# Patient Record
Sex: FEMALE | Race: WHITE | NOT HISPANIC OR LATINO | URBAN - METROPOLITAN AREA
[De-identification: names, ages, dates, MRNs, and addresses within clinical notes are randomized per-mention and may not be internally consistent; named-entity substitution may affect disease eponyms.]

---

## 2017-05-08 ENCOUNTER — EMERGENCY (EMERGENCY)
Facility: HOSPITAL | Age: 57
LOS: 1 days | End: 2017-05-08
Payer: MEDICAID

## 2017-05-08 PROCEDURE — 99284 EMERGENCY DEPT VISIT MOD MDM: CPT

## 2017-05-08 PROCEDURE — 71010: CPT | Mod: 26

## 2017-05-10 ENCOUNTER — OUTPATIENT (OUTPATIENT)
Dept: OUTPATIENT SERVICES | Facility: HOSPITAL | Age: 57
LOS: 1 days | End: 2017-05-10

## 2017-05-10 ENCOUNTER — INPATIENT (INPATIENT)
Facility: HOSPITAL | Age: 57
LOS: 1 days | Discharge: HOME | End: 2017-05-12
Payer: MEDICAID

## 2017-05-10 PROCEDURE — 99285 EMERGENCY DEPT VISIT HI MDM: CPT

## 2017-05-10 PROCEDURE — 71275 CT ANGIOGRAPHY CHEST: CPT | Mod: 26

## 2017-05-10 PROCEDURE — 71010: CPT | Mod: 26

## 2017-05-11 ENCOUNTER — OUTPATIENT (OUTPATIENT)
Dept: OUTPATIENT SERVICES | Facility: HOSPITAL | Age: 57
LOS: 1 days | End: 2017-05-11

## 2017-05-12 ENCOUNTER — OUTPATIENT (OUTPATIENT)
Dept: OUTPATIENT SERVICES | Facility: HOSPITAL | Age: 57
LOS: 1 days | End: 2017-05-12

## 2017-05-12 PROCEDURE — 93306 TTE W/DOPPLER COMPLETE: CPT | Mod: 26

## 2017-06-15 ENCOUNTER — INPATIENT (INPATIENT)
Facility: HOSPITAL | Age: 57
LOS: 3 days | Discharge: HOME | End: 2017-06-19
Attending: INTERNAL MEDICINE | Admitting: INTERNAL MEDICINE
Payer: MEDICAID

## 2017-06-15 VITALS
TEMPERATURE: 99 F | HEIGHT: 64 IN | OXYGEN SATURATION: 97 % | WEIGHT: 199.08 LBS | RESPIRATION RATE: 16 BRPM | SYSTOLIC BLOOD PRESSURE: 116 MMHG | DIASTOLIC BLOOD PRESSURE: 65 MMHG | HEART RATE: 94 BPM

## 2017-06-15 LAB
ADD ON TEST-SPECIMEN IN LAB: SIGNIFICANT CHANGE UP
ALBUMIN SERPL ELPH-MCNC: 2.8 G/DL — LOW (ref 3.3–5)
ALP SERPL-CCNC: 125 U/L — HIGH (ref 40–120)
ALT FLD-CCNC: 14 U/L — SIGNIFICANT CHANGE UP (ref 12–78)
ANION GAP SERPL CALC-SCNC: 8 MMOL/L — SIGNIFICANT CHANGE UP (ref 5–17)
APPEARANCE UR: CLEAR — SIGNIFICANT CHANGE UP
APTT BLD: 31.3 SEC — SIGNIFICANT CHANGE UP (ref 27.5–37.4)
AST SERPL-CCNC: 16 U/L — SIGNIFICANT CHANGE UP (ref 15–37)
BACTERIA # UR AUTO: (no result)
BASOPHILS # BLD AUTO: 0.1 K/UL — SIGNIFICANT CHANGE UP (ref 0–0.2)
BASOPHILS NFR BLD AUTO: 0.8 % — SIGNIFICANT CHANGE UP (ref 0–2)
BILIRUB SERPL-MCNC: 0.5 MG/DL — SIGNIFICANT CHANGE UP (ref 0.2–1.2)
BILIRUB UR-MCNC: (no result)
BUN SERPL-MCNC: 22 MG/DL — SIGNIFICANT CHANGE UP (ref 7–23)
CALCIUM SERPL-MCNC: 8.5 MG/DL — SIGNIFICANT CHANGE UP (ref 8.5–10.1)
CHLORIDE SERPL-SCNC: 106 MMOL/L — SIGNIFICANT CHANGE UP (ref 96–108)
CO2 SERPL-SCNC: 26 MMOL/L — SIGNIFICANT CHANGE UP (ref 22–31)
COLOR SPEC: YELLOW — SIGNIFICANT CHANGE UP
CREAT SERPL-MCNC: 1.41 MG/DL — HIGH (ref 0.5–1.3)
DIFF PNL FLD: (no result)
EOSINOPHIL # BLD AUTO: 0 K/UL — SIGNIFICANT CHANGE UP (ref 0–0.5)
EOSINOPHIL NFR BLD AUTO: 0.4 % — SIGNIFICANT CHANGE UP (ref 0–6)
EPI CELLS # UR: SIGNIFICANT CHANGE UP
GLUCOSE SERPL-MCNC: 136 MG/DL — HIGH (ref 70–99)
GLUCOSE UR QL: NEGATIVE MG/DL — SIGNIFICANT CHANGE UP
HCT VFR BLD CALC: 34.5 % — SIGNIFICANT CHANGE UP (ref 34.5–45)
HGB BLD-MCNC: 11.4 G/DL — LOW (ref 11.5–15.5)
INR BLD: 1.15 RATIO — SIGNIFICANT CHANGE UP (ref 0.88–1.16)
KETONES UR-MCNC: (no result)
LEUKOCYTE ESTERASE UR-ACNC: (no result)
LYMPHOCYTES # BLD AUTO: 1.4 K/UL — SIGNIFICANT CHANGE UP (ref 1–3.3)
LYMPHOCYTES # BLD AUTO: 11 % — LOW (ref 13–44)
MCHC RBC-ENTMCNC: 29 PG — SIGNIFICANT CHANGE UP (ref 27–34)
MCHC RBC-ENTMCNC: 33.1 GM/DL — SIGNIFICANT CHANGE UP (ref 32–36)
MCV RBC AUTO: 87.6 FL — SIGNIFICANT CHANGE UP (ref 80–100)
MONOCYTES # BLD AUTO: 0.8 K/UL — SIGNIFICANT CHANGE UP (ref 0–0.9)
MONOCYTES NFR BLD AUTO: 6.1 % — SIGNIFICANT CHANGE UP (ref 2–14)
NEUTROPHILS # BLD AUTO: 10.1 K/UL — HIGH (ref 1.8–7.4)
NEUTROPHILS NFR BLD AUTO: 81.7 % — HIGH (ref 43–77)
NITRITE UR-MCNC: NEGATIVE — SIGNIFICANT CHANGE UP
NT-PROBNP SERPL-SCNC: 945 PG/ML — HIGH (ref 0–125)
PH UR: 6 — SIGNIFICANT CHANGE UP (ref 5–8)
PLATELET # BLD AUTO: 233 K/UL — SIGNIFICANT CHANGE UP (ref 150–400)
POTASSIUM SERPL-MCNC: 4 MMOL/L — SIGNIFICANT CHANGE UP (ref 3.5–5.3)
POTASSIUM SERPL-SCNC: 4 MMOL/L — SIGNIFICANT CHANGE UP (ref 3.5–5.3)
PROT SERPL-MCNC: 6.6 GM/DL — SIGNIFICANT CHANGE UP (ref 6–8.3)
PROT UR-MCNC: 30 MG/DL
PROTHROM AB SERPL-ACNC: 12.5 SEC — SIGNIFICANT CHANGE UP (ref 9.8–12.7)
RBC # BLD: 3.94 M/UL — SIGNIFICANT CHANGE UP (ref 3.8–5.2)
RBC # FLD: 13.4 % — SIGNIFICANT CHANGE UP (ref 10.3–14.5)
RBC CASTS # UR COMP ASSIST: SIGNIFICANT CHANGE UP /HPF (ref 0–4)
SODIUM SERPL-SCNC: 140 MMOL/L — SIGNIFICANT CHANGE UP (ref 135–145)
SP GR SPEC: 1.02 — SIGNIFICANT CHANGE UP (ref 1.01–1.02)
TROPONIN I SERPL-MCNC: <0.015 NG/ML — SIGNIFICANT CHANGE UP (ref 0.01–0.04)
UROBILINOGEN FLD QL: 8 MG/DL
WBC # BLD: 12.4 K/UL — HIGH (ref 3.8–10.5)
WBC # FLD AUTO: 12.4 K/UL — HIGH (ref 3.8–10.5)
WBC UR QL: SIGNIFICANT CHANGE UP

## 2017-06-15 PROCEDURE — 93010 ELECTROCARDIOGRAM REPORT: CPT

## 2017-06-15 RX ORDER — INSULIN LISPRO 100/ML
5 VIAL (ML) SUBCUTANEOUS
Qty: 0 | Refills: 0 | Status: DISCONTINUED | OUTPATIENT
Start: 2017-06-15 | End: 2017-06-16

## 2017-06-15 RX ORDER — GABAPENTIN 400 MG/1
600 CAPSULE ORAL
Qty: 0 | Refills: 0 | Status: DISCONTINUED | OUTPATIENT
Start: 2017-06-15 | End: 2017-06-19

## 2017-06-15 RX ORDER — INSULIN LISPRO 100/ML
6 VIAL (ML) SUBCUTANEOUS ONCE
Qty: 0 | Refills: 0 | Status: COMPLETED | OUTPATIENT
Start: 2017-06-15 | End: 2017-06-15

## 2017-06-15 RX ORDER — ATORVASTATIN CALCIUM 80 MG/1
40 TABLET, FILM COATED ORAL AT BEDTIME
Qty: 0 | Refills: 0 | Status: DISCONTINUED | OUTPATIENT
Start: 2017-06-15 | End: 2017-06-19

## 2017-06-15 RX ORDER — IPRATROPIUM/ALBUTEROL SULFATE 18-103MCG
3 AEROSOL WITH ADAPTER (GRAM) INHALATION EVERY 6 HOURS
Qty: 0 | Refills: 0 | Status: DISCONTINUED | OUTPATIENT
Start: 2017-06-15 | End: 2017-06-19

## 2017-06-15 RX ORDER — FAMOTIDINE 10 MG/ML
20 INJECTION INTRAVENOUS DAILY
Qty: 0 | Refills: 0 | Status: DISCONTINUED | OUTPATIENT
Start: 2017-06-15 | End: 2017-06-19

## 2017-06-15 RX ORDER — HEPARIN SODIUM 5000 [USP'U]/ML
5000 INJECTION INTRAVENOUS; SUBCUTANEOUS EVERY 8 HOURS
Qty: 0 | Refills: 0 | Status: DISCONTINUED | OUTPATIENT
Start: 2017-06-15 | End: 2017-06-19

## 2017-06-15 RX ORDER — LISINOPRIL 2.5 MG/1
2.5 TABLET ORAL DAILY
Qty: 0 | Refills: 0 | Status: DISCONTINUED | OUTPATIENT
Start: 2017-06-15 | End: 2017-06-19

## 2017-06-15 RX ORDER — ONDANSETRON 8 MG/1
4 TABLET, FILM COATED ORAL EVERY 6 HOURS
Qty: 0 | Refills: 0 | Status: DISCONTINUED | OUTPATIENT
Start: 2017-06-15 | End: 2017-06-19

## 2017-06-15 RX ORDER — INSULIN GLARGINE 100 [IU]/ML
20 INJECTION, SOLUTION SUBCUTANEOUS AT BEDTIME
Qty: 0 | Refills: 0 | Status: DISCONTINUED | OUTPATIENT
Start: 2017-06-15 | End: 2017-06-16

## 2017-06-15 RX ORDER — GLUCAGON INJECTION, SOLUTION 0.5 MG/.1ML
1 INJECTION, SOLUTION SUBCUTANEOUS ONCE
Qty: 0 | Refills: 0 | Status: DISCONTINUED | OUTPATIENT
Start: 2017-06-15 | End: 2017-06-16

## 2017-06-15 RX ORDER — SODIUM CHLORIDE 9 MG/ML
1000 INJECTION, SOLUTION INTRAVENOUS
Qty: 0 | Refills: 0 | Status: DISCONTINUED | OUTPATIENT
Start: 2017-06-15 | End: 2017-06-16

## 2017-06-15 RX ORDER — DEXTROSE 50 % IN WATER 50 %
12.5 SYRINGE (ML) INTRAVENOUS ONCE
Qty: 0 | Refills: 0 | Status: DISCONTINUED | OUTPATIENT
Start: 2017-06-15 | End: 2017-06-16

## 2017-06-15 RX ORDER — DOCUSATE SODIUM 100 MG
100 CAPSULE ORAL THREE TIMES A DAY
Qty: 0 | Refills: 0 | Status: DISCONTINUED | OUTPATIENT
Start: 2017-06-15 | End: 2017-06-19

## 2017-06-15 RX ORDER — QUETIAPINE FUMARATE 200 MG/1
25 TABLET, FILM COATED ORAL
Qty: 0 | Refills: 0 | Status: DISCONTINUED | OUTPATIENT
Start: 2017-06-15 | End: 2017-06-16

## 2017-06-15 RX ORDER — PANTOPRAZOLE SODIUM 20 MG/1
40 TABLET, DELAYED RELEASE ORAL
Qty: 0 | Refills: 0 | Status: DISCONTINUED | OUTPATIENT
Start: 2017-06-15 | End: 2017-06-19

## 2017-06-15 RX ORDER — METOCLOPRAMIDE HCL 10 MG
10 TABLET ORAL EVERY 6 HOURS
Qty: 0 | Refills: 0 | Status: DISCONTINUED | OUTPATIENT
Start: 2017-06-15 | End: 2017-06-15

## 2017-06-15 RX ORDER — INSULIN LISPRO 100/ML
VIAL (ML) SUBCUTANEOUS
Qty: 0 | Refills: 0 | Status: DISCONTINUED | OUTPATIENT
Start: 2017-06-15 | End: 2017-06-16

## 2017-06-15 RX ORDER — DEXTROSE 50 % IN WATER 50 %
1 SYRINGE (ML) INTRAVENOUS ONCE
Qty: 0 | Refills: 0 | Status: DISCONTINUED | OUTPATIENT
Start: 2017-06-15 | End: 2017-06-16

## 2017-06-15 RX ADMIN — Medication 3 MILLILITER(S): at 17:33

## 2017-06-15 RX ADMIN — INSULIN GLARGINE 20 UNIT(S): 100 INJECTION, SOLUTION SUBCUTANEOUS at 22:18

## 2017-06-15 RX ADMIN — Medication 3 MILLILITER(S): at 11:37

## 2017-06-15 RX ADMIN — FAMOTIDINE 20 MILLIGRAM(S): 10 INJECTION INTRAVENOUS at 16:31

## 2017-06-15 RX ADMIN — QUETIAPINE FUMARATE 25 MILLIGRAM(S): 200 TABLET, FILM COATED ORAL at 16:30

## 2017-06-15 RX ADMIN — HEPARIN SODIUM 5000 UNIT(S): 5000 INJECTION INTRAVENOUS; SUBCUTANEOUS at 22:17

## 2017-06-15 RX ADMIN — Medication 40 MILLIGRAM(S): at 22:18

## 2017-06-15 RX ADMIN — LISINOPRIL 2.5 MILLIGRAM(S): 2.5 TABLET ORAL at 16:30

## 2017-06-15 RX ADMIN — Medication 125 MILLIGRAM(S): at 11:37

## 2017-06-15 RX ADMIN — GABAPENTIN 600 MILLIGRAM(S): 400 CAPSULE ORAL at 16:31

## 2017-06-15 RX ADMIN — Medication 5 UNIT(S): at 16:30

## 2017-06-15 RX ADMIN — Medication 6 UNIT(S): at 22:38

## 2017-06-15 RX ADMIN — Medication 3 MILLILITER(S): at 21:35

## 2017-06-15 RX ADMIN — Medication 100 MILLIGRAM(S): at 22:17

## 2017-06-15 RX ADMIN — ATORVASTATIN CALCIUM 40 MILLIGRAM(S): 80 TABLET, FILM COATED ORAL at 22:16

## 2017-06-15 RX ADMIN — Medication: at 16:21

## 2017-06-15 RX ADMIN — Medication 3 MILLILITER(S): at 13:42

## 2017-06-15 NOTE — H&P ADULT - HISTORY OF PRESENT ILLNESS
Patient is 56yo female with PMHx of DM, CAD/CABG, HTN, Peripheral neuropathy, COPD/emphysema, Lung Ca diagnosed 2 months ago, not on active treatment, active smoker 1/2ppd, presents with SOB. Pt notes SOB started 2 days ago, associated with fever, chills, cough productive of brown sputum, and mild intermittent chest pain associated with coughing. Pt reports she heard herself wheezing, with improvement in SOB after duonebs. No other constitutional symptoms. Patient is an active smoker, cutting down from 3 packs to 1/2pk per day. Denies LE edema, recent travel.

## 2017-06-15 NOTE — PATIENT PROFILE ADULT. - NS TRANSFER PATIENT BELONGINGS
Wrist Watch/Tablet, $63 (patient will keep), big bag of belongings/Jewelry/Money (specify)/Clothing/Cell Phone/PDA (specify)/Other belongings

## 2017-06-15 NOTE — CHART NOTE - NSCHARTNOTEFT_GEN_A_CORE
Called by RN- patient experiencing shortness of breath    Patient seen and examined at bedside. Sitting up and appears dyspneic but no accessory muscle use or intercostal retractions noted.    vitals: temp: 97.2  HR: 87/mi   Sat : 95% on 3l   Temp: 97.2    CVS: S1 S2 normal and regular  CHEST: extensive wheezing in all lung segments  ABDOMEN: soft nontender normal bowel sounds      A&P  56yo female with PMHx of DM, CAD/CABG, HTN, Peripheral neuropathy, COPD/emphysema, Lung Ca diagnosed 2 months ago,now with acute SOB    - switch nasal cannula to venturi mask  - stat chest x ray  - stat Duoneb treatment  - pt on solumedrol 125 mg q 8h  - if patient still SOB - may need to monitor on continuous pulse oximeter

## 2017-06-15 NOTE — ED PROVIDER NOTE - OBJECTIVE STATEMENT
58 y/o F with hx of COPD and lung CA presents to the ED c/o worsening SOB x 2 days. Pt states SOB started yesterday and is worse today. +Cough with occasional sputum. No recent travel. No known sick contacts. Pt is a current every day smoker, no drinking. Currently pt has no other complaints and denies n/v/d, cp, ha, and fever. 58 y/o F with hx of COPD and lung CA presents to the ED c/o worsening SOB x 2 days. Pt states SOB started yesterday and is worse today. +Cough with occasional sputum. No recent travel. No known sick contacts. Pt is a current every day smoker, no drinking. Currently pt has no other complaints and denies n/v/d, cp, ha, and fever. PMD= Ottoniel.

## 2017-06-15 NOTE — H&P ADULT - NSHPLABSRESULTS_GEN_ALL_CORE
CARDIAC MARKERS ( 15 Darian 2017 12:20 )  <0.015 ng/mL / x     / x     / x     / x                                11.4   12.4  )-----------( 233      ( 15 Darian 2017 12:20 )             34.5     15 Darian 2017 12:20    140    |  106    |  22     ----------------------------<  136    4.0     |  26     |  1.41     Ca    8.5        15 Darian 2017 12:20    TPro  6.6    /  Alb  2.8    /  TBili  0.5    /  DBili  x      /  AST  16     /  ALT  14     /  AlkPhos  125    15 Darian 2017 12:20    PT/INR - ( 15 Darian 2017 12:20 )   PT: 12.5 sec;   INR: 1.15 ratio         PTT - ( 15 Darian 2017 12:20 )  PTT:31.3 sec  CAPILLARY BLOOD GLUCOSE  241 (15 Darian 2017 16:06)    LIVER FUNCTIONS - ( 15 Darian 2017 12:20 )  Alb: 2.8 g/dL / Pro: 6.6 gm/dL / ALK PHOS: 125 U/L / ALT: 14 U/L / AST: 16 U/L / GGT: x           Urinalysis Basic - ( 15 Darian 2017 15:11 )    Color: Yellow / Appearance: Clear / S.020 / pH: x  Gluc: x / Ketone: Trace  / Bili: Small / Urobili: 8 mg/dL   Blood: x / Protein: 30 mg/dL / Nitrite: Negative   Leuk Esterase: Moderate / RBC: x / WBC x   Sq Epi: x / Non Sq Epi: x / Bacteria: x

## 2017-06-15 NOTE — H&P ADULT - ASSESSMENT
58yo female a/w SOB    # SOB/COPD exacerbation  - afebrile HD stable, comfortable on 3LNC, sat 94%, on exam has end exp wheezing  - reports duonebs alleviate SOB  - Solumderol IV  - Levaquin  - Duonebs  - CXR without focal consolidation  - IS    # DM  - Lispro sliding scale  - Lantus 20u qhs  - Premeal Lispro    # Diabetic Neuropathy  - Gabapentin    # HTN  - Lisinopril    # CAD/CABG  - Lisinopril  - Pt not on ASA/Plavix?  - cont with lipitor    # DVT ppx, HSQ    # Admit, stable

## 2017-06-15 NOTE — H&P ADULT - NSHPREVIEWOFSYSTEMS_GEN_ALL_CORE
(-) headache, dizziness, palpitations, abd pain, n/v/d, leg swelling  (+)Fever, chills, cough, chest pain, sob,

## 2017-06-16 LAB
ANION GAP SERPL CALC-SCNC: 10 MMOL/L — SIGNIFICANT CHANGE UP (ref 5–17)
BUN SERPL-MCNC: 38 MG/DL — HIGH (ref 7–23)
CALCIUM SERPL-MCNC: 8.8 MG/DL — SIGNIFICANT CHANGE UP (ref 8.5–10.1)
CHLORIDE SERPL-SCNC: 102 MMOL/L — SIGNIFICANT CHANGE UP (ref 96–108)
CO2 SERPL-SCNC: 26 MMOL/L — SIGNIFICANT CHANGE UP (ref 22–31)
CREAT SERPL-MCNC: 1.42 MG/DL — HIGH (ref 0.5–1.3)
GLUCOSE SERPL-MCNC: 345 MG/DL — HIGH (ref 70–99)
HBA1C BLD-MCNC: 9.6 % — HIGH (ref 4–5.6)
HCT VFR BLD CALC: 31.6 % — LOW (ref 34.5–45)
HGB BLD-MCNC: 10.5 G/DL — LOW (ref 11.5–15.5)
MCHC RBC-ENTMCNC: 29.4 PG — SIGNIFICANT CHANGE UP (ref 27–34)
MCHC RBC-ENTMCNC: 33.4 GM/DL — SIGNIFICANT CHANGE UP (ref 32–36)
MCV RBC AUTO: 88 FL — SIGNIFICANT CHANGE UP (ref 80–100)
PLATELET # BLD AUTO: 256 K/UL — SIGNIFICANT CHANGE UP (ref 150–400)
POTASSIUM SERPL-MCNC: 4.9 MMOL/L — SIGNIFICANT CHANGE UP (ref 3.5–5.3)
POTASSIUM SERPL-SCNC: 4.9 MMOL/L — SIGNIFICANT CHANGE UP (ref 3.5–5.3)
RBC # BLD: 3.59 M/UL — LOW (ref 3.8–5.2)
RBC # FLD: 13.3 % — SIGNIFICANT CHANGE UP (ref 10.3–14.5)
SODIUM SERPL-SCNC: 138 MMOL/L — SIGNIFICANT CHANGE UP (ref 135–145)
WBC # BLD: 12.8 K/UL — HIGH (ref 3.8–10.5)
WBC # FLD AUTO: 12.8 K/UL — HIGH (ref 3.8–10.5)

## 2017-06-16 RX ORDER — INSULIN LISPRO 100/ML
5 VIAL (ML) SUBCUTANEOUS
Qty: 0 | Refills: 0 | Status: DISCONTINUED | OUTPATIENT
Start: 2017-06-16 | End: 2017-06-16

## 2017-06-16 RX ORDER — SODIUM CHLORIDE 9 MG/ML
1000 INJECTION, SOLUTION INTRAVENOUS
Qty: 0 | Refills: 0 | Status: DISCONTINUED | OUTPATIENT
Start: 2017-06-16 | End: 2017-06-16

## 2017-06-16 RX ORDER — INSULIN GLARGINE 100 [IU]/ML
20 INJECTION, SOLUTION SUBCUTANEOUS EVERY MORNING
Qty: 0 | Refills: 0 | Status: DISCONTINUED | OUTPATIENT
Start: 2017-06-16 | End: 2017-06-16

## 2017-06-16 RX ORDER — OXYBUTYNIN CHLORIDE 5 MG
10 TABLET ORAL DAILY
Qty: 0 | Refills: 0 | Status: DISCONTINUED | OUTPATIENT
Start: 2017-06-16 | End: 2017-06-16

## 2017-06-16 RX ORDER — DEXTROSE 50 % IN WATER 50 %
12.5 SYRINGE (ML) INTRAVENOUS ONCE
Qty: 0 | Refills: 0 | Status: DISCONTINUED | OUTPATIENT
Start: 2017-06-16 | End: 2017-06-16

## 2017-06-16 RX ORDER — GLUCAGON INJECTION, SOLUTION 0.5 MG/.1ML
1 INJECTION, SOLUTION SUBCUTANEOUS ONCE
Qty: 0 | Refills: 0 | Status: DISCONTINUED | OUTPATIENT
Start: 2017-06-16 | End: 2017-06-16

## 2017-06-16 RX ORDER — INSULIN LISPRO 100/ML
10 VIAL (ML) SUBCUTANEOUS ONCE
Qty: 0 | Refills: 0 | Status: COMPLETED | OUTPATIENT
Start: 2017-06-16 | End: 2017-06-16

## 2017-06-16 RX ORDER — OXYBUTYNIN CHLORIDE 5 MG
5 TABLET ORAL
Qty: 0 | Refills: 0 | Status: DISCONTINUED | OUTPATIENT
Start: 2017-06-16 | End: 2017-06-19

## 2017-06-16 RX ORDER — DEXTROSE 50 % IN WATER 50 %
1 SYRINGE (ML) INTRAVENOUS ONCE
Qty: 0 | Refills: 0 | Status: DISCONTINUED | OUTPATIENT
Start: 2017-06-16 | End: 2017-06-16

## 2017-06-16 RX ORDER — INSULIN GLARGINE 100 [IU]/ML
10 INJECTION, SOLUTION SUBCUTANEOUS AT BEDTIME
Qty: 0 | Refills: 0 | Status: DISCONTINUED | OUTPATIENT
Start: 2017-06-16 | End: 2017-06-16

## 2017-06-16 RX ORDER — DEXTROSE 50 % IN WATER 50 %
12.5 SYRINGE (ML) INTRAVENOUS ONCE
Qty: 0 | Refills: 0 | Status: DISCONTINUED | OUTPATIENT
Start: 2017-06-16 | End: 2017-06-19

## 2017-06-16 RX ORDER — INSULIN LISPRO 100/ML
6 VIAL (ML) SUBCUTANEOUS ONCE
Qty: 0 | Refills: 0 | Status: COMPLETED | OUTPATIENT
Start: 2017-06-16 | End: 2017-06-16

## 2017-06-16 RX ORDER — METOCLOPRAMIDE HCL 10 MG
10 TABLET ORAL EVERY 6 HOURS
Qty: 0 | Refills: 0 | Status: DISCONTINUED | OUTPATIENT
Start: 2017-06-16 | End: 2017-06-19

## 2017-06-16 RX ORDER — DEXTROSE 50 % IN WATER 50 %
25 SYRINGE (ML) INTRAVENOUS ONCE
Qty: 0 | Refills: 0 | Status: DISCONTINUED | OUTPATIENT
Start: 2017-06-16 | End: 2017-06-19

## 2017-06-16 RX ORDER — INSULIN GLARGINE 100 [IU]/ML
10 INJECTION, SOLUTION SUBCUTANEOUS ONCE
Qty: 0 | Refills: 0 | Status: COMPLETED | OUTPATIENT
Start: 2017-06-16 | End: 2017-06-16

## 2017-06-16 RX ORDER — QUETIAPINE FUMARATE 200 MG/1
50 TABLET, FILM COATED ORAL AT BEDTIME
Qty: 0 | Refills: 0 | Status: DISCONTINUED | OUTPATIENT
Start: 2017-06-16 | End: 2017-06-19

## 2017-06-16 RX ORDER — INSULIN GLARGINE 100 [IU]/ML
80 INJECTION, SOLUTION SUBCUTANEOUS EVERY MORNING
Qty: 0 | Refills: 0 | Status: DISCONTINUED | OUTPATIENT
Start: 2017-06-16 | End: 2017-06-19

## 2017-06-16 RX ORDER — DEXTROSE 50 % IN WATER 50 %
25 SYRINGE (ML) INTRAVENOUS ONCE
Qty: 0 | Refills: 0 | Status: DISCONTINUED | OUTPATIENT
Start: 2017-06-16 | End: 2017-06-16

## 2017-06-16 RX ORDER — INSULIN GLARGINE 100 [IU]/ML
80 INJECTION, SOLUTION SUBCUTANEOUS AT BEDTIME
Qty: 0 | Refills: 0 | Status: DISCONTINUED | OUTPATIENT
Start: 2017-06-16 | End: 2017-06-19

## 2017-06-16 RX ORDER — SODIUM CHLORIDE 9 MG/ML
1000 INJECTION, SOLUTION INTRAVENOUS
Qty: 0 | Refills: 0 | Status: DISCONTINUED | OUTPATIENT
Start: 2017-06-16 | End: 2017-06-19

## 2017-06-16 RX ORDER — INSULIN LISPRO 100/ML
VIAL (ML) SUBCUTANEOUS
Qty: 0 | Refills: 0 | Status: DISCONTINUED | OUTPATIENT
Start: 2017-06-16 | End: 2017-06-19

## 2017-06-16 RX ORDER — INSULIN GLARGINE 100 [IU]/ML
50 INJECTION, SOLUTION SUBCUTANEOUS ONCE
Qty: 0 | Refills: 0 | Status: COMPLETED | OUTPATIENT
Start: 2017-06-16 | End: 2017-06-16

## 2017-06-16 RX ORDER — DEXTROSE 50 % IN WATER 50 %
1 SYRINGE (ML) INTRAVENOUS ONCE
Qty: 0 | Refills: 0 | Status: DISCONTINUED | OUTPATIENT
Start: 2017-06-16 | End: 2017-06-19

## 2017-06-16 RX ORDER — GLUCAGON INJECTION, SOLUTION 0.5 MG/.1ML
1 INJECTION, SOLUTION SUBCUTANEOUS ONCE
Qty: 0 | Refills: 0 | Status: DISCONTINUED | OUTPATIENT
Start: 2017-06-16 | End: 2017-06-19

## 2017-06-16 RX ADMIN — QUETIAPINE FUMARATE 25 MILLIGRAM(S): 200 TABLET, FILM COATED ORAL at 06:02

## 2017-06-16 RX ADMIN — PANTOPRAZOLE SODIUM 40 MILLIGRAM(S): 20 TABLET, DELAYED RELEASE ORAL at 10:18

## 2017-06-16 RX ADMIN — Medication 10 UNIT(S): at 08:50

## 2017-06-16 RX ADMIN — GABAPENTIN 600 MILLIGRAM(S): 400 CAPSULE ORAL at 11:33

## 2017-06-16 RX ADMIN — Medication 100 MILLIGRAM(S): at 05:55

## 2017-06-16 RX ADMIN — Medication 3 MILLILITER(S): at 19:28

## 2017-06-16 RX ADMIN — GABAPENTIN 600 MILLIGRAM(S): 400 CAPSULE ORAL at 17:03

## 2017-06-16 RX ADMIN — ATORVASTATIN CALCIUM 40 MILLIGRAM(S): 80 TABLET, FILM COATED ORAL at 21:37

## 2017-06-16 RX ADMIN — Medication 10 UNIT(S): at 10:18

## 2017-06-16 RX ADMIN — QUETIAPINE FUMARATE 50 MILLIGRAM(S): 200 TABLET, FILM COATED ORAL at 21:38

## 2017-06-16 RX ADMIN — INSULIN GLARGINE 50 UNIT(S): 100 INJECTION, SOLUTION SUBCUTANEOUS at 12:38

## 2017-06-16 RX ADMIN — Medication 40 MILLIGRAM(S): at 05:55

## 2017-06-16 RX ADMIN — Medication 6 UNIT(S): at 00:13

## 2017-06-16 RX ADMIN — Medication 10 MILLIGRAM(S): at 17:03

## 2017-06-16 RX ADMIN — Medication 10 MILLIGRAM(S): at 11:34

## 2017-06-16 RX ADMIN — Medication 3 MILLILITER(S): at 11:28

## 2017-06-16 RX ADMIN — Medication 12: at 12:18

## 2017-06-16 RX ADMIN — FAMOTIDINE 20 MILLIGRAM(S): 10 INJECTION INTRAVENOUS at 11:33

## 2017-06-16 RX ADMIN — Medication 3 MILLILITER(S): at 08:24

## 2017-06-16 RX ADMIN — Medication 3 MILLILITER(S): at 15:34

## 2017-06-16 RX ADMIN — Medication 40 MILLIGRAM(S): at 15:30

## 2017-06-16 RX ADMIN — Medication 10: at 17:02

## 2017-06-16 RX ADMIN — INSULIN GLARGINE 20 UNIT(S): 100 INJECTION, SOLUTION SUBCUTANEOUS at 08:50

## 2017-06-16 RX ADMIN — GABAPENTIN 600 MILLIGRAM(S): 400 CAPSULE ORAL at 00:13

## 2017-06-16 RX ADMIN — INSULIN GLARGINE 80 UNIT(S): 100 INJECTION, SOLUTION SUBCUTANEOUS at 21:38

## 2017-06-16 RX ADMIN — HEPARIN SODIUM 5000 UNIT(S): 5000 INJECTION INTRAVENOUS; SUBCUTANEOUS at 05:55

## 2017-06-16 RX ADMIN — Medication 5 MILLIGRAM(S): at 11:34

## 2017-06-16 RX ADMIN — HEPARIN SODIUM 5000 UNIT(S): 5000 INJECTION INTRAVENOUS; SUBCUTANEOUS at 13:22

## 2017-06-16 RX ADMIN — HEPARIN SODIUM 5000 UNIT(S): 5000 INJECTION INTRAVENOUS; SUBCUTANEOUS at 21:37

## 2017-06-16 RX ADMIN — INSULIN GLARGINE 10 UNIT(S): 100 INJECTION, SOLUTION SUBCUTANEOUS at 03:12

## 2017-06-16 RX ADMIN — Medication 40 MILLIGRAM(S): at 21:38

## 2017-06-16 RX ADMIN — Medication 5 MILLIGRAM(S): at 17:03

## 2017-06-16 RX ADMIN — LISINOPRIL 2.5 MILLIGRAM(S): 2.5 TABLET ORAL at 06:02

## 2017-06-16 RX ADMIN — GABAPENTIN 600 MILLIGRAM(S): 400 CAPSULE ORAL at 05:55

## 2017-06-16 NOTE — PROVIDER CONTACT NOTE (OTHER) - BACKGROUND
admitted for SOB and COPD exacerbation  Hx DM-- lantus 80units Subq admitted for SOB and COPD exacerbation  Hx DM-- lantus 80units Subq    as per patient she takes 16units Humalog QID plus sliding scale  and 80units of lantus BID

## 2017-06-16 NOTE — CHART NOTE - NSCHARTNOTEFT_GEN_A_CORE
Suggest change diet to   Consistent Carbohydrate w/Snack, DASH    Suggest check HgbA1c, BG elevated this AM > 400    Pt reports choking when swallowing, says she is waiting to have "airway stretched".  Suggest SLP consult    thank you,  SS

## 2017-06-16 NOTE — DIETITIAN INITIAL EVALUATION ADULT. - OTHER INFO
Consult for chew/swallow.  Pt reports she needs esophagus stretched, though her PO intake is reported as  very good, she has trouble swallowing at times, she reports that food gets stuck.

## 2017-06-16 NOTE — DIETITIAN INITIAL EVALUATION ADULT. - ENERGY NEEDS
Ht.   64   "  Wt.      60  kg        199 lbs         BMI 34.2              IBW 60      kg            Pt is at 151   %  IBW      ABW 67 kg

## 2017-06-16 NOTE — PROVIDER CONTACT NOTE (OTHER) - ACTION/TREATMENT ORDERED:
Give 80 units as ordered recheck BGM in 2 hours Give 80 units as ordered recheck BGM in 2 hours  Endocrinology consult

## 2017-06-16 NOTE — DIETITIAN INITIAL EVALUATION ADULT. - NS AS NUTRI INTERV MEALS SNACK
Fat - modified diet/Texture-modified diet/DASH, CC,    texture as per rd/Mineral - modified diet/Carbohydrate - modified diet

## 2017-06-17 LAB
ANION GAP SERPL CALC-SCNC: 8 MMOL/L — SIGNIFICANT CHANGE UP (ref 5–17)
BUN SERPL-MCNC: 51 MG/DL — HIGH (ref 7–23)
CALCIUM SERPL-MCNC: 8.9 MG/DL — SIGNIFICANT CHANGE UP (ref 8.5–10.1)
CHLORIDE SERPL-SCNC: 100 MMOL/L — SIGNIFICANT CHANGE UP (ref 96–108)
CO2 SERPL-SCNC: 27 MMOL/L — SIGNIFICANT CHANGE UP (ref 22–31)
CREAT SERPL-MCNC: 1.36 MG/DL — HIGH (ref 0.5–1.3)
GLUCOSE SERPL-MCNC: 406 MG/DL — HIGH (ref 70–99)
GLUCOSE SERPL-MCNC: 527 MG/DL — CRITICAL HIGH (ref 70–99)
HCT VFR BLD CALC: 32 % — LOW (ref 34.5–45)
HGB BLD-MCNC: 10.6 G/DL — LOW (ref 11.5–15.5)
MCHC RBC-ENTMCNC: 29.6 PG — SIGNIFICANT CHANGE UP (ref 27–34)
MCHC RBC-ENTMCNC: 33 GM/DL — SIGNIFICANT CHANGE UP (ref 32–36)
MCV RBC AUTO: 89.6 FL — SIGNIFICANT CHANGE UP (ref 80–100)
PLATELET # BLD AUTO: 289 K/UL — SIGNIFICANT CHANGE UP (ref 150–400)
POTASSIUM SERPL-MCNC: 5.4 MMOL/L — HIGH (ref 3.5–5.3)
POTASSIUM SERPL-SCNC: 5.4 MMOL/L — HIGH (ref 3.5–5.3)
RBC # BLD: 3.57 M/UL — LOW (ref 3.8–5.2)
RBC # FLD: 13.6 % — SIGNIFICANT CHANGE UP (ref 10.3–14.5)
SODIUM SERPL-SCNC: 135 MMOL/L — SIGNIFICANT CHANGE UP (ref 135–145)
WBC # BLD: 19.4 K/UL — HIGH (ref 3.8–10.5)
WBC # FLD AUTO: 19.4 K/UL — HIGH (ref 3.8–10.5)

## 2017-06-17 RX ORDER — SODIUM CHLORIDE 9 MG/ML
500 INJECTION INTRAMUSCULAR; INTRAVENOUS; SUBCUTANEOUS
Qty: 0 | Refills: 0 | Status: DISCONTINUED | OUTPATIENT
Start: 2017-06-17 | End: 2017-06-17

## 2017-06-17 RX ORDER — SODIUM CHLORIDE 9 MG/ML
500 INJECTION INTRAMUSCULAR; INTRAVENOUS; SUBCUTANEOUS
Qty: 0 | Refills: 0 | Status: COMPLETED | OUTPATIENT
Start: 2017-06-17 | End: 2017-06-17

## 2017-06-17 RX ORDER — INSULIN LISPRO 100/ML
12 VIAL (ML) SUBCUTANEOUS ONCE
Qty: 0 | Refills: 0 | Status: COMPLETED | OUTPATIENT
Start: 2017-06-17 | End: 2017-06-17

## 2017-06-17 RX ORDER — LANOLIN ALCOHOL/MO/W.PET/CERES
3 CREAM (GRAM) TOPICAL AT BEDTIME
Qty: 0 | Refills: 0 | Status: DISCONTINUED | OUTPATIENT
Start: 2017-06-17 | End: 2017-06-19

## 2017-06-17 RX ORDER — INSULIN LISPRO 100/ML
10 VIAL (ML) SUBCUTANEOUS ONCE
Qty: 0 | Refills: 0 | Status: COMPLETED | OUTPATIENT
Start: 2017-06-17 | End: 2017-06-17

## 2017-06-17 RX ORDER — SODIUM CHLORIDE 9 MG/ML
1000 INJECTION INTRAMUSCULAR; INTRAVENOUS; SUBCUTANEOUS
Qty: 0 | Refills: 0 | Status: DISCONTINUED | OUTPATIENT
Start: 2017-06-17 | End: 2017-06-19

## 2017-06-17 RX ADMIN — SODIUM CHLORIDE 75 MILLILITER(S): 9 INJECTION INTRAMUSCULAR; INTRAVENOUS; SUBCUTANEOUS at 04:23

## 2017-06-17 RX ADMIN — Medication 10 MILLIGRAM(S): at 00:00

## 2017-06-17 RX ADMIN — Medication 5 MILLIGRAM(S): at 00:00

## 2017-06-17 RX ADMIN — Medication 5 MILLIGRAM(S): at 17:02

## 2017-06-17 RX ADMIN — Medication 3 MILLILITER(S): at 02:24

## 2017-06-17 RX ADMIN — Medication 200 MILLIGRAM(S): at 13:24

## 2017-06-17 RX ADMIN — Medication 10 UNIT(S): at 10:30

## 2017-06-17 RX ADMIN — Medication: at 11:30

## 2017-06-17 RX ADMIN — Medication 12 UNIT(S): at 01:29

## 2017-06-17 RX ADMIN — Medication 3 MILLILITER(S): at 06:09

## 2017-06-17 RX ADMIN — Medication 100 MILLIGRAM(S): at 21:50

## 2017-06-17 RX ADMIN — HEPARIN SODIUM 5000 UNIT(S): 5000 INJECTION INTRAVENOUS; SUBCUTANEOUS at 13:22

## 2017-06-17 RX ADMIN — QUETIAPINE FUMARATE 50 MILLIGRAM(S): 200 TABLET, FILM COATED ORAL at 21:50

## 2017-06-17 RX ADMIN — Medication 10 MILLIGRAM(S): at 21:51

## 2017-06-17 RX ADMIN — Medication 10 MILLIGRAM(S): at 12:12

## 2017-06-17 RX ADMIN — Medication 40 MILLIGRAM(S): at 05:45

## 2017-06-17 RX ADMIN — Medication 5 MILLIGRAM(S): at 05:46

## 2017-06-17 RX ADMIN — Medication 200 MILLIGRAM(S): at 03:39

## 2017-06-17 RX ADMIN — Medication 6: at 17:02

## 2017-06-17 RX ADMIN — Medication 3 MILLILITER(S): at 00:14

## 2017-06-17 RX ADMIN — Medication 12: at 08:31

## 2017-06-17 RX ADMIN — GABAPENTIN 600 MILLIGRAM(S): 400 CAPSULE ORAL at 00:00

## 2017-06-17 RX ADMIN — Medication 5 MILLIGRAM(S): at 21:51

## 2017-06-17 RX ADMIN — Medication 3 MILLILITER(S): at 12:02

## 2017-06-17 RX ADMIN — HEPARIN SODIUM 5000 UNIT(S): 5000 INJECTION INTRAVENOUS; SUBCUTANEOUS at 21:49

## 2017-06-17 RX ADMIN — INSULIN GLARGINE 80 UNIT(S): 100 INJECTION, SOLUTION SUBCUTANEOUS at 21:49

## 2017-06-17 RX ADMIN — ATORVASTATIN CALCIUM 40 MILLIGRAM(S): 80 TABLET, FILM COATED ORAL at 21:49

## 2017-06-17 RX ADMIN — INSULIN GLARGINE 80 UNIT(S): 100 INJECTION, SOLUTION SUBCUTANEOUS at 08:32

## 2017-06-17 RX ADMIN — Medication 200 MILLIGRAM(S): at 22:00

## 2017-06-17 RX ADMIN — GABAPENTIN 600 MILLIGRAM(S): 400 CAPSULE ORAL at 17:03

## 2017-06-17 RX ADMIN — Medication 5 MILLIGRAM(S): at 12:12

## 2017-06-17 RX ADMIN — Medication 200 MILLIGRAM(S): at 18:42

## 2017-06-17 RX ADMIN — LISINOPRIL 2.5 MILLIGRAM(S): 2.5 TABLET ORAL at 05:46

## 2017-06-17 RX ADMIN — Medication 10 MILLIGRAM(S): at 17:02

## 2017-06-17 RX ADMIN — Medication 3 MILLILITER(S): at 20:22

## 2017-06-17 RX ADMIN — GABAPENTIN 600 MILLIGRAM(S): 400 CAPSULE ORAL at 21:50

## 2017-06-17 RX ADMIN — HEPARIN SODIUM 5000 UNIT(S): 5000 INJECTION INTRAVENOUS; SUBCUTANEOUS at 05:45

## 2017-06-17 RX ADMIN — PANTOPRAZOLE SODIUM 40 MILLIGRAM(S): 20 TABLET, DELAYED RELEASE ORAL at 05:46

## 2017-06-17 RX ADMIN — Medication 100 MILLIGRAM(S): at 13:22

## 2017-06-17 RX ADMIN — Medication 20 MILLIGRAM(S): at 17:02

## 2017-06-17 RX ADMIN — GABAPENTIN 600 MILLIGRAM(S): 400 CAPSULE ORAL at 12:12

## 2017-06-17 RX ADMIN — FAMOTIDINE 20 MILLIGRAM(S): 10 INJECTION INTRAVENOUS at 12:12

## 2017-06-17 RX ADMIN — SODIUM CHLORIDE 50 MILLILITER(S): 9 INJECTION INTRAMUSCULAR; INTRAVENOUS; SUBCUTANEOUS at 09:54

## 2017-06-17 RX ADMIN — Medication 3 MILLIGRAM(S): at 21:58

## 2017-06-17 RX ADMIN — Medication 10 MILLIGRAM(S): at 05:46

## 2017-06-17 RX ADMIN — GABAPENTIN 600 MILLIGRAM(S): 400 CAPSULE ORAL at 05:46

## 2017-06-17 NOTE — PROVIDER CONTACT NOTE (OTHER) - ACTION/TREATMENT ORDERED:
orders received for NS @100 for total of 500cc recheck BGM after fluids. no orders for humalog at this time.

## 2017-06-17 NOTE — PROVIDER CONTACT NOTE (OTHER) - ASSESSMENT
s/p 12units of Humalog one hour ago
No complaints as per patient
no complaints
No complaints as per patient

## 2017-06-17 NOTE — CONSULT NOTE ADULT - SUBJECTIVE AND OBJECTIVE BOX
58 yo female with h/o DM2, CAD, HTN, DLD, lung CA (not on treatment), COPD a/w worsening SOB, placed on high dose GC's with high BS"s asked to aid in management.      Dx with DM2 22 years ago.  +neuropathy and nephropathy.  No known retinopathy but has not seen optho in awhile.  Has F/U with PCP for DM management.  At home takes lantus 80 units BID and humalog 16 units TIDAC.  Does not feel she consumes a lot of CHO's in general.  This admission BS's were very high and lantus was increased to home dose which has improved BS's.  Still with SOB but improved.  Denies abdominal pain, nausea, chest pain, HA or dizziness.  Tolerating PO.      PAST MEDICAL & SURGICAL HISTORY: per HPI  FAMILY HISTORY: + DM2--paternal GM  SOCIAL HISTORY: +tobacco   MEDICATIONS  (STANDING):  ALBUTerol/ipratropium for Nebulization 3milliLiter(s) Nebulizer every 6 hours  famotidine    Tablet 20milliGRAM(s) Oral daily  pantoprazole    Tablet 40milliGRAM(s) Oral before breakfast  atorvastatin 40milliGRAM(s) Oral at bedtime  gabapentin 600milliGRAM(s) Oral four times a day  lisinopril 2.5milliGRAM(s) Oral daily  levoFLOXacin  Tablet 500milliGRAM(s) Oral every 24 hours  heparin  Injectable 5000Unit(s) SubCutaneous every 8 hours  docusate sodium 100milliGRAM(s) Oral three times a day  metoclopramide 10milliGRAM(s) Oral every 6 hours  QUEtiapine 50milliGRAM(s) Oral at bedtime  oxybutynin 5milliGRAM(s) Oral four times a day  insulin glargine Injectable (LANTUS) 80Unit(s) SubCutaneous every morning  insulin glargine Injectable (LANTUS) 80Unit(s) SubCutaneous at bedtime  insulin lispro (HumaLOG) corrective regimen sliding scale  SubCutaneous three times a day before meals  dextrose 5%. 1000milliLiter(s) IV Continuous <Continuous>  dextrose 50% Injectable 12.5Gram(s) IV Push once  dextrose 50% Injectable 25Gram(s) IV Push once  dextrose 50% Injectable 25Gram(s) IV Push once  methylPREDNISolone sodium succinate Injectable 20milliGRAM(s) IV Push every 12 hours  sodium chloride 0.9%. 1000milliLiter(s) IV Continuous <Continuous>  Allergies: Dilaudid (Unknown)  ROS: per HPI, others negative     PE:  Vital Signs Last 24 Hrs  T(C): 36.4, Max: 36.5 (06-16 @ 20:19)  T(F): 97.5, Max: 97.7 (06-16 @ 20:19)  HR: 92 (88 - 97)  BP: 114/58 (114/58 - 141/60)  BP(mean): --  RR: 18 (18 - 18)  SpO2: 94% (94% - 95%)  CAPILLARY BLOOD GLUCOSE  263 (17 Jun 2017 16:04)  465 (17 Jun 2017 10:51)  455 (17 Jun 2017 09:00)  401 (17 Jun 2017 08:11)  425 (17 Jun 2017 05:56)  468 (17 Jun 2017 03:42)  456 (17 Jun 2017 02:48)  530 (16 Jun 2017 23:57)  496 (16 Jun 2017 23:56)  401 (16 Jun 2017 21:24)  NAD. AAOx3. pleasant. No TM.  S1+S2. +wheeze.  Soft. NT. NO LE edema B/L 58 yo female with h/o DM2, CAD, HTN, DLD, lung CA (not on treatment), COPD a/w worsening SOB, placed on high dose GC's with high BS's asked to aid in management.      Dx with DM2 22 years ago.  +neuropathy and nephropathy.  No known retinopathy but has not seen optho in awhile.  Has F/U with PCP for DM management.  At home takes lantus 80 units BID and humalog 16 units TIDAC.  Does not feel she consumes a lot of CHO's in general.  This admission BS's were very high and lantus was increased to home dose which has improved BS's.  Still with SOB but improved.  Denies abdominal pain, nausea, chest pain, HA or dizziness.  Tolerating PO.      PAST MEDICAL & SURGICAL HISTORY: per HPI  FAMILY HISTORY: + DM2--paternal GM  SOCIAL HISTORY: +tobacco   MEDICATIONS  (STANDING):  ALBUTerol/ipratropium for Nebulization 3milliLiter(s) Nebulizer every 6 hours  famotidine    Tablet 20milliGRAM(s) Oral daily  pantoprazole    Tablet 40milliGRAM(s) Oral before breakfast  atorvastatin 40milliGRAM(s) Oral at bedtime  gabapentin 600milliGRAM(s) Oral four times a day  lisinopril 2.5milliGRAM(s) Oral daily  levoFLOXacin  Tablet 500milliGRAM(s) Oral every 24 hours  heparin  Injectable 5000Unit(s) SubCutaneous every 8 hours  docusate sodium 100milliGRAM(s) Oral three times a day  metoclopramide 10milliGRAM(s) Oral every 6 hours  QUEtiapine 50milliGRAM(s) Oral at bedtime  oxybutynin 5milliGRAM(s) Oral four times a day  insulin glargine Injectable (LANTUS) 80Unit(s) SubCutaneous every morning  insulin glargine Injectable (LANTUS) 80Unit(s) SubCutaneous at bedtime  insulin lispro (HumaLOG) corrective regimen sliding scale  SubCutaneous three times a day before meals  dextrose 5%. 1000milliLiter(s) IV Continuous <Continuous>  dextrose 50% Injectable 12.5Gram(s) IV Push once  dextrose 50% Injectable 25Gram(s) IV Push once  dextrose 50% Injectable 25Gram(s) IV Push once  methylPREDNISolone sodium succinate Injectable 20milliGRAM(s) IV Push every 12 hours  sodium chloride 0.9%. 1000milliLiter(s) IV Continuous <Continuous>  Allergies: Dilaudid (Unknown)  ROS: per HPI, others negative     PE:  Vital Signs Last 24 Hrs  T(C): 36.4, Max: 36.5 (06-16 @ 20:19)  T(F): 97.5, Max: 97.7 (06-16 @ 20:19)  HR: 92 (88 - 97)  BP: 114/58 (114/58 - 141/60)  BP(mean): --  RR: 18 (18 - 18)  SpO2: 94% (94% - 95%)  CAPILLARY BLOOD GLUCOSE  263 (17 Jun 2017 16:04)  465 (17 Jun 2017 10:51)  455 (17 Jun 2017 09:00)  401 (17 Jun 2017 08:11)  425 (17 Jun 2017 05:56)  468 (17 Jun 2017 03:42)  456 (17 Jun 2017 02:48)  530 (16 Jun 2017 23:57)  496 (16 Jun 2017 23:56)  401 (16 Jun 2017 21:24)  NAD. AAOx3. pleasant. No TM.  S1+S2. +wheeze.  Soft. NT. NO LE edema B/L

## 2017-06-17 NOTE — PROVIDER CONTACT NOTE (OTHER) - ACTION/TREATMENT ORDERED:
No intervention at this time- recheck blood sugar prior to breakfast and give insulin as ordered at this time.

## 2017-06-17 NOTE — PROVIDER CONTACT NOTE (OTHER) - ACTION/TREATMENT ORDERED:
MD Will review chart and follow up with orders 500ml bag NS @ 100ml/hr   Recheck blood sugar at next scheduled time

## 2017-06-17 NOTE — PROVIDER CONTACT NOTE (OTHER) - DATE AND TIME:
16-Jun-2017 21:25
17-Jun-2017 02:49
17-Jun-2017 03:46
16-Jun-2017 22:04
16-Jun-2017 23:53
17-Jun-2017 04:22
17-Jun-2017 05:57

## 2017-06-17 NOTE — PROVIDER CONTACT NOTE (OTHER) - REASON
Elevated Blood sugar presists
Elevated blood sugar
uncontrolled blood sugar
BGM remains elevated
Elevate blood sugar

## 2017-06-18 LAB
ANION GAP SERPL CALC-SCNC: 7 MMOL/L — SIGNIFICANT CHANGE UP (ref 5–17)
BUN SERPL-MCNC: 56 MG/DL — HIGH (ref 7–23)
CALCIUM SERPL-MCNC: 9.2 MG/DL — SIGNIFICANT CHANGE UP (ref 8.5–10.1)
CHLORIDE SERPL-SCNC: 103 MMOL/L — SIGNIFICANT CHANGE UP (ref 96–108)
CO2 SERPL-SCNC: 27 MMOL/L — SIGNIFICANT CHANGE UP (ref 22–31)
CREAT SERPL-MCNC: 1.19 MG/DL — SIGNIFICANT CHANGE UP (ref 0.5–1.3)
GLUCOSE SERPL-MCNC: 246 MG/DL — HIGH (ref 70–99)
HCT VFR BLD CALC: 31.9 % — LOW (ref 34.5–45)
HGB BLD-MCNC: 10.4 G/DL — LOW (ref 11.5–15.5)
MCHC RBC-ENTMCNC: 28.9 PG — SIGNIFICANT CHANGE UP (ref 27–34)
MCHC RBC-ENTMCNC: 32.6 GM/DL — SIGNIFICANT CHANGE UP (ref 32–36)
MCV RBC AUTO: 88.9 FL — SIGNIFICANT CHANGE UP (ref 80–100)
PLATELET # BLD AUTO: 280 K/UL — SIGNIFICANT CHANGE UP (ref 150–400)
POTASSIUM SERPL-MCNC: 5.3 MMOL/L — SIGNIFICANT CHANGE UP (ref 3.5–5.3)
POTASSIUM SERPL-SCNC: 5.3 MMOL/L — SIGNIFICANT CHANGE UP (ref 3.5–5.3)
RBC # BLD: 3.59 M/UL — LOW (ref 3.8–5.2)
RBC # FLD: 13.4 % — SIGNIFICANT CHANGE UP (ref 10.3–14.5)
SODIUM SERPL-SCNC: 137 MMOL/L — SIGNIFICANT CHANGE UP (ref 135–145)
WBC # BLD: 16.7 K/UL — HIGH (ref 3.8–10.5)
WBC # FLD AUTO: 16.7 K/UL — HIGH (ref 3.8–10.5)

## 2017-06-18 RX ORDER — ALBUTEROL 90 UG/1
2 AEROSOL, METERED ORAL
Qty: 1 | Refills: 0 | OUTPATIENT
Start: 2017-06-18 | End: 2017-07-18

## 2017-06-18 RX ORDER — LOPERAMIDE HCL 2 MG
2 TABLET ORAL ONCE
Qty: 0 | Refills: 0 | Status: COMPLETED | OUTPATIENT
Start: 2017-06-18 | End: 2017-06-18

## 2017-06-18 RX ORDER — INSULIN GLARGINE 100 [IU]/ML
80 INJECTION, SOLUTION SUBCUTANEOUS
Qty: 60 | Refills: 0 | OUTPATIENT
Start: 2017-06-18 | End: 2017-07-18

## 2017-06-18 RX ORDER — INSULIN LISPRO 100/ML
16 VIAL (ML) SUBCUTANEOUS
Qty: 100 | Refills: 0 | OUTPATIENT
Start: 2017-06-18 | End: 2017-07-18

## 2017-06-18 RX ADMIN — Medication 5 MILLIGRAM(S): at 17:17

## 2017-06-18 RX ADMIN — SODIUM CHLORIDE 50 MILLILITER(S): 9 INJECTION INTRAMUSCULAR; INTRAVENOUS; SUBCUTANEOUS at 05:21

## 2017-06-18 RX ADMIN — Medication 3 MILLILITER(S): at 07:51

## 2017-06-18 RX ADMIN — Medication 3 MILLIGRAM(S): at 22:35

## 2017-06-18 RX ADMIN — Medication 2 MILLIGRAM(S): at 22:35

## 2017-06-18 RX ADMIN — GABAPENTIN 600 MILLIGRAM(S): 400 CAPSULE ORAL at 11:22

## 2017-06-18 RX ADMIN — GABAPENTIN 600 MILLIGRAM(S): 400 CAPSULE ORAL at 05:17

## 2017-06-18 RX ADMIN — Medication 5 MILLIGRAM(S): at 22:36

## 2017-06-18 RX ADMIN — HEPARIN SODIUM 5000 UNIT(S): 5000 INJECTION INTRAVENOUS; SUBCUTANEOUS at 14:34

## 2017-06-18 RX ADMIN — QUETIAPINE FUMARATE 50 MILLIGRAM(S): 200 TABLET, FILM COATED ORAL at 22:36

## 2017-06-18 RX ADMIN — Medication 20 MILLIGRAM(S): at 05:17

## 2017-06-18 RX ADMIN — Medication 5 MILLIGRAM(S): at 11:22

## 2017-06-18 RX ADMIN — ATORVASTATIN CALCIUM 40 MILLIGRAM(S): 80 TABLET, FILM COATED ORAL at 22:35

## 2017-06-18 RX ADMIN — Medication 3 MILLILITER(S): at 13:18

## 2017-06-18 RX ADMIN — Medication 100 MILLIGRAM(S): at 14:34

## 2017-06-18 RX ADMIN — Medication 3 MILLILITER(S): at 01:30

## 2017-06-18 RX ADMIN — HEPARIN SODIUM 5000 UNIT(S): 5000 INJECTION INTRAVENOUS; SUBCUTANEOUS at 22:35

## 2017-06-18 RX ADMIN — Medication 200 MILLIGRAM(S): at 22:36

## 2017-06-18 RX ADMIN — GABAPENTIN 600 MILLIGRAM(S): 400 CAPSULE ORAL at 17:20

## 2017-06-18 RX ADMIN — PANTOPRAZOLE SODIUM 40 MILLIGRAM(S): 20 TABLET, DELAYED RELEASE ORAL at 05:17

## 2017-06-18 RX ADMIN — INSULIN GLARGINE 80 UNIT(S): 100 INJECTION, SOLUTION SUBCUTANEOUS at 08:46

## 2017-06-18 RX ADMIN — Medication 20 MILLIGRAM(S): at 17:17

## 2017-06-18 RX ADMIN — GABAPENTIN 600 MILLIGRAM(S): 400 CAPSULE ORAL at 22:36

## 2017-06-18 RX ADMIN — FAMOTIDINE 20 MILLIGRAM(S): 10 INJECTION INTRAVENOUS at 11:21

## 2017-06-18 RX ADMIN — Medication 2: at 11:43

## 2017-06-18 RX ADMIN — Medication 10 MILLIGRAM(S): at 05:17

## 2017-06-18 RX ADMIN — HEPARIN SODIUM 5000 UNIT(S): 5000 INJECTION INTRAVENOUS; SUBCUTANEOUS at 05:17

## 2017-06-18 RX ADMIN — Medication 4: at 08:46

## 2017-06-18 RX ADMIN — Medication 100 MILLIGRAM(S): at 03:36

## 2017-06-18 RX ADMIN — Medication 200 MILLIGRAM(S): at 05:16

## 2017-06-18 RX ADMIN — Medication 5 MILLIGRAM(S): at 05:17

## 2017-06-18 RX ADMIN — Medication 10 MILLIGRAM(S): at 17:20

## 2017-06-18 RX ADMIN — Medication 10 MILLIGRAM(S): at 11:24

## 2017-06-18 RX ADMIN — Medication 10 MILLIGRAM(S): at 22:36

## 2017-06-18 RX ADMIN — Medication 3 MILLILITER(S): at 21:03

## 2017-06-18 RX ADMIN — LISINOPRIL 2.5 MILLIGRAM(S): 2.5 TABLET ORAL at 05:17

## 2017-06-18 RX ADMIN — Medication 100 MILLIGRAM(S): at 05:17

## 2017-06-18 RX ADMIN — INSULIN GLARGINE 80 UNIT(S): 100 INJECTION, SOLUTION SUBCUTANEOUS at 22:37

## 2017-06-18 NOTE — DISCHARGE NOTE ADULT - MEDICATION SUMMARY - MEDICATIONS TO TAKE
I will START or STAY ON the medications listed below when I get home from the hospital:    predniSONE 10 mg oral tablet  -- 4 tab(s) by mouth once a day x 3 days than 3 tabs daily for 3 days than 2 tabs daily for 3 days than 1 tab daily for 3 days  -- It is very important that you take or use this exactly as directed.  Do not skip doses or discontinue unless directed by your doctor.  Obtain medical advice before taking any non-prescription drugs as some may affect the action of this medication.  Take with food or milk.    -- Indication: For CHRONIC OBSTRUCTIVE PULOMARY DISEASE    lisinopril 2.5 mg oral tablet  -- 1 tab(s) by mouth once a day  -- Indication: For Hypertension    gabapentin 600 mg oral tablet  -- 1 tab(s) by mouth 4 times a day  -- Indication: For Neuropathy    insulin glargine 100 units/mL subcutaneous solution  -- 80 unit(s) subcutaneous 2 times a day  -- Do not drink alcoholic beverages when taking this medication.  It is very important that you take or use this exactly as directed.  Do not skip doses or discontinue unless directed by your doctor.  Keep in refrigerator.  Do not freeze.    -- Indication: For diabetes    insulin lispro 100 units/mL subcutaneous solution  -- Indication: For diabetes    Lipitor 40 mg oral tablet  -- 1 tab(s) by mouth once a day (at bedtime)  -- Indication: For hyperlipidemia    QUEtiapine 50 mg oral tablet  -- 1 tab(s) by mouth 2 times a day  -- Indication: For home medication    albuterol 90 mcg/inh inhalation aerosol  -- 2 puff(s) inhaled every 4 hours, As Needed -for bronchospasm  -- For inhalation only.  It is very important that you take or use this exactly as directed.  Do not skip doses or discontinue unless directed by your doctor.  Obtain medical advice before taking any non-prescription drugs as some may affect the action of this medication.  Shake well before use.    -- Indication: For CHRONIC OBSTRUCTIVE PULOMARY DISEASE    guaiFENesin 100 mg/5 mL oral liquid  -- 10 milliliter(s) by mouth every 6 hours, As needed, Cough  -- Indication: For Cough    Pepcid 20 mg oral tablet  -- 1 tab(s) by mouth once a day  -- Indication: For GERD    Protonix 40 mg oral delayed release tablet  -- 1 tab(s) by mouth once a day  -- Indication: For GERD    levoFLOXacin 500 mg oral tablet  -- 1 tab(s) by mouth once a day  -- Avoid prolonged or excessive exposure to direct and/or artificial sunlight while taking this medication.  Do not take dairy products, antacids, or iron preparations within one hour of this medication.  Finish all this medication unless otherwise directed by prescriber.  May cause drowsiness or dizziness.  Medication should be taken with plenty of water.    -- Indication: For CHRONIC OBSTRUCTIVE PULOMARY DISEASE

## 2017-06-18 NOTE — DISCHARGE NOTE ADULT - PATIENT PORTAL LINK FT
“You can access the Follow[employer] Patient Portal, offered by [facility], by registering with the following website: [email or web address]”

## 2017-06-18 NOTE — DISCHARGE NOTE ADULT - PROVIDER TOKENS
TOKEN:'[id number]',FREE:[LAST:[audra],FIRST:[carlie],PHONE:[(   )    -],FAX:[(   )    -]],FREE:[LAST:[Cardiology],PHONE:[(   )    -],FAX:[(   )    -]],FREE:[LAST:[Pulmonology],PHONE:[(   )    -],FAX:[(   )    -]]

## 2017-06-18 NOTE — DISCHARGE NOTE ADULT - HOSPITAL COURSE
Patient is 56yo female with PMHx of DM, CAD/CABG, HTN, Peripheral neuropathy, COPD/emphysema, Lung Ca diagnosed 2 months ago, not on active treatment, active smoker 1/2ppd admitted on 6/15/17 with SOB. Pt notes SOB started 2 days ago, associated with fever, chills, cough productive of brown sputum, and mild intermittent chest pain associated with coughing. Pt reports she heard herself wheezing, with improvement in SOB after duonebs. No other constitutional symptoms. Patient is an active smoker, cutting down from 3 packs to 1/2pk per day. Denies LE edema, recent travel.       6/16 Pt seen and examined at bedside.  Denies pain.    6/17 Pt was seen and examined.  Chart reviewed.  Breathing improved today.  Less wheezing  6/18 dyspnea and cough improved, eager to go home, denies CP, palpitations, abd pain, afebrile    ROS - all other systems was negative  Vital Signs Last 24 Hrs  T(C): 36.7, Max: 36.7 (06-18 @ 12:51)  T(F): 98.1, Max: 98.1 (06-18 @ 12:51)  HR: 78 (67 - 84)  BP: 143/64 (126/41 - 161/64)  BP(mean): --  RR: 18 (18 - 24)  SpO2: 94% (93% - 98%)    GENERAL: NAD, well-groomed, well-developed  HEAD:  Atraumatic, Normocephalic  EYES: EOMI, PERRLA, conjunctiva and sclera clear  ENMT: Moist mucous membranes  NECK: Supple, No JVD  NERVOUS SYSTEM:  Alert & Oriented X3, Motor Strength 5/5 B/L upper and lower extremities; DTRs 2+ intact and symmetric  CHEST/LUNG: Clear to auscultation bilaterally; No rales, rhonchi, wheezing, or rubs  HEART: Regular rate and rhythm; No murmurs, rubs, or gallops  ABDOMEN: Soft, Nontender, Nondistended; Bowel sounds present  EXTREMITIES:  2+ Peripheral Pulses, No clubbing, cyanosis, or edema    56yo female arrived with SOB    # SOB/COPD exacerbation  - afebrile HD stable, comfortable on 3LNC, sat 94% on ambulation, on exam has end expiratory wheezing  - reports duonebs alleviate SOB  - Solumderol IV 40mg [id number]-->wean to 20mg Q12 H (switch to oral soon)--> PO taper  - Levaquin 500mg IV daily--> complete 5 days course  - Duonebs ATC and as needed  - CXR 6/15 without focal consolidation  - outpatient follwo up with pulmonology and cardiology within 1 week      # DM II  - Lispro insulin sliding scale  - Lantus 80 units BID  - Premeal Lispro 16 AChs  - monitor accuchecks  - HgbA1c = 9.6 on 6/16/2017    # Diabetic Neuropathy  - Gabapentin    # HTN  - BP optimal c/w Lisinopril    # CAD/CABG  - Lisinopril  - Pt not on ASA/Plavix?  - continue with lipitor  - outpatient follow up with cardiology within 1 week    # Leukocytosis, reactive, steroids induced, improving  - Likely related to steroids, afebrile.  Monitor   - repeat in 1 week to establish resolution    # Renal insuff/hyperkalemia  - Noted elevated BUN ? mild dehydration vs steroid related.  Suggest small fluid 500ml NS x 1  - repeat renal function in 1 week    Disposition - medically optimized to be discharged home with close follow up with PCP within 2-3 days, pulmonology, cardiology and endocrinology within 1 week  complete antibiotics  return to ED if fever, abdominal pain, nausea, vomiting, chest pain, dyspnea or other concerns  Discharge plan discussed with patient, RN  Patient advised to follow up with PCP within 3-7 days  time spend 40 min

## 2017-06-18 NOTE — DISCHARGE NOTE ADULT - ADDITIONAL INSTRUCTIONS
follow up with PCP within 2-3 days, pulmonology, cardiology and endocrinology within 1 week  complete antibiotics  return to ED if fever, abdominal pain, nausea, vomiting, chest pain, dyspnea or other concerns

## 2017-06-18 NOTE — DISCHARGE NOTE ADULT - CARE PLAN
Principal Discharge DX:	Chronic obstructive pulmonary disease, unspecified COPD type  Goal:	prevent flare  Instructions for follow-up, activity and diet:	complete antibiotics, use inhaler, follow up with PCP pulmonology within 1 week  Secondary Diagnosis:	Acute renal failure (ARF)  Instructions for follow-up, activity and diet:	repeat renal function in 2-6 days  Secondary Diagnosis:	Diabetes mellitus  Instructions for follow-up, activity and diet:	follow up with endocrynologist  Secondary Diagnosis:	Anemia  Instructions for follow-up, activity and diet:	outpatient work-up as per PCP  Secondary Diagnosis:	Nicotine dependence  Instructions for follow-up, activity and diet:	stop smoking advised, use nicotine patch as outpatient  Secondary Diagnosis:	CAD (coronary artery disease)  Instructions for follow-up, activity and diet:	follow up with cardiology within 1 week, ? needs for aspirin

## 2017-06-18 NOTE — DISCHARGE NOTE ADULT - PLAN OF CARE
prevent flare complete antibiotics, use inhaler, follow up with PCP pulmonology within 1 week repeat renal function in 2-6 days follow up with endocrynologist outpatient work-up as per PCP stop smoking advised, use nicotine patch as outpatient follow up with cardiology within 1 week, ? needs for aspirin

## 2017-06-18 NOTE — DISCHARGE NOTE ADULT - SECONDARY DIAGNOSIS.
Acute renal failure (ARF) Diabetes mellitus Anemia Nicotine dependence CAD (coronary artery disease)

## 2017-06-18 NOTE — PROGRESS NOTE ADULT - ASSESSMENT
58yo female arrived with SOB    # SOB/COPD exacerbation  - afebrile HD stable, comfortable on 3LNC, sat 94%, on exam has end expiratory wheezing  - reports duonebs alleviate SOB  - Solumderol IV 40mg [id number]-->wean to 20mg Q12 H (switch to oral soon)  - Levaquin 500mg IV daily  - Duonebs ATC and as needed  - CXR 6/15 without focal consolidation  - IS    # DM II  - Lispro insulin sliding scale  - Lantus 80 units BID  - Premeal Lispro  - monitor accuchecks  - HgbA1c = 9.6 on 6/16/2017    # Diabetic Neuropathy  - Gabapentin    # HTN  - BP optimal c/w Lisinopril    # CAD/CABG  - Lisinopril  - Pt not on ASA/Plavix?  - continue with lipitor    # Leukocytosis  - Likely related to steroids, afebrile.  Monitor     # Renal insuff/hyperkalemia  - Noted elevated BUN ? mild dehydration vs steroid related.  Suggest small fluid 500ml NS x 1    # DVT ppx, HSQ
56yo female a/w SOB    # SOB/COPD exacerbation  - Afebrile HD stable, comfortable on 3LNC, sat 94%, on exam has end expiratory wheezing  - chest xray 6/15 with no infiltrates  - reports duonebs alleviate SOB  - Solumderol IV 40mg Q8 hours   - continue with Levaquin 500mg oral daily   - Duonebs  ATC and prn     # DM  - Lispro sliding scale  - Lantus 20u qhs  - Premeal Lispro    # Diabetic Neuropathy  - Gabapentin    # HTN  - continue with Lisinopril    # CAD/CABG  - continue with home medication Lisinopril  - Pt not on ASA/Plavix?  - continue with lipitor    # DVT prophylaxsis:  HSQ
56yo female arrived with SOB    # SOB/COPD exacerbation  - afebrile HD stable, comfortable on 3LNC, sat 94% on ambulation, on exam has end expiratory wheezing  - reports duonebs alleviate SOB  - Solumderol IV 40mg [id number]-->wean to 20mg Q12 H (switch to oral soon)--> PO taper  - Levaquin 500mg IV daily--> PO 1 more day  - Duonebs ATC and as needed  - CXR 6/15 without focal consolidation  -o/p pulmonology and cardiology follow up    # DM II  - Lispro insulin sliding scale  - Lantus 80 units BID  - Premeal Lispro  - monitor accuchecks  - HgbA1c = 9.6 on 6/16/2017    # Diabetic Neuropathy  - Gabapentin    # HTN  - BP optimal c/w Lisinopril    # CAD/CABG  - Lisinopril  - Pt not on ASA/Plavix?  - continue with lipitor    # Leukocytosis reactive and steroids induced, improving  - Likely related to steroids, afebrile.  Monitor     # Renal insuff/hyperkalemia  - Noted elevated BUN ? mild dehydration vs steroid related.  Suggest small fluid 500ml NS x 1    # DVT ppx, HSQ    Dispo - patient wants to go home, but she lives in shelter, can not be discharge on Sunday, d/c tomorrow to shelter   SW consult  discharge note done

## 2017-06-18 NOTE — DISCHARGE NOTE ADULT - CARE PROVIDER_API CALL
Jerad Giron), Medicine  [address] [place] [address]  Phone: [phone]  Fax: [phone]    carlie zhu  Phone: (   )    -  Fax: (   )    -    Cardiology,   Phone: (   )    -  Fax: (   )    -    Pulmonology,   Phone: (   )    -  Fax: (   )    -

## 2017-06-18 NOTE — DISCHARGE NOTE ADULT - OTHER SIGNIFICANT FINDINGS
Complete Blood Count in AM (06.18.17 @ 05:59)    WBC Count: 16.7 K/uL    RBC Count: 3.59 M/uL    Hemoglobin: 10.4 g/dL    Hematocrit: 31.9 %    Mean Cell Volume: 88.9 fl    Mean Cell Hemoglobin: 28.9 pg    Mean Cell Hemoglobin Conc: 32.6 gm/dL    Red Cell Distrib Width: 13.4 %    Platelet Count - Automated: 280 K/uL    Basic Metabolic Panel in AM (06.18.17 @ 05:59)    Sodium, Serum: 137 mmol/L    Potassium, Serum: 5.3 mmol/L    Chloride, Serum: 103 mmol/L    Carbon Dioxide, Serum: 27 mmol/L    Anion Gap, Serum: 7 mmol/L    Blood Urea Nitrogen, Serum: 56 mg/dL    Creatinine, Serum: 1.19 mg/dL    Glucose, Serum: 246 mg/dL    Calcium, Total Serum: 9.2 mg/dL      Glucose, Serum (06.17.17 @ 00:14)    Glucose, Serum: 527: Test Name: Glucose  Called by: JBaron   Called to: MARYANN Limon  Read back 2 Pt IDs: y Read back values: Y  Date/Tm: 06/17/17 00:52  Results verified. mg/dL    Hemoglobin A1C, Whole Blood (06.16.17 @ 06:24)    Hemoglobin A1C, Whole Blood: 9.6: Method: Immunoassay       Reference Range                4.0-5.6%       High risk (prediabetic)        5.7-6.4%       Diabetic, diagnostic             >=6.5%       ADA diabetic treatment goal       <7.0%  The Hemoglobin A1c reference ranges are based9.6: on the 2010 recommendations  of  The American Diabetes Association.  Interpretation may vary for children  and  adolescent %    Troponin I, Serum (06.15.17 @ 12:20)    Troponin I, Serum: <0.015: High Sensitivity Troponin and new reference  range effective 7/6/2016 ng/mL    Serum Pro-Brain Natriuretic Peptide (06.15.17 @ 12:20)    Serum Pro-Brain Natriuretic Peptide: 945 pg/mL    CHEST SINGLE VIEW FRONTAL     06/15/2017  Findings:    Prior sternotomy. Heart is mildly prominent. The lungs are grossly clear.   The apices and hemidiaphragms are unremarkable. Degenerative changes of   the visualized osseous structures.        Impression:    No acute disease    No significant interval change as compared to  study of earlier in the day

## 2017-06-19 VITALS
DIASTOLIC BLOOD PRESSURE: 62 MMHG | HEART RATE: 93 BPM | SYSTOLIC BLOOD PRESSURE: 117 MMHG | TEMPERATURE: 98 F | RESPIRATION RATE: 18 BRPM | OXYGEN SATURATION: 92 %

## 2017-06-19 LAB
ANION GAP SERPL CALC-SCNC: 8 MMOL/L — SIGNIFICANT CHANGE UP (ref 5–17)
BUN SERPL-MCNC: 50 MG/DL — HIGH (ref 7–23)
CALCIUM SERPL-MCNC: 9.7 MG/DL — SIGNIFICANT CHANGE UP (ref 8.5–10.1)
CHLORIDE SERPL-SCNC: 101 MMOL/L — SIGNIFICANT CHANGE UP (ref 96–108)
CO2 SERPL-SCNC: 29 MMOL/L — SIGNIFICANT CHANGE UP (ref 22–31)
CREAT SERPL-MCNC: 1.33 MG/DL — HIGH (ref 0.5–1.3)
GLUCOSE SERPL-MCNC: 226 MG/DL — HIGH (ref 70–99)
HCT VFR BLD CALC: 36.6 % — SIGNIFICANT CHANGE UP (ref 34.5–45)
HGB BLD-MCNC: 12.3 G/DL — SIGNIFICANT CHANGE UP (ref 11.5–15.5)
MCHC RBC-ENTMCNC: 29.6 PG — SIGNIFICANT CHANGE UP (ref 27–34)
MCHC RBC-ENTMCNC: 33.6 GM/DL — SIGNIFICANT CHANGE UP (ref 32–36)
MCV RBC AUTO: 88.2 FL — SIGNIFICANT CHANGE UP (ref 80–100)
PLATELET # BLD AUTO: 322 K/UL — SIGNIFICANT CHANGE UP (ref 150–400)
POTASSIUM SERPL-MCNC: 5.2 MMOL/L — SIGNIFICANT CHANGE UP (ref 3.5–5.3)
POTASSIUM SERPL-SCNC: 5.2 MMOL/L — SIGNIFICANT CHANGE UP (ref 3.5–5.3)
RBC # BLD: 4.15 M/UL — SIGNIFICANT CHANGE UP (ref 3.8–5.2)
RBC # FLD: 13.4 % — SIGNIFICANT CHANGE UP (ref 10.3–14.5)
SODIUM SERPL-SCNC: 138 MMOL/L — SIGNIFICANT CHANGE UP (ref 135–145)
TROPONIN I SERPL-MCNC: <0.015 NG/ML — SIGNIFICANT CHANGE UP (ref 0.01–0.04)
WBC # BLD: 12 K/UL — HIGH (ref 3.8–10.5)
WBC # FLD AUTO: 12 K/UL — HIGH (ref 3.8–10.5)

## 2017-06-19 RX ORDER — NICOTINE POLACRILEX 2 MG
1 GUM BUCCAL DAILY
Qty: 0 | Refills: 0 | Status: DISCONTINUED | OUTPATIENT
Start: 2017-06-19 | End: 2017-06-19

## 2017-06-19 RX ORDER — IPRATROPIUM/ALBUTEROL SULFATE 18-103MCG
3 AEROSOL WITH ADAPTER (GRAM) INHALATION
Qty: 30 | Refills: 0 | OUTPATIENT
Start: 2017-06-19

## 2017-06-19 RX ADMIN — Medication 3 MILLILITER(S): at 07:36

## 2017-06-19 RX ADMIN — Medication 10 MILLIGRAM(S): at 05:22

## 2017-06-19 RX ADMIN — Medication 200 MILLIGRAM(S): at 05:22

## 2017-06-19 RX ADMIN — Medication 10 MILLIGRAM(S): at 12:34

## 2017-06-19 RX ADMIN — GABAPENTIN 600 MILLIGRAM(S): 400 CAPSULE ORAL at 12:34

## 2017-06-19 RX ADMIN — Medication 1 PATCH: at 06:21

## 2017-06-19 RX ADMIN — Medication 2: at 08:39

## 2017-06-19 RX ADMIN — LISINOPRIL 2.5 MILLIGRAM(S): 2.5 TABLET ORAL at 05:22

## 2017-06-19 RX ADMIN — INSULIN GLARGINE 80 UNIT(S): 100 INJECTION, SOLUTION SUBCUTANEOUS at 08:40

## 2017-06-19 RX ADMIN — Medication 4: at 12:34

## 2017-06-19 RX ADMIN — HEPARIN SODIUM 5000 UNIT(S): 5000 INJECTION INTRAVENOUS; SUBCUTANEOUS at 05:22

## 2017-06-19 RX ADMIN — Medication 200 MILLIGRAM(S): at 12:38

## 2017-06-19 RX ADMIN — PANTOPRAZOLE SODIUM 40 MILLIGRAM(S): 20 TABLET, DELAYED RELEASE ORAL at 05:22

## 2017-06-19 RX ADMIN — Medication 3 MILLILITER(S): at 02:55

## 2017-06-19 RX ADMIN — FAMOTIDINE 20 MILLIGRAM(S): 10 INJECTION INTRAVENOUS at 12:34

## 2017-06-19 RX ADMIN — Medication 5 MILLIGRAM(S): at 12:34

## 2017-06-19 RX ADMIN — GABAPENTIN 600 MILLIGRAM(S): 400 CAPSULE ORAL at 05:22

## 2017-06-19 RX ADMIN — Medication 40 MILLIGRAM(S): at 05:22

## 2017-06-19 RX ADMIN — Medication 5 MILLIGRAM(S): at 05:22
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
## 2017-06-19 NOTE — ADVANCED PRACTICE NURSE CONSULT - RECOMMEDATIONS
Estelle told patient she would go to her consultation appointment at [place] with her. Written survivorship care plan given to the patient with an explanation of the purpose of  the SCP and its usefulness

## 2017-06-19 NOTE — PROGRESS NOTE ADULT - SUBJECTIVE AND OBJECTIVE BOX
HPI:  Patient is 56yo female with PMHx of DM, CAD/CABG, HTN, Peripheral neuropathy, COPD/emphysema, Lung Ca diagnosed 2 months ago, not on active treatment, active smoker 1/2ppd, presents with SOB. Pt notes SOB started 2 days ago, associated with fever, chills, cough productive of brown sputum, and mild intermittent chest pain associated with coughing. Pt reports she heard herself wheezing, with improvement in SOB after duonebs. No other constitutional symptoms. Patient is an active smoker, cutting down from 3 packs to 1/2pk per day. Denies LE edema, recent travel. (15 Darian 2017 16:07)       SUBJECTIVE & OBJECTIVE: Pt seen and examined at bedside.  Denies pain.      PHYSICAL EXAM:  T(C): 36.6, Max: 37.4 (-15 @ 09:36)  HR: 80 (76 - 98)  BP: 132/56 (114/75 - 132/56)  RR: 17 (16 - 22)  SpO2: 97% (93% - 99%)    GENERAL: NAD, well-groomed, well-developed  HEAD:  Atraumatic, Normocephalic  EYES: EOMI, PERRLA, conjunctiva and sclera clear  ENMT: Moist mucous membranes  NECK: Supple, No JVD  NERVOUS SYSTEM:  Alert & Oriented X3, Motor Strength 5/5 B/L upper and lower extremities; DTRs 2+ intact and symmetric  CHEST/LUNG: Clear to auscultation bilaterally; No rales, rhonchi, wheezing, or rubs  HEART: Regular rate and rhythm; No murmurs, rubs, or gallops  ABDOMEN: Soft, Nontender, Nondistended; Bowel sounds present  EXTREMITIES:  2+ Peripheral Pulses, No clubbing, cyanosis, or edema        MEDICATIONS  (STANDING):  ALBUTerol/ipratropium for Nebulization 3milliLiter(s) Nebulizer every 6 hours  famotidine    Tablet 20milliGRAM(s) Oral daily  pantoprazole    Tablet 40milliGRAM(s) Oral before breakfast  atorvastatin 40milliGRAM(s) Oral at bedtime  QUEtiapine 25milliGRAM(s) Oral two times a day  gabapentin 600milliGRAM(s) Oral four times a day  lisinopril 2.5milliGRAM(s) Oral daily  levoFLOXacin  Tablet 500milliGRAM(s) Oral every 24 hours  methylPREDNISolone sodium succinate Injectable 40milliGRAM(s) IV Push every 8 hours  heparin  Injectable 5000Unit(s) SubCutaneous every 8 hours  docusate sodium 100milliGRAM(s) Oral three times a day  insulin glargine Injectable (LANTUS) 20Unit(s) SubCutaneous every morning  insulin glargine Injectable (LANTUS) 10Unit(s) SubCutaneous at bedtime  insulin lispro Injectable (HumaLOG) 5Unit(s) SubCutaneous before breakfast  insulin lispro Injectable (HumaLOG) 5Unit(s) SubCutaneous before lunch  insulin lispro Injectable (HumaLOG) 5Unit(s) SubCutaneous before dinner  dextrose 5%. 1000milliLiter(s) IV Continuous <Continuous>  dextrose 50% Injectable 12.5Gram(s) IV Push once  dextrose 50% Injectable 25Gram(s) IV Push once  dextrose 50% Injectable 25Gram(s) IV Push once  insulin lispro Injectable (HumaLOG) 10Unit(s) SubCutaneous once    MEDICATIONS  (PRN):  ondansetron Injectable 4milliGRAM(s) IV Push every 6 hours PRN Nausea  ALBUTerol/ipratropium for Nebulization 3milliLiter(s) Nebulizer every 6 hours PRN Shortness of Breath  dextrose Gel 1Dose(s) Oral once PRN Blood Glucose LESS THAN 70 milliGRAM(s)/deciliter  glucagon  Injectable 1milliGRAM(s) IntraMuscular once PRN Glucose LESS THAN 70 milligrams/deciliter      LABS:                        10.5   12.8  )-----------( 256      ( 2017 06:24 )             31.6     06-16    138  |  102  |  38<H>  ----------------------------<  345<H>  4.9   |  26  |  1.42<H>    Ca    8.8      2017 06:24    TPro  6.6  /  Alb  2.8<L>  /  TBili  0.5  /  DBili  x   /  AST  16  /  ALT  14  /  AlkPhos  125<H>  06-15    PT/INR - ( 15 Darian 2017 12:20 )   PT: 12.5 sec;   INR: 1.15 ratio         PTT - ( 15 Darian 2017 12:20 )  PTT:31.3 sec  Urinalysis Basic - ( 15 Darian 2017 15:11 )    Color: Yellow / Appearance: Clear / S.020 / pH: x  Gluc: x / Ketone: Trace  / Bili: Small / Urobili: 8 mg/dL   Blood: x / Protein: 30 mg/dL / Nitrite: Negative   Leuk Esterase: Moderate / RBC: 0-2 /HPF / WBC 3-5   Sq Epi: x / Non Sq Epi: Occasional / Bacteria: Moderate
Patient is 58yo female with PMHx of DM, CAD/CABG, HTN, Peripheral neuropathy, COPD/emphysema, Lung Ca diagnosed 2 months ago, not on active treatment, active smoker 1/2ppd admitted on 6/15/17 with SOB. Pt notes SOB started 2 days ago, associated with fever, chills, cough productive of brown sputum, and mild intermittent chest pain associated with coughing. Pt reports she heard herself wheezing, with improvement in SOB after duonebs. No other constitutional symptoms. Patient is an active smoker, cutting down from 3 packs to 1/2pk per day. Denies LE edema, recent travel.       6/16 Pt seen and examined at bedside.  Denies pain.    6/17 Pt was seen and examined.  Chart reviewed.  Breathing improved today.  Less wheezing  6/18 dyspnea and cough improved, eager to go home, denies CP, palpitations, abd pain, afebrile    ROS - all other systems was negative  PHYSICAL EXAM:  Vital Signs Last 24 Hrs  T(C): 36.7, Max: 36.7 (06-18 @ 12:51)  T(F): 98.1, Max: 98.1 (06-18 @ 12:51)  HR: 78 (67 - 84)  BP: 143/64 (126/41 - 161/64)  BP(mean): --  RR: 18 (18 - 24)  SpO2: 94% (93% - 98%)    GENERAL: NAD, well-groomed, well-developed  HEAD:  Atraumatic, Normocephalic  EYES: EOMI, PERRLA, conjunctiva and sclera clear  ENMT: Moist mucous membranes  NECK: Supple, No JVD  NERVOUS SYSTEM:  Alert & Oriented X3, Motor Strength 5/5 B/L upper and lower extremities; DTRs 2+ intact and symmetric  CHEST/LUNG: BS decreased at bases,  bilaterally; No rales, rhonchi, + wheezing, or rubs  HEART: Regular rate and rhythm; No murmurs, rubs, or gallops  ABDOMEN: Soft, Nontender, Nondistended; Bowel sounds present  EXTREMITIES:  2+ Peripheral Pulses, No clubbing, cyanosis, or edema        MEDICATIONS  (STANDING):  ALBUTerol/ipratropium for Nebulization 3milliLiter(s) Nebulizer every 6 hours  famotidine    Tablet 20milliGRAM(s) Oral daily  pantoprazole    Tablet 40milliGRAM(s) Oral before breakfast  atorvastatin 40milliGRAM(s) Oral at bedtime  QUEtiapine 25milliGRAM(s) Oral two times a day  gabapentin 600milliGRAM(s) Oral four times a day  lisinopril 2.5milliGRAM(s) Oral daily  levoFLOXacin  Tablet 500milliGRAM(s) Oral every 24 hours  methylPREDNISolone sodium succinate Injectable 40milliGRAM(s) IV Push every 8 hours  heparin  Injectable 5000Unit(s) SubCutaneous every 8 hours  docusate sodium 100milliGRAM(s) Oral three times a day  insulin glargine Injectable (LANTUS) 20Unit(s) SubCutaneous every morning  insulin glargine Injectable (LANTUS) 10Unit(s) SubCutaneous at bedtime  insulin lispro Injectable (HumaLOG) 5Unit(s) SubCutaneous before breakfast  insulin lispro Injectable (HumaLOG) 5Unit(s) SubCutaneous before lunch  insulin lispro Injectable (HumaLOG) 5Unit(s) SubCutaneous before dinner  dextrose 5%. 1000milliLiter(s) IV Continuous <Continuous>  dextrose 50% Injectable 12.5Gram(s) IV Push once  dextrose 50% Injectable 25Gram(s) IV Push once  dextrose 50% Injectable 25Gram(s) IV Push once  insulin lispro Injectable (HumaLOG) 10Unit(s) SubCutaneous once    MEDICATIONS  (PRN):  ondansetron Injectable 4milliGRAM(s) IV Push every 6 hours PRN Nausea  ALBUTerol/ipratropium for Nebulization 3milliLiter(s) Nebulizer every 6 hours PRN Shortness of Breath  dextrose Gel 1Dose(s) Oral once PRN Blood Glucose LESS THAN 70 milliGRAM(s)/deciliter  glucagon  Injectable 1milliGRAM(s) IntraMuscular once PRN Glucose LESS THAN 70 milligrams/deciliter      LABS:  06-18    137  |  103  |  56<H>  ----------------------------<  246<H>  5.3   |  27  |  1.19    Ca    9.2      18 Jun 2017 05:59                              10.4   16.7  )-----------( 280      ( 18 Jun 2017 05:59 )             31.9                          10.6   19.4  )-----------( 289                   32.0       135  |  100  |  51  ----------------------------<  406  5.4  |  27  |  1.36      HEST SINGLE VIEW FRONTAL                            PROCEDURE DATE:  06/15/2017        INTERPRETATION:  Exam Date: 6/15/2017 9:44 PM    History: Acute shortness of breath    Technique: Single frontal portable view of the chest with comparison to    study of earlier in the day    Findings:    Prior sternotomy. Heart is mildly prominent. The lungs are grossly clear.   The apices and hemidiaphragms are unremarkable. Degenerative changes of   the visualized osseous structures.        Impression:    No acute disease    No significant interval change as compared to  study of earlier in the day
Patient is 58yo female with PMHx of DM, CAD/CABG, HTN, Peripheral neuropathy, COPD/emphysema, Lung Ca diagnosed 2 months ago, not on active treatment, active smoker 1/2ppd admitted on 6/15/17 with SOB. Pt notes SOB started 2 days ago, associated with fever, chills, cough productive of brown sputum, and mild intermittent chest pain associated with coughing. Pt reports she heard herself wheezing, with improvement in SOB after duonebs. No other constitutional symptoms. Patient is an active smoker, cutting down from 3 packs to 1/2pk per day. Denies LE edema, recent travel.       6/16 Pt seen and examined at bedside.  Denies pain.    6/17 Pt was seen and examined.  Chart reviewed.  Breathing improved today.  Less wheezing  6/18 dyspnea and cough improved, eager to go home, denies CP, palpitations, abd pain, afebrile.  6/19: No complaints. Eager to be discharged. Denies fever, chills, N, V, abd pain, CP, SOB.    ROS - all other systems was negative    Vital Signs Last 24 Hrs  T(C): 36.8, Max: 36.8 (06-18 @ 20:40)  T(F): 98.3, Max: 98.3 (06-19 @ 05:27)  HR: 93 (64 - 93)  BP: 117/62 (117/62 - 159/65)  BP(mean): --  RR: 18 (18 - 18)  SpO2: 92% (92% - 98%)    GENERAL: NAD, well-groomed, well-developed  HEAD:  Atraumatic, Normocephalic  EYES: EOMI, PERRLA, conjunctiva and sclera clear  ENMT: Moist mucous membranes  NECK: Supple, No JVD  NERVOUS SYSTEM:  Alert & Oriented X3, Motor Strength 5/5 B/L upper and lower extremities; DTRs 2+ intact and symmetric  CHEST/LUNG: BS decreased at bases,  bilaterally; No rales, rhonchi, + wheezing, or rubs  HEART: Regular rate and rhythm; No murmurs, rubs, or gallops  ABDOMEN: Soft, Nontender, Nondistended; Bowel sounds present  EXTREMITIES:  2+ Peripheral Pulses, No clubbing, cyanosis, or edema        MEDICATIONS  (STANDING):  ALBUTerol/ipratropium for Nebulization 3milliLiter(s) Nebulizer every 6 hours  famotidine    Tablet 20milliGRAM(s) Oral daily  pantoprazole    Tablet 40milliGRAM(s) Oral before breakfast  atorvastatin 40milliGRAM(s) Oral at bedtime  gabapentin 600milliGRAM(s) Oral four times a day  lisinopril 2.5milliGRAM(s) Oral daily  levoFLOXacin  Tablet 500milliGRAM(s) Oral every 24 hours  heparin  Injectable 5000Unit(s) SubCutaneous every 8 hours  docusate sodium 100milliGRAM(s) Oral three times a day  metoclopramide 10milliGRAM(s) Oral every 6 hours  QUEtiapine 50milliGRAM(s) Oral at bedtime  oxybutynin 5milliGRAM(s) Oral four times a day  insulin glargine Injectable (LANTUS) 80Unit(s) SubCutaneous every morning  insulin glargine Injectable (LANTUS) 80Unit(s) SubCutaneous at bedtime  insulin lispro (HumaLOG) corrective regimen sliding scale  SubCutaneous three times a day before meals  dextrose 5%. 1000milliLiter(s) IV Continuous <Continuous>  dextrose 50% Injectable 12.5Gram(s) IV Push once  dextrose 50% Injectable 25Gram(s) IV Push once  dextrose 50% Injectable 25Gram(s) IV Push once  sodium chloride 0.9%. 1000milliLiter(s) IV Continuous <Continuous>  predniSONE   Tablet 40milliGRAM(s) Oral daily  nicotine -  14 mG/24Hr(s) Patch 1patch Transdermal daily    MEDICATIONS  (PRN):  ondansetron Injectable 4milliGRAM(s) IV Push every 6 hours PRN Nausea  ALBUTerol/ipratropium for Nebulization 3milliLiter(s) Nebulizer every 6 hours PRN Shortness of Breath  dextrose Gel 1Dose(s) Oral once PRN Blood Glucose LESS THAN 70 milliGRAM(s)/deciliter  glucagon  Injectable 1milliGRAM(s) IntraMuscular once PRN Glucose LESS THAN 70 milligrams/deciliter  guaiFENesin   Syrup  (Sugar-Free) 200milliGRAM(s) Oral every 6 hours PRN Cough  melatonin 3milliGRAM(s) Oral at bedtime PRN Insomnia        LABS:  CARDIAC MARKERS ( 19 Jun 2017 05:49 )  <0.015 ng/mL / x     / x     / x     / x                                12.3   12.0  )-----------( 322      ( 19 Jun 2017 05:49 )             36.6     06-19    138  |  101  |  50<H>  ----------------------------<  226<H>  5.2   |  29  |  1.33<H>    Ca    9.7      19 Jun 2017 05:49      CAPILLARY BLOOD GLUCOSE  184 (19 Jun 2017 08:35)  175 (18 Jun 2017 22:34)  149 (18 Jun 2017 17:04)              Assessment and Plan:   · Assessment		  58yo female arrived with SOB    # SOB/COPD exacerbation - RESOLVED.  - afebrile HD stable, comfortable off nasal canula.  - reports duonebs alleviate SOB  - Solumderol IV 40mg [id number]-->wean to 20mg Q12 H (switch to oral soon)--> PO taper as outpatient.  - completed course of Levaquin 500mg IV daily  - Duonebs ATC and as needed  - CXR 6/15 without focal consolidation  -o/p pulmonology and cardiology follow up    # DM II  - Lispro insulin sliding scale  - Lantus 80 units BID  - Premeal Lispro  - monitor accuchecks  - HgbA1c = 9.6 on 6/16/2017    # Diabetic Neuropathy  - Gabapentin    # HTN  - BP optimal c/w Lisinopril    # CAD/CABG  - Lisinopril  - Pt not on ASA/Plavix?  - continue with lipitor    # Leukocytosis reactive and steroids induced, improving  - Likely related to steroids, afebrile.  Monitor     # Renal insuff/hyperkalemia  -Encourage oral hydration. Outpt follow up with pcp.    # DVT ppx, HSQ    Dispo: To emergency housing today.    Attending Statement:  40 minutes spent on total encounter; more than 50% of the visit was spent counseling and/or coordinating care by the attending physician.
Patient requests nicotine patch. Stated 1 PPD smoking history. 14 mg/24 hr patch given. Consider titration as needed.
HPI:  Patient is 56yo female with PMHx of DM, CAD/CABG, HTN, Peripheral neuropathy, COPD/emphysema, Lung Ca diagnosed 2 months ago, not on active treatment, active smoker 1/2ppd, presents with SOB. Pt notes SOB started 2 days ago, associated with fever, chills, cough productive of brown sputum, and mild intermittent chest pain associated with coughing. Pt reports she heard herself wheezing, with improvement in SOB after duonebs. No other constitutional symptoms. Patient is an active smoker, cutting down from 3 packs to 1/2pk per day. Denies LE edema, recent travel. (15 Darian 2017 16:07)      6/16 SUBJECTIVE & OBJECTIVE: Pt seen and examined at bedside.  Denies pain.    6/17 Pt was seen and examined.  Chart reviewed.  Breathing improved today.  Less wheezing    PHYSICAL EXAM:  T(C): 36.4  HR: 80   BP: 114/58  RR: 18  SpO2: 94%    GENERAL: NAD, well-groomed, well-developed  HEAD:  Atraumatic, Normocephalic  EYES: EOMI, PERRLA, conjunctiva and sclera clear  ENMT: Moist mucous membranes  NECK: Supple, No JVD  NERVOUS SYSTEM:  Alert & Oriented X3, Motor Strength 5/5 B/L upper and lower extremities; DTRs 2+ intact and symmetric  CHEST/LUNG: Clear to auscultation bilaterally; No rales, rhonchi, wheezing, or rubs  HEART: Regular rate and rhythm; No murmurs, rubs, or gallops  ABDOMEN: Soft, Nontender, Nondistended; Bowel sounds present  EXTREMITIES:  2+ Peripheral Pulses, No clubbing, cyanosis, or edema        MEDICATIONS  (STANDING):  ALBUTerol/ipratropium for Nebulization 3milliLiter(s) Nebulizer every 6 hours  famotidine    Tablet 20milliGRAM(s) Oral daily  pantoprazole    Tablet 40milliGRAM(s) Oral before breakfast  atorvastatin 40milliGRAM(s) Oral at bedtime  QUEtiapine 25milliGRAM(s) Oral two times a day  gabapentin 600milliGRAM(s) Oral four times a day  lisinopril 2.5milliGRAM(s) Oral daily  levoFLOXacin  Tablet 500milliGRAM(s) Oral every 24 hours  methylPREDNISolone sodium succinate Injectable 40milliGRAM(s) IV Push every 8 hours  heparin  Injectable 5000Unit(s) SubCutaneous every 8 hours  docusate sodium 100milliGRAM(s) Oral three times a day  insulin glargine Injectable (LANTUS) 20Unit(s) SubCutaneous every morning  insulin glargine Injectable (LANTUS) 10Unit(s) SubCutaneous at bedtime  insulin lispro Injectable (HumaLOG) 5Unit(s) SubCutaneous before breakfast  insulin lispro Injectable (HumaLOG) 5Unit(s) SubCutaneous before lunch  insulin lispro Injectable (HumaLOG) 5Unit(s) SubCutaneous before dinner  dextrose 5%. 1000milliLiter(s) IV Continuous <Continuous>  dextrose 50% Injectable 12.5Gram(s) IV Push once  dextrose 50% Injectable 25Gram(s) IV Push once  dextrose 50% Injectable 25Gram(s) IV Push once  insulin lispro Injectable (HumaLOG) 10Unit(s) SubCutaneous once    MEDICATIONS  (PRN):  ondansetron Injectable 4milliGRAM(s) IV Push every 6 hours PRN Nausea  ALBUTerol/ipratropium for Nebulization 3milliLiter(s) Nebulizer every 6 hours PRN Shortness of Breath  dextrose Gel 1Dose(s) Oral once PRN Blood Glucose LESS THAN 70 milliGRAM(s)/deciliter  glucagon  Injectable 1milliGRAM(s) IntraMuscular once PRN Glucose LESS THAN 70 milligrams/deciliter      LABS:                        10.6   19.4  )-----------( 289                   32.0       135  |  100  |  51  ----------------------------<  406  5.4  |  27  |  1.36

## 2017-06-22 DIAGNOSIS — R06.02 SHORTNESS OF BREATH: ICD-10-CM

## 2017-06-22 DIAGNOSIS — I25.10 ATHEROSCLEROTIC HEART DISEASE OF NATIVE CORONARY ARTERY WITHOUT ANGINA PECTORIS: ICD-10-CM

## 2017-06-22 DIAGNOSIS — E87.5 HYPERKALEMIA: ICD-10-CM

## 2017-06-22 DIAGNOSIS — E86.0 DEHYDRATION: ICD-10-CM

## 2017-06-22 DIAGNOSIS — T38.0X5A ADVERSE EFFECT OF GLUCOCORTICOIDS AND SYNTHETIC ANALOGUES, INITIAL ENCOUNTER: ICD-10-CM

## 2017-06-22 DIAGNOSIS — I10 ESSENTIAL (PRIMARY) HYPERTENSION: ICD-10-CM

## 2017-06-22 DIAGNOSIS — D72.829 ELEVATED WHITE BLOOD CELL COUNT, UNSPECIFIED: ICD-10-CM

## 2017-06-22 DIAGNOSIS — C34.90 MALIGNANT NEOPLASM OF UNSPECIFIED PART OF UNSPECIFIED BRONCHUS OR LUNG: ICD-10-CM

## 2017-06-22 DIAGNOSIS — Z88.5 ALLERGY STATUS TO NARCOTIC AGENT: ICD-10-CM
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
## 2017-06-22 DIAGNOSIS — F17.210 NICOTINE DEPENDENCE, CIGARETTES, UNCOMPLICATED: ICD-10-CM

## 2017-06-22 DIAGNOSIS — Z95.1 PRESENCE OF AORTOCORONARY BYPASS GRAFT: ICD-10-CM

## 2017-06-22 DIAGNOSIS — D64.9 ANEMIA, UNSPECIFIED: ICD-10-CM

## 2017-06-22 DIAGNOSIS — J44.1 CHRONIC OBSTRUCTIVE PULMONARY DISEASE WITH (ACUTE) EXACERBATION: ICD-10-CM

## 2017-06-22 DIAGNOSIS — E11.40 TYPE 2 DIABETES MELLITUS WITH DIABETIC NEUROPATHY, UNSPECIFIED: ICD-10-CM

## 2017-07-25 RX ORDER — INSULIN DETEMIR 100/ML (3)
40 INSULIN PEN (ML) SUBCUTANEOUS
Qty: 0 | Refills: 0 | COMMUNITY

## 2017-07-25 RX ORDER — ALBUTEROL 90 UG/1
2 AEROSOL, METERED ORAL
Qty: 0 | Refills: 0 | COMMUNITY

## 2017-07-25 RX ORDER — DIPHENHYDRAMINE HCL 50 MG
1 CAPSULE ORAL
Qty: 0 | Refills: 0 | COMMUNITY

## 2017-07-25 RX ORDER — INSULIN LISPRO 100/ML
16 VIAL (ML) SUBCUTANEOUS
Qty: 0 | Refills: 0 | COMMUNITY

## 2017-07-25 RX ORDER — OXYBUTYNIN CHLORIDE 5 MG
1 TABLET ORAL
Qty: 0 | Refills: 0 | COMMUNITY

## 2017-07-25 RX ORDER — FLUTICASONE FUROATE AND VILANTEROL TRIFENATATE 100; 25 UG/1; UG/1
1 POWDER RESPIRATORY (INHALATION)
Qty: 0 | Refills: 0 | COMMUNITY

## 2017-07-25 RX ORDER — METOCLOPRAMIDE HCL 10 MG
1 TABLET ORAL
Qty: 0 | Refills: 0 | COMMUNITY

## 2017-08-03 ENCOUNTER — INPATIENT (INPATIENT)
Facility: HOSPITAL | Age: 57
LOS: 14 days | Discharge: NURSING, REHAB OR LONG-TERM CARE | End: 2017-08-18
Attending: INTERNAL MEDICINE | Admitting: FAMILY MEDICINE
Payer: MEDICAID

## 2017-08-03 VITALS
DIASTOLIC BLOOD PRESSURE: 61 MMHG | HEART RATE: 87 BPM | TEMPERATURE: 98 F | OXYGEN SATURATION: 94 % | WEIGHT: 210.1 LBS | RESPIRATION RATE: 23 BRPM | HEIGHT: 67 IN | SYSTOLIC BLOOD PRESSURE: 136 MMHG

## 2017-08-03 LAB
ALBUMIN SERPL ELPH-MCNC: 2.8 G/DL — LOW (ref 3.3–5)
ALP SERPL-CCNC: 75 U/L — SIGNIFICANT CHANGE UP (ref 40–120)
ALT FLD-CCNC: 18 U/L — SIGNIFICANT CHANGE UP (ref 12–78)
ANION GAP SERPL CALC-SCNC: 7 MMOL/L — SIGNIFICANT CHANGE UP (ref 5–17)
AST SERPL-CCNC: 25 U/L — SIGNIFICANT CHANGE UP (ref 15–37)
BASOPHILS # BLD AUTO: 0.1 K/UL — SIGNIFICANT CHANGE UP (ref 0–0.2)
BASOPHILS NFR BLD AUTO: 1 % — SIGNIFICANT CHANGE UP (ref 0–2)
BILIRUB SERPL-MCNC: 0.3 MG/DL — SIGNIFICANT CHANGE UP (ref 0.2–1.2)
BUN SERPL-MCNC: 17 MG/DL — SIGNIFICANT CHANGE UP (ref 7–23)
CALCIUM SERPL-MCNC: 8.7 MG/DL — SIGNIFICANT CHANGE UP (ref 8.5–10.1)
CHLORIDE SERPL-SCNC: 107 MMOL/L — SIGNIFICANT CHANGE UP (ref 96–108)
CO2 SERPL-SCNC: 27 MMOL/L — SIGNIFICANT CHANGE UP (ref 22–31)
CREAT SERPL-MCNC: 0.89 MG/DL — SIGNIFICANT CHANGE UP (ref 0.5–1.3)
EOSINOPHIL # BLD AUTO: 0.1 K/UL — SIGNIFICANT CHANGE UP (ref 0–0.5)
EOSINOPHIL NFR BLD AUTO: 1.5 % — SIGNIFICANT CHANGE UP (ref 0–6)
GLUCOSE SERPL-MCNC: 163 MG/DL — HIGH (ref 70–99)
HCT VFR BLD CALC: 34.4 % — LOW (ref 34.5–45)
HGB BLD-MCNC: 11.8 G/DL — SIGNIFICANT CHANGE UP (ref 11.5–15.5)
LYMPHOCYTES # BLD AUTO: 1.6 K/UL — SIGNIFICANT CHANGE UP (ref 1–3.3)
LYMPHOCYTES # BLD AUTO: 19.9 % — SIGNIFICANT CHANGE UP (ref 13–44)
MCHC RBC-ENTMCNC: 29.2 PG — SIGNIFICANT CHANGE UP (ref 27–34)
MCHC RBC-ENTMCNC: 34.4 GM/DL — SIGNIFICANT CHANGE UP (ref 32–36)
MCV RBC AUTO: 84.7 FL — SIGNIFICANT CHANGE UP (ref 80–100)
MONOCYTES # BLD AUTO: 0.5 K/UL — SIGNIFICANT CHANGE UP (ref 0–0.9)
MONOCYTES NFR BLD AUTO: 6.7 % — SIGNIFICANT CHANGE UP (ref 2–14)
NEUTROPHILS # BLD AUTO: 5.7 K/UL — SIGNIFICANT CHANGE UP (ref 1.8–7.4)
NEUTROPHILS NFR BLD AUTO: 71 % — SIGNIFICANT CHANGE UP (ref 43–77)
PLATELET # BLD AUTO: 246 K/UL — SIGNIFICANT CHANGE UP (ref 150–400)
POTASSIUM SERPL-MCNC: 4 MMOL/L — SIGNIFICANT CHANGE UP (ref 3.5–5.3)
POTASSIUM SERPL-SCNC: 4 MMOL/L — SIGNIFICANT CHANGE UP (ref 3.5–5.3)
PROT SERPL-MCNC: 5.9 GM/DL — LOW (ref 6–8.3)
RBC # BLD: 4.06 M/UL — SIGNIFICANT CHANGE UP (ref 3.8–5.2)
RBC # FLD: 14.1 % — SIGNIFICANT CHANGE UP (ref 10.3–14.5)
SODIUM SERPL-SCNC: 141 MMOL/L — SIGNIFICANT CHANGE UP (ref 135–145)
TROPONIN I SERPL-MCNC: 0.08 NG/ML — HIGH (ref 0.01–0.04)
WBC # BLD: 8.1 K/UL — SIGNIFICANT CHANGE UP (ref 3.8–10.5)
WBC # FLD AUTO: 8.1 K/UL — SIGNIFICANT CHANGE UP (ref 3.8–10.5)

## 2017-08-03 PROCEDURE — 93010 ELECTROCARDIOGRAM REPORT: CPT

## 2017-08-03 PROCEDURE — 71010: CPT | Mod: 26

## 2017-08-03 RX ORDER — IPRATROPIUM/ALBUTEROL SULFATE 18-103MCG
3 AEROSOL WITH ADAPTER (GRAM) INHALATION
Qty: 0 | Refills: 0 | Status: DISCONTINUED | OUTPATIENT
Start: 2017-08-03 | End: 2017-08-04

## 2017-08-03 RX ORDER — ONDANSETRON 8 MG/1
4 TABLET, FILM COATED ORAL ONCE
Qty: 0 | Refills: 0 | Status: COMPLETED | OUTPATIENT
Start: 2017-08-03 | End: 2017-08-03

## 2017-08-03 RX ORDER — IPRATROPIUM BROMIDE 0.2 MG/ML
500 SOLUTION, NON-ORAL INHALATION
Qty: 0 | Refills: 0 | Status: DISCONTINUED | OUTPATIENT
Start: 2017-08-03 | End: 2017-08-04

## 2017-08-03 RX ORDER — SODIUM CHLORIDE 9 MG/ML
1000 INJECTION INTRAMUSCULAR; INTRAVENOUS; SUBCUTANEOUS ONCE
Qty: 0 | Refills: 0 | Status: COMPLETED | OUTPATIENT
Start: 2017-08-03 | End: 2017-08-03

## 2017-08-03 RX ORDER — ASPIRIN/CALCIUM CARB/MAGNESIUM 324 MG
325 TABLET ORAL ONCE
Qty: 0 | Refills: 0 | Status: COMPLETED | OUTPATIENT
Start: 2017-08-03 | End: 2017-08-03

## 2017-08-03 RX ADMIN — Medication 500 MICROGRAM(S): at 20:14

## 2017-08-03 RX ADMIN — Medication 3 MILLILITER(S): at 20:14

## 2017-08-03 RX ADMIN — ONDANSETRON 4 MILLIGRAM(S): 8 TABLET, FILM COATED ORAL at 23:00

## 2017-08-03 RX ADMIN — Medication 3 MILLILITER(S): at 20:15

## 2017-08-03 RX ADMIN — Medication 325 MILLIGRAM(S): at 19:23

## 2017-08-03 RX ADMIN — SODIUM CHLORIDE 1000 MILLILITER(S): 9 INJECTION INTRAMUSCULAR; INTRAVENOUS; SUBCUTANEOUS at 21:24

## 2017-08-03 NOTE — ED ADULT NURSE NOTE - OBJECTIVE STATEMENT
Pt presents to ED c/o SOB stating "my chest hurts when I breathe". Pt reports s/s started last night

## 2017-08-03 NOTE — ED PROVIDER NOTE - CARE PLAN
Principal Discharge DX:	Elevated troponin  Secondary Diagnosis:	Chest pain, unspecified type  Secondary Diagnosis:	SOB (shortness of breath)

## 2017-08-03 NOTE — ED ADULT NURSE REASSESSMENT NOTE - NS ED NURSE REASSESS COMMENT FT1
Pt given duo neb treatment per verbal order from Dr Fam. Pt reports relief s/p medication. Pt O2 sat 100% on room air after treatment
Patient resting comfortably at this time. no acute distress noted. Breathing unlabored and even. Will continue to monitor for comfort and safety.

## 2017-08-03 NOTE — ED PROVIDER NOTE - OBJECTIVE STATEMENT
58 y/o female pmhx COPD, emphysema, lung cancer, DM, triple by pass, heart attack presents to ED due to SOB and CP since yesterday. CP is non radiating, states it feels like "someone is sitting on her chest". CP similar to prior HA. c/o nausea denies abd pain.

## 2017-08-03 NOTE — ED ADULT NURSE NOTE - ED STAT RN HANDOFF DETAILS
Report given to MARYANN Rosenthal 3E. Patient asleep, but awake to voice. No acute distress noted. VS stable. Sinus rhythm on the monitor. Transported to floor safely.

## 2017-08-03 NOTE — ED PROVIDER NOTE - MEDICAL DECISION MAKING DETAILS
trop, ct angio, labs, nebs no steroids trop, ct angio, labs, nebs no steroids 2/2 DM and mild wheezing  Troponin elevated, EKG nonischemic. Pt with hx of CABG, unknown compliance, p/w chest pain/pressure and wheezing, elevated trop - will admit for further workup of ACS

## 2017-08-04 LAB
AMMONIA BLD-MCNC: 24 UMOL/L — SIGNIFICANT CHANGE UP (ref 11–32)
ANION GAP SERPL CALC-SCNC: 5 MMOL/L — SIGNIFICANT CHANGE UP (ref 5–17)
ANION GAP SERPL CALC-SCNC: 7 MMOL/L — SIGNIFICANT CHANGE UP (ref 5–17)
APTT BLD: 31.3 SEC — SIGNIFICANT CHANGE UP (ref 27.5–37.4)
BASE EXCESS BLDA CALC-SCNC: -3 MMOL/L — LOW (ref -2–2)
BLOOD GAS COMMENTS ARTERIAL: SIGNIFICANT CHANGE UP
BUN SERPL-MCNC: 14 MG/DL — SIGNIFICANT CHANGE UP (ref 7–23)
BUN SERPL-MCNC: 26 MG/DL — HIGH (ref 7–23)
CALCIUM SERPL-MCNC: 8.1 MG/DL — LOW (ref 8.5–10.1)
CALCIUM SERPL-MCNC: 8.5 MG/DL — SIGNIFICANT CHANGE UP (ref 8.5–10.1)
CHLORIDE SERPL-SCNC: 107 MMOL/L — SIGNIFICANT CHANGE UP (ref 96–108)
CHLORIDE SERPL-SCNC: 110 MMOL/L — HIGH (ref 96–108)
CHOLEST SERPL-MCNC: 163 MG/DL — SIGNIFICANT CHANGE UP (ref 10–199)
CO2 SERPL-SCNC: 25 MMOL/L — SIGNIFICANT CHANGE UP (ref 22–31)
CO2 SERPL-SCNC: 27 MMOL/L — SIGNIFICANT CHANGE UP (ref 22–31)
CREAT SERPL-MCNC: 0.79 MG/DL — SIGNIFICANT CHANGE UP (ref 0.5–1.3)
CREAT SERPL-MCNC: 1.4 MG/DL — HIGH (ref 0.5–1.3)
GLUCOSE SERPL-MCNC: 328 MG/DL — HIGH (ref 70–99)
GLUCOSE SERPL-MCNC: 54 MG/DL — LOW (ref 70–99)
HCO3 BLDA-SCNC: 22 MMOL/L — SIGNIFICANT CHANGE UP (ref 21–29)
HCT VFR BLD CALC: 35.3 % — SIGNIFICANT CHANGE UP (ref 34.5–45)
HDLC SERPL-MCNC: 55 MG/DL — SIGNIFICANT CHANGE UP (ref 40–125)
HGB BLD-MCNC: 11.7 G/DL — SIGNIFICANT CHANGE UP (ref 11.5–15.5)
HOROWITZ INDEX BLDA+IHG-RTO: 36 — SIGNIFICANT CHANGE UP
INR BLD: 1.06 RATIO — SIGNIFICANT CHANGE UP (ref 0.88–1.16)
LACTATE SERPL-SCNC: 0.9 MMOL/L — SIGNIFICANT CHANGE UP (ref 0.7–2)
LACTATE SERPL-SCNC: 1 MMOL/L — SIGNIFICANT CHANGE UP (ref 0.7–2)
LIPID PNL WITH DIRECT LDL SERPL: 84 MG/DL — SIGNIFICANT CHANGE UP
MCHC RBC-ENTMCNC: 28.2 PG — SIGNIFICANT CHANGE UP (ref 27–34)
MCHC RBC-ENTMCNC: 33.1 GM/DL — SIGNIFICANT CHANGE UP (ref 32–36)
MCV RBC AUTO: 85.2 FL — SIGNIFICANT CHANGE UP (ref 80–100)
NT-PROBNP SERPL-SCNC: 1239 PG/ML — HIGH (ref 0–125)
PCO2 BLDA: 42 MMHG — SIGNIFICANT CHANGE UP (ref 32–46)
PH BLDA: 7.35 — SIGNIFICANT CHANGE UP (ref 7.35–7.45)
PLATELET # BLD AUTO: 241 K/UL — SIGNIFICANT CHANGE UP (ref 150–400)
PO2 BLDA: 114 — SIGNIFICANT CHANGE UP
POTASSIUM SERPL-MCNC: 4.1 MMOL/L — SIGNIFICANT CHANGE UP (ref 3.5–5.3)
POTASSIUM SERPL-MCNC: 5.8 MMOL/L — HIGH (ref 3.5–5.3)
POTASSIUM SERPL-SCNC: 4.1 MMOL/L — SIGNIFICANT CHANGE UP (ref 3.5–5.3)
POTASSIUM SERPL-SCNC: 5.8 MMOL/L — HIGH (ref 3.5–5.3)
PROTHROM AB SERPL-ACNC: 11.5 SEC — SIGNIFICANT CHANGE UP (ref 9.8–12.7)
RAPID RVP RESULT: SIGNIFICANT CHANGE UP
RBC # BLD: 4.15 M/UL — SIGNIFICANT CHANGE UP (ref 3.8–5.2)
RBC # FLD: 13.9 % — SIGNIFICANT CHANGE UP (ref 10.3–14.5)
SAO2 % BLDA: 98 — SIGNIFICANT CHANGE UP
SODIUM SERPL-SCNC: 137 MMOL/L — SIGNIFICANT CHANGE UP (ref 135–145)
SODIUM SERPL-SCNC: 144 MMOL/L — SIGNIFICANT CHANGE UP (ref 135–145)
TOTAL CHOLESTEROL/HDL RATIO MEASUREMENT: 3 RATIO — LOW (ref 3.3–7.1)
TRIGL SERPL-MCNC: 121 MG/DL — SIGNIFICANT CHANGE UP (ref 10–149)
TROPONIN I SERPL-MCNC: 0.06 NG/ML — HIGH (ref 0.01–0.04)
TROPONIN I SERPL-MCNC: 0.07 NG/ML — HIGH (ref 0.01–0.04)
WBC # BLD: 8.8 K/UL — SIGNIFICANT CHANGE UP (ref 3.8–10.5)
WBC # FLD AUTO: 8.8 K/UL — SIGNIFICANT CHANGE UP (ref 3.8–10.5)

## 2017-08-04 PROCEDURE — 99285 EMERGENCY DEPT VISIT HI MDM: CPT

## 2017-08-04 PROCEDURE — 93010 ELECTROCARDIOGRAM REPORT: CPT

## 2017-08-04 PROCEDURE — 70450 CT HEAD/BRAIN W/O DYE: CPT | Mod: 26

## 2017-08-04 RX ORDER — OXYBUTYNIN CHLORIDE 5 MG
10 TABLET ORAL THREE TIMES A DAY
Qty: 0 | Refills: 0 | Status: DISCONTINUED | OUTPATIENT
Start: 2017-08-04 | End: 2017-08-18

## 2017-08-04 RX ORDER — INSULIN GLARGINE 100 [IU]/ML
20 INJECTION, SOLUTION SUBCUTANEOUS AT BEDTIME
Qty: 0 | Refills: 0 | Status: DISCONTINUED | OUTPATIENT
Start: 2017-08-04 | End: 2017-08-08

## 2017-08-04 RX ORDER — DEXTROSE 50 % IN WATER 50 %
1 SYRINGE (ML) INTRAVENOUS ONCE
Qty: 0 | Refills: 0 | Status: DISCONTINUED | OUTPATIENT
Start: 2017-08-04 | End: 2017-08-18

## 2017-08-04 RX ORDER — SODIUM CHLORIDE 9 MG/ML
500 INJECTION INTRAMUSCULAR; INTRAVENOUS; SUBCUTANEOUS ONCE
Qty: 0 | Refills: 0 | Status: COMPLETED | OUTPATIENT
Start: 2017-08-04 | End: 2017-08-04

## 2017-08-04 RX ORDER — DEXTROSE 50 % IN WATER 50 %
25 SYRINGE (ML) INTRAVENOUS ONCE
Qty: 0 | Refills: 0 | Status: COMPLETED | OUTPATIENT
Start: 2017-08-04 | End: 2017-08-04

## 2017-08-04 RX ORDER — AZITHROMYCIN 500 MG/1
500 TABLET, FILM COATED ORAL ONCE
Qty: 0 | Refills: 0 | Status: COMPLETED | OUTPATIENT
Start: 2017-08-04 | End: 2017-08-04

## 2017-08-04 RX ORDER — DEXTROSE 50 % IN WATER 50 %
12.5 SYRINGE (ML) INTRAVENOUS ONCE
Qty: 0 | Refills: 0 | Status: DISCONTINUED | OUTPATIENT
Start: 2017-08-04 | End: 2017-08-18

## 2017-08-04 RX ORDER — QUETIAPINE FUMARATE 200 MG/1
50 TABLET, FILM COATED ORAL
Qty: 0 | Refills: 0 | Status: DISCONTINUED | OUTPATIENT
Start: 2017-08-04 | End: 2017-08-11

## 2017-08-04 RX ORDER — ASPIRIN/CALCIUM CARB/MAGNESIUM 324 MG
325 TABLET ORAL DAILY
Qty: 0 | Refills: 0 | Status: DISCONTINUED | OUTPATIENT
Start: 2017-08-04 | End: 2017-08-04

## 2017-08-04 RX ORDER — DEXTROSE 50 % IN WATER 50 %
25 SYRINGE (ML) INTRAVENOUS ONCE
Qty: 0 | Refills: 0 | Status: DISCONTINUED | OUTPATIENT
Start: 2017-08-04 | End: 2017-08-18

## 2017-08-04 RX ORDER — ASPIRIN/CALCIUM CARB/MAGNESIUM 324 MG
81 TABLET ORAL DAILY
Qty: 0 | Refills: 0 | Status: DISCONTINUED | OUTPATIENT
Start: 2017-08-05 | End: 2017-08-18

## 2017-08-04 RX ORDER — ACETAMINOPHEN 500 MG
650 TABLET ORAL EVERY 6 HOURS
Qty: 0 | Refills: 0 | Status: DISCONTINUED | OUTPATIENT
Start: 2017-08-04 | End: 2017-08-18

## 2017-08-04 RX ORDER — DEXTROSE 50 % IN WATER 50 %
12.5 SYRINGE (ML) INTRAVENOUS ONCE
Qty: 0 | Refills: 0 | Status: COMPLETED | OUTPATIENT
Start: 2017-08-04 | End: 2017-08-04

## 2017-08-04 RX ORDER — OXYCODONE AND ACETAMINOPHEN 5; 325 MG/1; MG/1
1 TABLET ORAL EVERY 4 HOURS
Qty: 0 | Refills: 0 | Status: DISCONTINUED | OUTPATIENT
Start: 2017-08-04 | End: 2017-08-08

## 2017-08-04 RX ORDER — GLUCAGON INJECTION, SOLUTION 0.5 MG/.1ML
1 INJECTION, SOLUTION SUBCUTANEOUS ONCE
Qty: 0 | Refills: 0 | Status: DISCONTINUED | OUTPATIENT
Start: 2017-08-04 | End: 2017-08-18

## 2017-08-04 RX ORDER — CLOPIDOGREL BISULFATE 75 MG/1
75 TABLET, FILM COATED ORAL DAILY
Qty: 0 | Refills: 0 | Status: DISCONTINUED | OUTPATIENT
Start: 2017-08-04 | End: 2017-08-16

## 2017-08-04 RX ORDER — INSULIN HUMAN 100 [IU]/ML
INJECTION, SOLUTION SUBCUTANEOUS EVERY 6 HOURS
Qty: 0 | Refills: 0 | Status: DISCONTINUED | OUTPATIENT
Start: 2017-08-04 | End: 2017-08-04

## 2017-08-04 RX ORDER — HEPARIN SODIUM 5000 [USP'U]/ML
5000 INJECTION INTRAVENOUS; SUBCUTANEOUS EVERY 12 HOURS
Qty: 0 | Refills: 0 | Status: DISCONTINUED | OUTPATIENT
Start: 2017-08-04 | End: 2017-08-18

## 2017-08-04 RX ORDER — IPRATROPIUM/ALBUTEROL SULFATE 18-103MCG
3 AEROSOL WITH ADAPTER (GRAM) INHALATION EVERY 6 HOURS
Qty: 0 | Refills: 0 | Status: DISCONTINUED | OUTPATIENT
Start: 2017-08-04 | End: 2017-08-18

## 2017-08-04 RX ORDER — ALBUTEROL 90 UG/1
2 AEROSOL, METERED ORAL EVERY 6 HOURS
Qty: 0 | Refills: 0 | Status: DISCONTINUED | OUTPATIENT
Start: 2017-08-04 | End: 2017-08-18

## 2017-08-04 RX ORDER — PANTOPRAZOLE SODIUM 20 MG/1
40 TABLET, DELAYED RELEASE ORAL
Qty: 0 | Refills: 0 | Status: DISCONTINUED | OUTPATIENT
Start: 2017-08-04 | End: 2017-08-18

## 2017-08-04 RX ORDER — LISINOPRIL 2.5 MG/1
2.5 TABLET ORAL DAILY
Qty: 0 | Refills: 0 | Status: DISCONTINUED | OUTPATIENT
Start: 2017-08-04 | End: 2017-08-18

## 2017-08-04 RX ORDER — AZITHROMYCIN 500 MG/1
TABLET, FILM COATED ORAL
Qty: 0 | Refills: 0 | Status: DISCONTINUED | OUTPATIENT
Start: 2017-08-04 | End: 2017-08-10

## 2017-08-04 RX ORDER — GABAPENTIN 400 MG/1
600 CAPSULE ORAL
Qty: 0 | Refills: 0 | Status: DISCONTINUED | OUTPATIENT
Start: 2017-08-04 | End: 2017-08-18

## 2017-08-04 RX ORDER — INSULIN LISPRO 100/ML
VIAL (ML) SUBCUTANEOUS
Qty: 0 | Refills: 0 | Status: DISCONTINUED | OUTPATIENT
Start: 2017-08-04 | End: 2017-08-18

## 2017-08-04 RX ORDER — FAMOTIDINE 10 MG/ML
20 INJECTION INTRAVENOUS DAILY
Qty: 0 | Refills: 0 | Status: DISCONTINUED | OUTPATIENT
Start: 2017-08-04 | End: 2017-08-18

## 2017-08-04 RX ORDER — ATORVASTATIN CALCIUM 80 MG/1
40 TABLET, FILM COATED ORAL AT BEDTIME
Qty: 0 | Refills: 0 | Status: DISCONTINUED | OUTPATIENT
Start: 2017-08-04 | End: 2017-08-18

## 2017-08-04 RX ORDER — SODIUM CHLORIDE 9 MG/ML
1000 INJECTION, SOLUTION INTRAVENOUS
Qty: 0 | Refills: 0 | Status: DISCONTINUED | OUTPATIENT
Start: 2017-08-04 | End: 2017-08-18

## 2017-08-04 RX ORDER — SODIUM CHLORIDE 9 MG/ML
1000 INJECTION INTRAMUSCULAR; INTRAVENOUS; SUBCUTANEOUS
Qty: 0 | Refills: 0 | Status: DISCONTINUED | OUTPATIENT
Start: 2017-08-04 | End: 2017-08-10

## 2017-08-04 RX ORDER — ALBUTEROL 90 UG/1
2 AEROSOL, METERED ORAL EVERY 6 HOURS
Qty: 0 | Refills: 0 | Status: DISCONTINUED | OUTPATIENT
Start: 2017-08-04 | End: 2017-08-04

## 2017-08-04 RX ORDER — AZITHROMYCIN 500 MG/1
500 TABLET, FILM COATED ORAL EVERY 24 HOURS
Qty: 0 | Refills: 0 | Status: DISCONTINUED | OUTPATIENT
Start: 2017-08-05 | End: 2017-08-10

## 2017-08-04 RX ADMIN — QUETIAPINE FUMARATE 50 MILLIGRAM(S): 200 TABLET, FILM COATED ORAL at 17:52

## 2017-08-04 RX ADMIN — CLOPIDOGREL BISULFATE 75 MILLIGRAM(S): 75 TABLET, FILM COATED ORAL at 13:23

## 2017-08-04 RX ADMIN — Medication 3 MILLILITER(S): at 09:32

## 2017-08-04 RX ADMIN — SODIUM CHLORIDE 100 MILLILITER(S): 9 INJECTION INTRAMUSCULAR; INTRAVENOUS; SUBCUTANEOUS at 22:37

## 2017-08-04 RX ADMIN — PANTOPRAZOLE SODIUM 40 MILLIGRAM(S): 20 TABLET, DELAYED RELEASE ORAL at 06:09

## 2017-08-04 RX ADMIN — ATORVASTATIN CALCIUM 40 MILLIGRAM(S): 80 TABLET, FILM COATED ORAL at 22:35

## 2017-08-04 RX ADMIN — Medication 10 MILLIGRAM(S): at 22:35

## 2017-08-04 RX ADMIN — Medication 10 MILLIGRAM(S): at 10:29

## 2017-08-04 RX ADMIN — GABAPENTIN 600 MILLIGRAM(S): 400 CAPSULE ORAL at 17:52

## 2017-08-04 RX ADMIN — INSULIN GLARGINE 20 UNIT(S): 100 INJECTION, SOLUTION SUBCUTANEOUS at 22:36

## 2017-08-04 RX ADMIN — Medication 325 MILLIGRAM(S): at 10:30

## 2017-08-04 RX ADMIN — AZITHROMYCIN 255 MILLIGRAM(S): 500 TABLET, FILM COATED ORAL at 13:22

## 2017-08-04 RX ADMIN — Medication 3 MILLILITER(S): at 13:21

## 2017-08-04 RX ADMIN — Medication 40 MILLIGRAM(S): at 10:28

## 2017-08-04 RX ADMIN — Medication 12.5 GRAM(S): at 09:15

## 2017-08-04 RX ADMIN — HEPARIN SODIUM 5000 UNIT(S): 5000 INJECTION INTRAVENOUS; SUBCUTANEOUS at 17:52

## 2017-08-04 RX ADMIN — Medication 3 MILLILITER(S): at 20:18

## 2017-08-04 RX ADMIN — HEPARIN SODIUM 5000 UNIT(S): 5000 INJECTION INTRAVENOUS; SUBCUTANEOUS at 06:09

## 2017-08-04 RX ADMIN — GABAPENTIN 600 MILLIGRAM(S): 400 CAPSULE ORAL at 06:09

## 2017-08-04 RX ADMIN — SODIUM CHLORIDE 500 MILLILITER(S): 9 INJECTION INTRAMUSCULAR; INTRAVENOUS; SUBCUTANEOUS at 21:03

## 2017-08-04 RX ADMIN — Medication 2: at 17:52

## 2017-08-04 RX ADMIN — Medication 100 MILLIGRAM(S): at 12:08

## 2017-08-04 RX ADMIN — Medication 650 MILLIGRAM(S): at 11:40

## 2017-08-04 RX ADMIN — FAMOTIDINE 20 MILLIGRAM(S): 10 INJECTION INTRAVENOUS at 10:30

## 2017-08-04 RX ADMIN — GABAPENTIN 600 MILLIGRAM(S): 400 CAPSULE ORAL at 10:30

## 2017-08-04 RX ADMIN — Medication 10 MILLIGRAM(S): at 15:03

## 2017-08-04 RX ADMIN — LISINOPRIL 2.5 MILLIGRAM(S): 2.5 TABLET ORAL at 06:09

## 2017-08-04 NOTE — CONSULT NOTE ADULT - SUBJECTIVE AND OBJECTIVE BOX
Patient is a 57y old  Female who presents with a chief complaint of chest pain (04 Aug 2017 02:33)      HPI:  Pt is a 56 y/o woman with pmhx COPD/emphysema, chronic cough,  lung cancer, DM, MI, CABG,  presenting to the  ED due to SOB and CP since yesterday.   She reports chest pain is non radiating and 9/10.   She states it  feels like "someone is sitting on her chest".  Pt reports resting and lying still, helps the pain.   She c/o nausea and  mild abd pain for which she reports pepcid usually helps.    In the ER, she received Duonebs, I L IVF. Also hycodan was given for a coughing fit.   She received ASA.  Troponin was positive .  Still complaining of chest discomfort unclear if its all from COPD or a cardiac component also. She also complains of severe claudication on both lower extremities, unable to walk for more then 25 ft.  Past Med/Surg Hx   COPD,   Emphysema,   Lung cancer,   DM,   CABG three vessel, MI    Fam Hx:  non-contrib to current adm (04 Aug 2017 02:33)      PAST MEDICAL & SURGICAL HISTORY:  Lung cancer  COPD (chronic obstructive pulmonary disease)  No significant past surgical history      HPI:                PREVIOUS DIAGNOSTIC TESTING:      ECHO  FINDINGS:    STRESS  FINDINGS:    CATHETERIZATION  FINDINGS:    MEDICATIONS  (STANDING):  heparin  Injectable 5000 Unit(s) SubCutaneous every 12 hours  aspirin 325 milliGRAM(s) Oral daily  lisinopril 2.5 milliGRAM(s) Oral daily  gabapentin 600 milliGRAM(s) Oral four times a day  atorvastatin 40 milliGRAM(s) Oral at bedtime  QUEtiapine 50 milliGRAM(s) Oral two times a day  famotidine    Tablet 20 milliGRAM(s) Oral daily  pantoprazole    Tablet 40 milliGRAM(s) Oral before breakfast  insulin regular  human corrective regimen sliding scale   SubCutaneous every 6 hours  dextrose 5%. 1000 milliLiter(s) (50 mL/Hr) IV Continuous <Continuous>  dextrose 50% Injectable 12.5 Gram(s) IV Push once  dextrose 50% Injectable 25 Gram(s) IV Push once  dextrose 50% Injectable 25 Gram(s) IV Push once  oxybutynin 10 milliGRAM(s) Oral three times a day  ALBUTerol/ipratropium for Nebulization 3 milliLiter(s) Nebulizer every 6 hours  predniSONE   Tablet 40 milliGRAM(s) Oral daily  insulin glargine Injectable (LANTUS) 20 Unit(s) SubCutaneous at bedtime    MEDICATIONS  (PRN):  acetaminophen   Tablet. 650 milliGRAM(s) Oral every 6 hours PRN Mild Pain (1 - 3)  ALBUTerol    90 MICROgram(s) HFA Inhaler 2 Puff(s) Inhalation every 6 hours PRN Shortness of Breath and/or Wheezing  guaiFENesin    Syrup 100 milliGRAM(s) Oral every 6 hours PRN Cough  dextrose Gel 1 Dose(s) Oral once PRN Blood Glucose LESS THAN 70 milliGRAM(s)/deciLiter  glucagon  Injectable 1 milliGRAM(s) IntraMuscular once PRN Glucose <70 milliGRAM(s)/deciLiter      FAMILY HISTORY:  No pertinent family history in first degree relatives      SOCIAL HISTORY:    CIGARETTES:    ALCOHOL:    REVIEW OF SYSTEMS:  CONSTITUTIONAL:  No night sweats.  No fatigue, malaise, lethargy.  No fever or chills.  HEENT:  Eyes:  No visual changes.  No eye pain.  No eye discharge.    ENT:  No runny nose.  No epistaxis.  No sinus pain.  No sore throat.  No odynophagia.  No ear pain.  No congestion.  RESPIRATORY:  No cough.  No wheeze.  No hemoptysis.  No shortness of breath.  CARDIOVASCULAR:  No chest pains.  No palpitations. No shortness of breath, orthopnea or PND.  GASTROINTESTINAL:  No abdominal pain.  No nausea or vomiting.  No diarrhea or constipation.  No hematemesis.  No hematochezia.  No melena.  GENITOURINARY:  No urgency.  No frequency.  No dysuria.  No hematuria.  No obstructive symptoms.  No discharge.  No pain.  No significant abnormal bleeding.  MUSCULOSKELETAL:  No musculoskeletal pain.  No joint swelling.  No arthritis.  NEUROLOGICAL:  No tingling or numbness or weakness.  PSYCHIATRIC:  No confusion  SKIN:  No rashes.  No lesions.  No wounds.  ENDOCRINE:  No unexplained weight loss.  No polydipsia.  No polyuria.  No polyphagia.  HEMATOLOGIC:  No anemia.  No purpura.  No petechiae.  No prolonged or excessive bleeding.   ALLERGIC AND IMMUNOLOGIC:  No pruritus.  No swelling.         Vital Signs Last 24 Hrs  T(C): 37.3 (04 Aug 2017 08:37), Max: 37.3 (04 Aug 2017 08:37)  T(F): 99.1 (04 Aug 2017 08:37), Max: 99.1 (04 Aug 2017 08:37)  HR: 84 (04 Aug 2017 08:37) (70 - 87)  BP: 151/54 (04 Aug 2017 08:37) (104/88 - 151/54)  BP(mean): --  RR: 18 (04 Aug 2017 08:37) (14 - 23)  SpO2: 100% (04 Aug 2017 08:37) (94% - 100%)    PHYSICAL EXAM-    Constitutional: The patient appears to be normal, well developed, well nourished and alert and oriented to time, place and person. The patient does not appear acutely ill. The patient is alert.     Head: Head is normocephalic and atraumatic.      Neck: The patient's neck is supple without enlargement, has no palpable thyromegaly nor thyroid nodules and has no jugular venous distention. No audible carotid bruits. There are strong carotid pulses bilaterally. No JVD.     Cardiovascular: Regular rate and rhythm without S3, S4. No murmurs or rubs are appreciated.      Respiratory: The breath sounds in the left lung field are diminished through out. The breath sounds in the right lung field are diminished through out. The patient has no rales and no rhonchi. The patient has mild expiratory  wheezes.     Abdomen: Soft, nontender, nondistended with positive bowel sounds.      Extremity: Very weak DP and PT pulses bilaterally    Neurologic: The patient is alert and oriented.      Skin: No rash, no obvious lesions noted.      Psychiatric: The patient appears to be emotionally stable.      INTERPRETATION OF TELEMETRY:    ECG:    I&O's Detail      LABS:                        11.8   8.1   )-----------( 246      ( 03 Aug 2017 20:42 )             34.4     08-04    144  |  110<H>  |  14  ----------------------------<  54<L>  4.1   |  27  |  0.79    Ca    8.5      04 Aug 2017 06:08    TPro  5.9<L>  /  Alb  2.8<L>  /  TBili  0.3  /  DBili  x   /  AST  25  /  ALT  18  /  AlkPhos  75  08-03    CARDIAC MARKERS ( 04 Aug 2017 06:08 )  0.055 ng/mL / x     / x     / x     / x      CARDIAC MARKERS ( 04 Aug 2017 02:45 )  0.073 ng/mL / x     / x     / x     / x      CARDIAC MARKERS ( 03 Aug 2017 18:50 )  0.078 ng/mL / x     / x     / x     / x          PT/INR - ( 04 Aug 2017 01:06 )   PT: 11.5 sec;   INR: 1.06 ratio         PTT - ( 04 Aug 2017 01:06 )  PTT:31.3 sec    I&O's Summary    BNPSerum Pro-Brain Natriuretic Peptide: 1239 pg/mL (08-04 @ 01:06)    RADIOLOGY & ADDITIONAL STUDIES:

## 2017-08-04 NOTE — CHART NOTE - NSCHARTNOTEFT_GEN_A_CORE
Hospitalist Update Note    Patient seen and examined. Notable end expiratory wheezing, mild dry cough. Endorses smoking 2 pks/day cigarrettes and c/o of midsternal CP.     Vital Signs Last 24 Hrs  T(C): 37.3 (04 Aug 2017 08:37), Max: 37.3 (04 Aug 2017 08:37)  T(F): 99.1 (04 Aug 2017 08:37), Max: 99.1 (04 Aug 2017 08:37)  HR: 84 (04 Aug 2017 08:37) (70 - 87)  BP: 151/54 (04 Aug 2017 08:37) (104/88 - 151/54)  BP(mean): --  RR: 18 (04 Aug 2017 08:37) (14 - 23)  SpO2: 100% (04 Aug 2017 08:37) (94% - 100%)    Trops trended down 0.07 X 2 -->  0.05.     Plan:   # R/o ACS - awaiting cardiac eval for possible stress test, NPO.   - symptoms concerning for COPD exac w/ bronchitis.   - start nebs q6hrs, pred boost 40mg daily, bronchodilators.   - discussed smoking cessation, refuses nicotine patch "it doesn't work".    # AM Hypoglycemia - takes Lantus 80U QHS and notes low AM BS.   - decrease to 20U tonight and trend.   - give dextose now.  - A1c 9.1% last month.

## 2017-08-04 NOTE — H&P ADULT - HISTORY OF PRESENT ILLNESS
Pt is a 58 y/o woman with pmhx COPD, emphysema, lung cancer, DM, triple by pass, heart attack presents to ED due to SOB and CP since yesterday. CP is non radiating, states it feels like "someone is sitting on her chest". CP similar to prior HA. c/o nausea denies abd pain.    Past Med/Surg Hx   COPD,   Emphysema,   Lung cancer,   DM,   CABG three vessel, MI    Fam Hx:  non-contrib to current adm Pt is a 56 y/o woman with pmhx COPD/emphysema, chronic cough,  lung cancer, DM, MI, CABG,  presenting to the  ED due to SOB and CP since yesterday.   She reports chest pain is non radiating and 9/10.   She states it  feels like "someone is sitting on her chest".  Pt reports resting and lying still, helps the pain.   She c/o nausea and  mild abd pain for which she reports pepcid usually helps.    In the ER, she received Duonebs, I L IVF. Also hycodan was given for a coughing fit.   She received ASA.  Troponin was positive .    Past Med/Surg Hx   COPD,   Emphysema,   Lung cancer,   DM,   CABG three vessel, MI    Fam Hx:  non-contrib to current adm

## 2017-08-04 NOTE — CHART NOTE - NSCHARTNOTEFT_GEN_A_CORE
MD contacted for AMS in patient admitted for chest pain/SOB. Pt has history of MI/CABG & COPD and had positive trops on admission, now ~0.05. Was to get cardiac cath today, but delayed until Monday because of temp to 102 F today.     Pt seen and examined bedside. Currently saturating at 98% on 4L nasal canula. BP 98/52. BS ~320. Pt was arousable but could not answer questions and could not follow through on instructions.    Gen: somnolent  Resp: CTA b/l  Cardiac: RRR, clear S1/S2    Vital Signs Last 24 Hrs  T(C): 37.1 (08-04-17 @ 20:37)  T(F): 98.8 (08-04-17 @ 20:37), Max: 102.1 (08-04-17 @ 12:06)  HR: 75 (08-04-17 @ 20:37) (70 - 93)  BP: 93/52 (08-04-17 @ 20:37)  BP(mean): --  RR: 18 (08-04-17 @ 20:37) (14 - 18)  SpO2: 99% (08-04-17 @ 20:37) (95% - 100%)  Wt(kg): --    Pt is stable but w/ AMS.  Ordered ABG, BMP, lactic acid  Will monitor MD contacted for AMS in patient admitted for chest pain/SOB. Pt has history of MI/CABG & COPD and had positive trops on admission, now ~0.05. Was to get cardiac cath today, but delayed until Monday because of temp to 102 F today.     Pt seen and examined bedside. Currently saturating at 98% on 4L nasal canula. BP 98/52. BS ~320. Pt was arousable but could not answer questions and could not follow through on instructions.    Gen: somnolent  Resp: CTA b/l  Cardiac: RRR, clear S1/S2    Vital Signs Last 24 Hrs  T(C): 37.1 (08-04-17 @ 20:37)  T(F): 98.8 (08-04-17 @ 20:37), Max: 102.1 (08-04-17 @ 12:06)  HR: 75 (08-04-17 @ 20:37) (70 - 93)  BP: 93/52 (08-04-17 @ 20:37)  BP(mean): --  RR: 18 (08-04-17 @ 20:37) (14 - 18)  SpO2: 99% (08-04-17 @ 20:37) (95% - 100%)  Wt(kg): --    Pt is stable but w/ AMS.  Ordered ABG, BMP, lactic acid  Will monitor    *Addendum  - ABG and cbc normal  - pt now with slurred speech and trouble finding words- will go for CT head.  -SBW MD contacted for AMS in patient admitted for chest pain/SOB. Pt has history of MI/CABG & COPD and had positive trops on admission, now ~0.05. Was to get cardiac cath today, but delayed until Monday because of temp to 102 F today.     Pt seen and examined bedside. Currently saturating at 98% on 4L nasal canula. BP 98/52. BS ~320. Pt was arousable but could not answer questions and could not follow through on instructions.    Gen: somnolent  Resp: CTA b/l  Cardiac: RRR, clear S1/S2  Neuro: not performed due to lack of patient participation    Vital Signs Last 24 Hrs  T(C): 37.1 (08-04-17 @ 20:37)  T(F): 98.8 (08-04-17 @ 20:37), Max: 102.1 (08-04-17 @ 12:06)  HR: 75 (08-04-17 @ 20:37) (70 - 93)  BP: 93/52 (08-04-17 @ 20:37)  BP(mean): --  RR: 18 (08-04-17 @ 20:37) (14 - 18)  SpO2: 99% (08-04-17 @ 20:37) (95% - 100%)  Wt(kg): --    Pt is stable but w/ AMS.  Ordered ABG, BMP, lactic acid  Will monitor    *Addendum  - ABG and cbc normal  - pt now with slurred speech and trouble finding words- will go for CT head.  -SBW MD contacted for AMS in patient admitted for chest pain/SOB. Pt has history of MI/CABG & COPD and had positive trops on admission, now ~0.05. Was to get cardiac cath today, but delayed until Monday because of temp to 102 F today.     Pt seen and examined bedside. Currently saturating at 98% on 4L nasal canula. BP 98/52. BS ~320. Pt was arousable but could not answer questions and could not follow through on instructions.    Gen: somnolent  Resp: CTA b/l  Cardiac: RRR, clear S1/S2  Neuro: not performed due to lack of patient participation    Vital Signs Last 24 Hrs  T(C): 37.1 (08-04-17 @ 20:37)  T(F): 98.8 (08-04-17 @ 20:37), Max: 102.1 (08-04-17 @ 12:06)  HR: 75 (08-04-17 @ 20:37) (70 - 93)  BP: 93/52 (08-04-17 @ 20:37)  BP(mean): --  RR: 18 (08-04-17 @ 20:37) (14 - 18)  SpO2: 99% (08-04-17 @ 20:37) (95% - 100%)  Wt(kg): --    Pt is stable but w/ AMS.  Ordered ABG, BMP, lactic acid  Will monitor    *Addendum  - ABG and cbc normal  - pt now with slurred speech and trouble finding words- will go for CT head.  -SBW    Addendum    Pt status improved, mental status improved and pt more awake.  CT head negative for acute intracranial pathology, no stroke visualized.  BMP showed elevated Cr - ordered IVF, f/u UA, f/u bladder scan  BMP also showed elevated K (5.8). EKG performed w/ no evidence of peaked T waves or acute ischemia.

## 2017-08-04 NOTE — H&P ADULT - ASSESSMENT
Pt is admitted w/    I. ACS, chest pain, pos troponin  - ASA 325mg given in ER, cont ASA, give statin  - serial troponins and EKGs  - telemetry  - cardiology consult  - NPO after midnight for possible stress test    II.  DMII  - Insulin sliding scale    III. GERD  - cont protonix, pepcid    IV.  Smoking cessation,    - nicotine patch  - social work consult      V. DVT prophylaxis  - heparin Pt is a 56 y/o woman with pmhx COPD/emphysema, chronic cough,  lung cancer, DM, MI, CABG,  presenting to the  ED due to SOB and CP since yesterday.   She reports chest pain is non radiating and 9/10.   She states it  feels like "someone is sitting on her chest".  Pt reports resting and lying still, helps the pain.   She c/o nausea and  mild abd pain for which she reports pepcid usually helps.    In the ER, she received Duonebs, I L IVF. Also hycodan was given for a coughing fit.   She received ASA.  Troponin was positive .    Pt is admitted w/    I. ACS, chest pain, pos troponin  - ASA 325mg given in ER, will cont ASA  - add  statin  - serial troponins and EKGs  - telemetry  - cardiology consult  - NPO after midnight for possible stress test    II.  DMII  - Insulin sliding scale    III. GERD  - cont protonix, pepcid    IV.  Homeless ness, Meds, Smoking cessation  - nicotine patch  - social work consult  - pt unsure of all meds,  she left them at the shelter, will need to review in AM    V. DVT prophylaxis  - heparin

## 2017-08-04 NOTE — H&P ADULT - NSHPREVIEWOFSYSTEMS_GEN_ALL_CORE
ICU Vital Signs Last 24 Hrs    T(F): 98.5 (04 Aug 2017 01:15), Max: 98.5 (04 Aug 2017 01:15)    HR: 70 (04 Aug 2017 01:31) (70 - 87)    BP: 104/88 (04 Aug 2017 01:31) (104/88 - 136/61)    RR: 14    SpO2: 95%

## 2017-08-04 NOTE — CONSULT NOTE ADULT - ASSESSMENT
57 year old female with multiple medical issues  High probability of obstructive CAD either native or grafts  High probability of severe obstructive PAD  Will evaluate with LHC and bilateral runoff

## 2017-08-05 LAB
ANION GAP SERPL CALC-SCNC: 4 MMOL/L — LOW (ref 5–17)
APPEARANCE UR: CLEAR — SIGNIFICANT CHANGE UP
BACTERIA # UR AUTO: (no result)
BILIRUB UR-MCNC: NEGATIVE — SIGNIFICANT CHANGE UP
BUN SERPL-MCNC: 33 MG/DL — HIGH (ref 7–23)
CALCIUM SERPL-MCNC: 8.3 MG/DL — LOW (ref 8.5–10.1)
CHLORIDE SERPL-SCNC: 109 MMOL/L — HIGH (ref 96–108)
CO2 SERPL-SCNC: 28 MMOL/L — SIGNIFICANT CHANGE UP (ref 22–31)
COLOR SPEC: YELLOW — SIGNIFICANT CHANGE UP
CREAT SERPL-MCNC: 1.13 MG/DL — SIGNIFICANT CHANGE UP (ref 0.5–1.3)
DIFF PNL FLD: (no result)
EPI CELLS # UR: SIGNIFICANT CHANGE UP
GLUCOSE SERPL-MCNC: 210 MG/DL — HIGH (ref 70–99)
GLUCOSE UR QL: 250 MG/DL
KETONES UR-MCNC: (no result)
LEUKOCYTE ESTERASE UR-ACNC: (no result)
NITRITE UR-MCNC: POSITIVE
PH UR: 5 — SIGNIFICANT CHANGE UP (ref 5–8)
POTASSIUM SERPL-MCNC: 5 MMOL/L — SIGNIFICANT CHANGE UP (ref 3.5–5.3)
POTASSIUM SERPL-SCNC: 5 MMOL/L — SIGNIFICANT CHANGE UP (ref 3.5–5.3)
PROT UR-MCNC: 30 MG/DL
RBC CASTS # UR COMP ASSIST: SIGNIFICANT CHANGE UP /HPF (ref 0–4)
SODIUM SERPL-SCNC: 141 MMOL/L — SIGNIFICANT CHANGE UP (ref 135–145)
SP GR SPEC: 1.02 — SIGNIFICANT CHANGE UP (ref 1.01–1.02)
UROBILINOGEN FLD QL: NEGATIVE MG/DL — SIGNIFICANT CHANGE UP
WBC UR QL: SIGNIFICANT CHANGE UP

## 2017-08-05 RX ORDER — INSULIN LISPRO 100/ML
2 VIAL (ML) SUBCUTANEOUS ONCE
Qty: 0 | Refills: 0 | Status: COMPLETED | OUTPATIENT
Start: 2017-08-05 | End: 2017-08-05

## 2017-08-05 RX ADMIN — GABAPENTIN 600 MILLIGRAM(S): 400 CAPSULE ORAL at 17:44

## 2017-08-05 RX ADMIN — Medication 3 MILLILITER(S): at 02:15

## 2017-08-05 RX ADMIN — AZITHROMYCIN 255 MILLIGRAM(S): 500 TABLET, FILM COATED ORAL at 11:55

## 2017-08-05 RX ADMIN — HEPARIN SODIUM 5000 UNIT(S): 5000 INJECTION INTRAVENOUS; SUBCUTANEOUS at 17:43

## 2017-08-05 RX ADMIN — HEPARIN SODIUM 5000 UNIT(S): 5000 INJECTION INTRAVENOUS; SUBCUTANEOUS at 05:42

## 2017-08-05 RX ADMIN — PANTOPRAZOLE SODIUM 40 MILLIGRAM(S): 20 TABLET, DELAYED RELEASE ORAL at 05:41

## 2017-08-05 RX ADMIN — LISINOPRIL 2.5 MILLIGRAM(S): 2.5 TABLET ORAL at 05:42

## 2017-08-05 RX ADMIN — Medication 100 MILLIGRAM(S): at 05:45

## 2017-08-05 RX ADMIN — Medication 100 MILLIGRAM(S): at 11:50

## 2017-08-05 RX ADMIN — Medication 10 MILLIGRAM(S): at 15:56

## 2017-08-05 RX ADMIN — GABAPENTIN 600 MILLIGRAM(S): 400 CAPSULE ORAL at 23:26

## 2017-08-05 RX ADMIN — INSULIN GLARGINE 20 UNIT(S): 100 INJECTION, SOLUTION SUBCUTANEOUS at 21:14

## 2017-08-05 RX ADMIN — Medication 10 MILLIGRAM(S): at 21:14

## 2017-08-05 RX ADMIN — ATORVASTATIN CALCIUM 40 MILLIGRAM(S): 80 TABLET, FILM COATED ORAL at 21:14

## 2017-08-05 RX ADMIN — CLOPIDOGREL BISULFATE 75 MILLIGRAM(S): 75 TABLET, FILM COATED ORAL at 11:50

## 2017-08-05 RX ADMIN — Medication 40 MILLIGRAM(S): at 05:42

## 2017-08-05 RX ADMIN — Medication 3 MILLILITER(S): at 09:48

## 2017-08-05 RX ADMIN — Medication 2 UNIT(S): at 00:39

## 2017-08-05 RX ADMIN — Medication 3 MILLILITER(S): at 19:37

## 2017-08-05 RX ADMIN — Medication 3: at 11:54

## 2017-08-05 RX ADMIN — OXYCODONE AND ACETAMINOPHEN 1 TABLET(S): 5; 325 TABLET ORAL at 21:14

## 2017-08-05 RX ADMIN — Medication 81 MILLIGRAM(S): at 11:50

## 2017-08-05 RX ADMIN — Medication 5: at 17:42

## 2017-08-05 RX ADMIN — Medication 1: at 07:54

## 2017-08-05 RX ADMIN — QUETIAPINE FUMARATE 50 MILLIGRAM(S): 200 TABLET, FILM COATED ORAL at 17:45

## 2017-08-05 RX ADMIN — GABAPENTIN 600 MILLIGRAM(S): 400 CAPSULE ORAL at 11:50

## 2017-08-05 RX ADMIN — FAMOTIDINE 20 MILLIGRAM(S): 10 INJECTION INTRAVENOUS at 11:50

## 2017-08-05 RX ADMIN — GABAPENTIN 600 MILLIGRAM(S): 400 CAPSULE ORAL at 05:41

## 2017-08-05 RX ADMIN — Medication 10 MILLIGRAM(S): at 05:46

## 2017-08-05 NOTE — PROVIDER CONTACT NOTE (OTHER) - ACTION/TREATMENT ORDERED:
Dr. Stout came to assess pt immediately accompanied by Dr. Crawford. stat labs ordered, CT head done. humalog and lantus given as per order.pt given bolus pt returned to baseline MS later in shift

## 2017-08-05 NOTE — PROGRESS NOTE ADULT - ASSESSMENT
Pt is a 56 y/o woman with pmhx COPD/emphysema, chronic cough,  lung cancer, DM, MI, CABG,  presenting to the  ED due to SOB and CP:    I. ACS, chest pain, pos troponin  - mostly w/ CP while coughing suggestive of musculoskeletal component.   - trops peaked and downtrended 0.05. Stable EKG and telemetry.   - cont Asp / added Plavix / Statin (new med)/ Lisinopril.   - for Cardiac cath on Monday in the setting of known risk factors.     # Acute Encephalopathy - unclear etiology, normal electrolytes, normal TSH. ABG w/o evidence of CO2 retention.   - negative CTA Head for stroke protocol.   - now at baseline mentation. Patient w/ suspicious behavior, large bag in room she refuses to part with.   - recommend 1:1 obs today. Discussed w/ pt, denies substance abuse.     # Fever, Acute Hypoxic Respiratory distress  # Mild COPD exac w/ bronchitis  - suspect viral illness, no clear infiltrate, neg RVP. Longstanding smoker.   - cont nebs, pred daily, azithro day #2.     II.  DMII  - Insulin sliding scale    III. GERD  - cont protonix, pepcid    IV.  Homeless ness, Meds, Smoking cessation  - nicotine patch  - social work consult    V. DVT prophylaxis  - heparin    Dispo: remain on telemetry for cath on Monday.   Total time > 50 mins.

## 2017-08-05 NOTE — PROVIDER CONTACT NOTE (OTHER) - SITUATION
pt found at change of shift to be lethargic but arousable. unable to answer questions and follow commands without falling asleep

## 2017-08-05 NOTE — PROGRESS NOTE ADULT - SUBJECTIVE AND OBJECTIVE BOX
CC: coughing, CP    HPI: Pt is a 56 y/o woman with pmhx COPD/emphysema, chronic cough,  lung cancer, DM, MI, CABG,  presenting to the  ED due to SOB and CP X1 day. Patient is homeless, resides in the shelter. In the ED, mildly elevated trops noted. Patient admitted to r/o ACS and was about to undergo cardiac cath on 8/4 but deferred in the setting of fever temp 102.     8/5/17: Seen resting, + dry cough and mild wheezing. Smoker of 2pk/day, doesn't want nicotine patch. Events overnight noted, was altered with negative workup.     ROS: neg unless stated above.   Vital Signs Last 24 Hrs  T(C): 36.6 (05 Aug 2017 05:18), Max: 38.9 (04 Aug 2017 12:06)  T(F): 97.8 (05 Aug 2017 05:18), Max: 102.1 (04 Aug 2017 12:06)  HR: 72 (05 Aug 2017 05:18) (72 - 93)  BP: 120/40 (05 Aug 2017 05:18) (93/52 - 147/50)  BP(mean): --  RR: 18 (04 Aug 2017 20:37) (18 - 18)  SpO2: 99% (05 Aug 2017 05:18) (96% - 99%)    PHYSICAL EXAM:  Constitutional: NAD, awake and alert, well-developed  HEENT: PERR, EOMI, Normal Hearing, MMM  Neck: Soft and supple, No LAD, No JVD  Respiratory: Breath sounds are clear bilaterally, No wheezing, rales or rhonchi  Cardiovascular: S1 and S2, regular rate and rhythm, no Murmurs, gallops or rubs  Gastrointestinal: Bowel Sounds present, soft, nontender, nondistended, no guarding, no rebound  Extremities: No peripheral edema  Vascular: 2+ peripheral pulses  Neurological: A/O x 3, no focal deficits  Musculoskeletal: 5/5 strength b/l upper and lower extremities  Skin: No rashes      MEDICATIONS  (STANDING):  heparin  Injectable 5000 Unit(s) SubCutaneous every 12 hours  lisinopril 2.5 milliGRAM(s) Oral daily  gabapentin 600 milliGRAM(s) Oral four times a day  atorvastatin 40 milliGRAM(s) Oral at bedtime  QUEtiapine 50 milliGRAM(s) Oral two times a day  famotidine    Tablet 20 milliGRAM(s) Oral daily  pantoprazole    Tablet 40 milliGRAM(s) Oral before breakfast  dextrose 5%. 1000 milliLiter(s) (50 mL/Hr) IV Continuous <Continuous>  dextrose 50% Injectable 12.5 Gram(s) IV Push once  dextrose 50% Injectable 25 Gram(s) IV Push once  dextrose 50% Injectable 25 Gram(s) IV Push once  oxybutynin 10 milliGRAM(s) Oral three times a day  ALBUTerol/ipratropium for Nebulization 3 milliLiter(s) Nebulizer every 6 hours  predniSONE   Tablet 40 milliGRAM(s) Oral daily  insulin glargine Injectable (LANTUS) 20 Unit(s) SubCutaneous at bedtime  clopidogrel Tablet 75 milliGRAM(s) Oral daily  aspirin enteric coated 81 milliGRAM(s) Oral daily  azithromycin  IVPB 500 milliGRAM(s) IV Intermittent every 24 hours  azithromycin  IVPB   IV Intermittent   insulin lispro (HumaLOG) corrective regimen sliding scale   SubCutaneous three times a day before meals  sodium chloride 0.9%. 1000 milliLiter(s) (100 mL/Hr) IV Continuous <Continuous>      LABS: All Labs Reviewed:                        11.7   8.8   )-----------( 241      ( 04 Aug 2017 20:51 )             35.3     08-05    141  |  109<H>  |  33<H>  ----------------------------<  210<H>  5.0   |  28  |  1.13    Ca    8.3<L>      05 Aug 2017 07:48    TPro  5.9<L>  /  Alb  2.8<L>  /  TBili  0.3  /  DBili  x   /  AST  25  /  ALT  18  /  AlkPhos  75  08-03    PT/INR - ( 04 Aug 2017 01:06 )   PT: 11.5 sec;   INR: 1.06 ratio         PTT - ( 04 Aug 2017 01:06 )  PTT:31.3 sec  CARDIAC MARKERS ( 04 Aug 2017 06:08 )  0.055 ng/mL / x     / x     / x     / x      CARDIAC MARKERS ( 04 Aug 2017 02:45 )  0.073 ng/mL / x     / x     / x     / x      CARDIAC MARKERS ( 03 Aug 2017 18:50 )  0.078 ng/mL / x     / x     / x     / x          Blood Culture: pending  Neg RVP.   < from: CT Brain Stroke Protocol (08.04.17 @ 22:04) >    No CT evidence of acute intracranial hemorrhage, brain edema, mass effect   or acute territorial infarct.  If neurologic symptoms persist follow-up   MRI is recommended for further characterization.

## 2017-08-06 LAB
ANION GAP SERPL CALC-SCNC: 5 MMOL/L — SIGNIFICANT CHANGE UP (ref 5–17)
BUN SERPL-MCNC: 27 MG/DL — HIGH (ref 7–23)
CALCIUM SERPL-MCNC: 9.2 MG/DL — SIGNIFICANT CHANGE UP (ref 8.5–10.1)
CHLORIDE SERPL-SCNC: 105 MMOL/L — SIGNIFICANT CHANGE UP (ref 96–108)
CO2 SERPL-SCNC: 31 MMOL/L — SIGNIFICANT CHANGE UP (ref 22–31)
CREAT SERPL-MCNC: 1.09 MG/DL — SIGNIFICANT CHANGE UP (ref 0.5–1.3)
GLUCOSE SERPL-MCNC: 183 MG/DL — HIGH (ref 70–99)
POTASSIUM SERPL-MCNC: 4.7 MMOL/L — SIGNIFICANT CHANGE UP (ref 3.5–5.3)
POTASSIUM SERPL-SCNC: 4.7 MMOL/L — SIGNIFICANT CHANGE UP (ref 3.5–5.3)
SODIUM SERPL-SCNC: 141 MMOL/L — SIGNIFICANT CHANGE UP (ref 135–145)

## 2017-08-06 RX ORDER — ONDANSETRON 8 MG/1
4 TABLET, FILM COATED ORAL EVERY 4 HOURS
Qty: 0 | Refills: 0 | Status: DISCONTINUED | OUTPATIENT
Start: 2017-08-06 | End: 2017-08-18

## 2017-08-06 RX ORDER — ONDANSETRON 8 MG/1
4 TABLET, FILM COATED ORAL EVERY 4 HOURS
Qty: 0 | Refills: 0 | Status: DISCONTINUED | OUTPATIENT
Start: 2017-08-06 | End: 2017-08-06

## 2017-08-06 RX ORDER — INSULIN LISPRO 100/ML
12 VIAL (ML) SUBCUTANEOUS ONCE
Qty: 0 | Refills: 0 | Status: COMPLETED | OUTPATIENT
Start: 2017-08-06 | End: 2017-08-06

## 2017-08-06 RX ADMIN — PANTOPRAZOLE SODIUM 40 MILLIGRAM(S): 20 TABLET, DELAYED RELEASE ORAL at 05:46

## 2017-08-06 RX ADMIN — Medication 40 MILLIGRAM(S): at 05:46

## 2017-08-06 RX ADMIN — GABAPENTIN 600 MILLIGRAM(S): 400 CAPSULE ORAL at 23:36

## 2017-08-06 RX ADMIN — LISINOPRIL 2.5 MILLIGRAM(S): 2.5 TABLET ORAL at 05:46

## 2017-08-06 RX ADMIN — ATORVASTATIN CALCIUM 40 MILLIGRAM(S): 80 TABLET, FILM COATED ORAL at 21:15

## 2017-08-06 RX ADMIN — Medication 10 MILLIGRAM(S): at 21:15

## 2017-08-06 RX ADMIN — INSULIN GLARGINE 20 UNIT(S): 100 INJECTION, SOLUTION SUBCUTANEOUS at 21:15

## 2017-08-06 RX ADMIN — Medication 12 UNIT(S): at 17:45

## 2017-08-06 RX ADMIN — Medication 4: at 12:09

## 2017-08-06 RX ADMIN — Medication 81 MILLIGRAM(S): at 12:10

## 2017-08-06 RX ADMIN — Medication 10 MILLIGRAM(S): at 05:46

## 2017-08-06 RX ADMIN — GABAPENTIN 600 MILLIGRAM(S): 400 CAPSULE ORAL at 12:10

## 2017-08-06 RX ADMIN — ONDANSETRON 4 MILLIGRAM(S): 8 TABLET, FILM COATED ORAL at 10:08

## 2017-08-06 RX ADMIN — HEPARIN SODIUM 5000 UNIT(S): 5000 INJECTION INTRAVENOUS; SUBCUTANEOUS at 17:18

## 2017-08-06 RX ADMIN — QUETIAPINE FUMARATE 50 MILLIGRAM(S): 200 TABLET, FILM COATED ORAL at 05:46

## 2017-08-06 RX ADMIN — OXYCODONE AND ACETAMINOPHEN 1 TABLET(S): 5; 325 TABLET ORAL at 13:43

## 2017-08-06 RX ADMIN — AZITHROMYCIN 255 MILLIGRAM(S): 500 TABLET, FILM COATED ORAL at 12:10

## 2017-08-06 RX ADMIN — Medication 1: at 08:26

## 2017-08-06 RX ADMIN — CLOPIDOGREL BISULFATE 75 MILLIGRAM(S): 75 TABLET, FILM COATED ORAL at 12:10

## 2017-08-06 RX ADMIN — HEPARIN SODIUM 5000 UNIT(S): 5000 INJECTION INTRAVENOUS; SUBCUTANEOUS at 05:44

## 2017-08-06 RX ADMIN — QUETIAPINE FUMARATE 50 MILLIGRAM(S): 200 TABLET, FILM COATED ORAL at 17:18

## 2017-08-06 RX ADMIN — GABAPENTIN 600 MILLIGRAM(S): 400 CAPSULE ORAL at 17:18

## 2017-08-06 RX ADMIN — Medication 100 MILLIGRAM(S): at 05:45

## 2017-08-06 RX ADMIN — GABAPENTIN 600 MILLIGRAM(S): 400 CAPSULE ORAL at 05:46

## 2017-08-06 RX ADMIN — Medication 100 MILLIGRAM(S): at 17:18

## 2017-08-06 RX ADMIN — Medication 6: at 17:17

## 2017-08-06 RX ADMIN — OXYCODONE AND ACETAMINOPHEN 1 TABLET(S): 5; 325 TABLET ORAL at 21:14

## 2017-08-06 RX ADMIN — FAMOTIDINE 20 MILLIGRAM(S): 10 INJECTION INTRAVENOUS at 12:10

## 2017-08-06 RX ADMIN — Medication 3 MILLILITER(S): at 02:48

## 2017-08-06 RX ADMIN — Medication 10 MILLIGRAM(S): at 13:43

## 2017-08-06 NOTE — PROGRESS NOTE ADULT - SUBJECTIVE AND OBJECTIVE BOX
CC: coughing, CP    HPI: Pt is a 56 y/o woman with pmhx COPD/emphysema, chronic cough,  lung cancer, DM, MI, CABG,  presenting to the  ED due to SOB and CP X1 day. Patient is homeless, resides in the shelter. In the ED, mildly elevated trops noted. Patient admitted to r/o ACS and was about to undergo cardiac cath on 8/4 but deferred in the setting of fever temp 102.     8/5/17: Seen resting, + dry cough and mild wheezing. Smoker of 2pk/day, doesn't want nicotine patch. Events overnight noted, was altered with negative workup.     8/6/17: Slight nausea this AM, minimal cough. Very upset about 1:1 "I'm NOT suicidal", refuses to put belongings in safe. No CP.    ROS: neg unless stated above.   Vital Signs Last 24 Hrs  T(C): 37 (06 Aug 2017 05:44), Max: 37 (06 Aug 2017 05:44)  T(F): 98.6 (06 Aug 2017 05:44), Max: 98.6 (06 Aug 2017 05:44)  HR: 81 (06 Aug 2017 05:44) (75 - 81)  BP: 127/45 (06 Aug 2017 05:44) (127/45 - 127/45)  BP(mean): --  RR: 18 (06 Aug 2017 05:44) (18 - 18)  SpO2: 99% (06 Aug 2017 05:44) (97% - 99%)    PHYSICAL EXAM:  Constitutional: NAD, awake and alert, well-developed middle aged female.   HEENT: PERR, EOMI, Normal Hearing, MMM  Neck: Soft and supple, No LAD, No JVD  Respiratory: Breath sounds audible with mild end expiratory wheezing. No rhonchi.   Cardiovascular: S1 and S2, regular rate and rhythm, no Murmurs, gallops or rubs  Gastrointestinal: Bowel Sounds present, soft, nontender, nondistended, no guarding, no rebound  Extremities: No peripheral edema  Vascular: 2+ peripheral pulses  Neurological: A/O x 3, no focal deficits  Musculoskeletal: 5/5 strength b/l upper and lower extremities  Skin: No rashes    MEDICATIONS  (STANDING):  heparin  Injectable 5000 Unit(s) SubCutaneous every 12 hours  lisinopril 2.5 milliGRAM(s) Oral daily  gabapentin 600 milliGRAM(s) Oral four times a day  atorvastatin 40 milliGRAM(s) Oral at bedtime  QUEtiapine 50 milliGRAM(s) Oral two times a day  famotidine    Tablet 20 milliGRAM(s) Oral daily  pantoprazole    Tablet 40 milliGRAM(s) Oral before breakfast  dextrose 5%. 1000 milliLiter(s) (50 mL/Hr) IV Continuous <Continuous>  dextrose 50% Injectable 12.5 Gram(s) IV Push once  dextrose 50% Injectable 25 Gram(s) IV Push once  dextrose 50% Injectable 25 Gram(s) IV Push once  oxybutynin 10 milliGRAM(s) Oral three times a day  ALBUTerol/ipratropium for Nebulization 3 milliLiter(s) Nebulizer every 6 hours  predniSONE   Tablet 40 milliGRAM(s) Oral daily  insulin glargine Injectable (LANTUS) 20 Unit(s) SubCutaneous at bedtime  clopidogrel Tablet 75 milliGRAM(s) Oral daily  aspirin enteric coated 81 milliGRAM(s) Oral daily  azithromycin  IVPB 500 milliGRAM(s) IV Intermittent every 24 hours  azithromycin  IVPB   IV Intermittent   insulin lispro (HumaLOG) corrective regimen sliding scale   SubCutaneous three times a day before meals  sodium chloride 0.9%. 1000 milliLiter(s) (100 mL/Hr) IV Continuous <Continuous>      LABS: All Labs Reviewed:                        11.7   8.8   )-----------( 241      ( 04 Aug 2017 20:51 )             35.3     08-05    141  |  109<H>  |  33<H>  ----------------------------<  210<H>  5.0   |  28  |  1.13    Ca    8.3<L>      05 Aug 2017 07:48    TPro  5.9<L>  /  Alb  2.8<L>  /  TBili  0.3  /  DBili  x   /  AST  25  /  ALT  18  /  AlkPhos  75  08-03    PT/INR - ( 04 Aug 2017 01:06 )   PT: 11.5 sec;   INR: 1.06 ratio       Hemoglobin A1C, Whole Blood: 9.6: Method: Immunoassay      PTT - ( 04 Aug 2017 01:06 )  PTT:31.3 sec  CARDIAC MARKERS ( 04 Aug 2017 06:08 )  0.055 ng/mL / x     / x     / x     / x      CARDIAC MARKERS ( 04 Aug 2017 02:45 )  0.073 ng/mL / x     / x     / x     / x      CARDIAC MARKERS ( 03 Aug 2017 18:50 )  0.078 ng/mL / x     / x     / x     / x          Blood Culture: pending  Neg RVP.   < from: CT Brain Stroke Protocol (08.04.17 @ 22:04) >    No CT evidence of acute intracranial hemorrhage, brain edema, mass effect   or acute territorial infarct.  If neurologic symptoms persist follow-up   MRI is recommended for further characterization.

## 2017-08-06 NOTE — PROGRESS NOTE ADULT - ASSESSMENT
Pt is a 58 y/o woman with pmhx COPD/emphysema, chronic cough,  lung cancer, DM, MI, CABG,  presenting to the  ED due to SOB and CP:    I. ACS, chest pain, pos troponin  - mostly w/ CP while coughing suggestive of musculoskeletal component.   - trops peaked and downtrended 0.05. Stable EKG and telemetry.   - cont Asp / added Plavix / Statin (new med)/ Lisinopril.   - for Cardiac cath on Monday in the setting of known risk factors.   - NPO past midnight tonight.     # Acute Encephalopathy - unclear etiology, normal electrolytes, normal TSH. ABG w/o evidence of CO2 retention.   - negative CTA Head for stroke protocol.   - now at baseline mentation. Patient w/ suspicious behavior, large bag in room she refuses to part with.   - will DC 1:1 today. Patient denies suicidal ideation.     # Fever, Acute Hypoxic Respiratory distress  # Mild COPD exac w/ bronchitis  - suspect viral illness, no clear infiltrate, neg RVP. Longstanding smoker.   - cont nebs, pred daily, azithro day #3.     II.  DMII - uncontrolled, elevated A1c 9.6%  - Insulin sliding scale, Lantus at night.     III. GERD  - cont protonix, pepcid    IV.  Homeless ness, Meds, Smoking cessation  - nicotine patch  - social work consult    V. DVT prophylaxis  - heparin    Dispo: remain on telemetry for cath on Monday.   Total time > 50 mins.

## 2017-08-07 LAB
ANION GAP SERPL CALC-SCNC: 4 MMOL/L — LOW (ref 5–17)
BUN SERPL-MCNC: 32 MG/DL — HIGH (ref 7–23)
CALCIUM SERPL-MCNC: 9.3 MG/DL — SIGNIFICANT CHANGE UP (ref 8.5–10.1)
CHLORIDE SERPL-SCNC: 103 MMOL/L — SIGNIFICANT CHANGE UP (ref 96–108)
CO2 SERPL-SCNC: 32 MMOL/L — HIGH (ref 22–31)
CREAT SERPL-MCNC: 0.92 MG/DL — SIGNIFICANT CHANGE UP (ref 0.5–1.3)
GLUCOSE SERPL-MCNC: 153 MG/DL — HIGH (ref 70–99)
HCT VFR BLD CALC: 32.1 % — LOW (ref 34.5–45)
HGB BLD-MCNC: 10.7 G/DL — LOW (ref 11.5–15.5)
MCHC RBC-ENTMCNC: 28.6 PG — SIGNIFICANT CHANGE UP (ref 27–34)
MCHC RBC-ENTMCNC: 33.2 GM/DL — SIGNIFICANT CHANGE UP (ref 32–36)
MCV RBC AUTO: 86 FL — SIGNIFICANT CHANGE UP (ref 80–100)
PLATELET # BLD AUTO: 194 K/UL — SIGNIFICANT CHANGE UP (ref 150–400)
POTASSIUM SERPL-MCNC: 5 MMOL/L — SIGNIFICANT CHANGE UP (ref 3.5–5.3)
POTASSIUM SERPL-SCNC: 5 MMOL/L — SIGNIFICANT CHANGE UP (ref 3.5–5.3)
RBC # BLD: 3.74 M/UL — LOW (ref 3.8–5.2)
RBC # FLD: 13.9 % — SIGNIFICANT CHANGE UP (ref 10.3–14.5)
SODIUM SERPL-SCNC: 139 MMOL/L — SIGNIFICANT CHANGE UP (ref 135–145)
WBC # BLD: 9.3 K/UL — SIGNIFICANT CHANGE UP (ref 3.8–10.5)
WBC # FLD AUTO: 9.3 K/UL — SIGNIFICANT CHANGE UP (ref 3.8–10.5)

## 2017-08-07 RX ADMIN — OXYCODONE AND ACETAMINOPHEN 1 TABLET(S): 5; 325 TABLET ORAL at 01:02

## 2017-08-07 RX ADMIN — OXYCODONE AND ACETAMINOPHEN 1 TABLET(S): 5; 325 TABLET ORAL at 05:25

## 2017-08-07 RX ADMIN — GABAPENTIN 600 MILLIGRAM(S): 400 CAPSULE ORAL at 05:25

## 2017-08-07 RX ADMIN — Medication 3 MILLILITER(S): at 09:15

## 2017-08-07 RX ADMIN — Medication 10 MILLIGRAM(S): at 05:25

## 2017-08-07 RX ADMIN — FAMOTIDINE 20 MILLIGRAM(S): 10 INJECTION INTRAVENOUS at 15:30

## 2017-08-07 RX ADMIN — Medication 3 MILLILITER(S): at 02:03

## 2017-08-07 RX ADMIN — QUETIAPINE FUMARATE 50 MILLIGRAM(S): 200 TABLET, FILM COATED ORAL at 19:36

## 2017-08-07 RX ADMIN — Medication 81 MILLIGRAM(S): at 15:26

## 2017-08-07 RX ADMIN — Medication 5: at 17:00

## 2017-08-07 RX ADMIN — QUETIAPINE FUMARATE 50 MILLIGRAM(S): 200 TABLET, FILM COATED ORAL at 05:26

## 2017-08-07 RX ADMIN — Medication 10 MILLIGRAM(S): at 23:07

## 2017-08-07 RX ADMIN — AZITHROMYCIN 255 MILLIGRAM(S): 500 TABLET, FILM COATED ORAL at 17:02

## 2017-08-07 RX ADMIN — Medication 40 MILLIGRAM(S): at 05:26

## 2017-08-07 RX ADMIN — ATORVASTATIN CALCIUM 40 MILLIGRAM(S): 80 TABLET, FILM COATED ORAL at 23:07

## 2017-08-07 RX ADMIN — Medication 3 MILLILITER(S): at 20:02

## 2017-08-07 RX ADMIN — Medication 100 MILLIGRAM(S): at 17:00

## 2017-08-07 RX ADMIN — Medication 100 MILLIGRAM(S): at 23:09

## 2017-08-07 RX ADMIN — INSULIN GLARGINE 20 UNIT(S): 100 INJECTION, SOLUTION SUBCUTANEOUS at 23:08

## 2017-08-07 RX ADMIN — PANTOPRAZOLE SODIUM 40 MILLIGRAM(S): 20 TABLET, DELAYED RELEASE ORAL at 05:26

## 2017-08-07 RX ADMIN — CLOPIDOGREL BISULFATE 75 MILLIGRAM(S): 75 TABLET, FILM COATED ORAL at 15:25

## 2017-08-07 RX ADMIN — GABAPENTIN 600 MILLIGRAM(S): 400 CAPSULE ORAL at 23:07

## 2017-08-07 RX ADMIN — Medication 100 MILLIGRAM(S): at 01:02

## 2017-08-07 RX ADMIN — LISINOPRIL 2.5 MILLIGRAM(S): 2.5 TABLET ORAL at 05:25

## 2017-08-07 RX ADMIN — GABAPENTIN 600 MILLIGRAM(S): 400 CAPSULE ORAL at 15:26

## 2017-08-07 RX ADMIN — GABAPENTIN 600 MILLIGRAM(S): 400 CAPSULE ORAL at 19:35

## 2017-08-07 RX ADMIN — HEPARIN SODIUM 5000 UNIT(S): 5000 INJECTION INTRAVENOUS; SUBCUTANEOUS at 19:35

## 2017-08-07 RX ADMIN — Medication 10 MILLIGRAM(S): at 15:27

## 2017-08-07 NOTE — PROGRESS NOTE ADULT - ASSESSMENT
Pt is a 56 y/o woman with pmhx COPD/emphysema, chronic cough,  lung cancer, DM, MI, CABG,  presenting to the  ED due to SOB and CP:    I. ACS, chest pain, pos troponin  - mostly w/ CP while coughing suggestive of musculoskeletal component.   - trops peaked and downtrended 0.05. Stable EKG and telemetry.   - cont Asp / added Plavix / Statin (new med)/ Lisinopril.   - pending cardiac cath today.     # Acute Encephalopathy - unclear etiology, normal electrolytes, normal TSH. ABG w/o evidence of CO2 retention.   - negative CTA Head for stroke protocol.   - now at baseline mentation. Patient w/ suspicious behavior, large bag in room she refuses to part with.   - will DC 1:1 today. Patient denies suicidal ideation.     # Fever, Acute Hypoxic Respiratory distress  # Mild COPD exac w/ bronchitis  - suspect viral illness, no clear infiltrate, neg RVP. Longstanding smoker.   - cont nebs, pred daily, azithro day #4.     II.  DMII - uncontrolled, elevated A1c 9.6%  - Insulin sliding scale, Lantus at night.     III. GERD  - cont protonix, pepcid    IV.  Homeless ness, Meds, Smoking cessation  - nicotine patch  - social work consult    V. DVT prophylaxis- heparin    Dispo: remain on telemetry for cath today. Pending results may discharge today.   Total time > 40 mins.

## 2017-08-07 NOTE — PROGRESS NOTE ADULT - SUBJECTIVE AND OBJECTIVE BOX
CC: coughing, CP    HPI: Pt is a 58 y/o woman with pmhx COPD/emphysema, chronic cough,  lung cancer, DM, MI, CABG,  presenting to the  ED due to SOB and CP X1 day. Patient is homeless, resides in the shelter. In the ED, mildly elevated trops noted. Patient admitted to r/o ACS and was about to undergo cardiac cath on 8/4 but deferred in the setting of fever temp 102.     8/5/17: Seen resting, + dry cough and mild wheezing. Smoker of 2pk/day, doesn't want nicotine patch. Events overnight noted, was altered with negative workup.     8/6/17: Slight nausea this AM, minimal cough. Very upset about 1:1 "I'm NOT suicidal", refuses to put belongings in safe. No CP.    8/717: NO CP or SOB. Ambulatory. Does not need 1:1. Awaiting cardiac cath today.    ROS: neg unless stated above.   Vital Signs Last 24 Hrs  T(C): 36.7 (07 Aug 2017 04:56), Max: 37 (06 Aug 2017 16:03)  T(F): 98 (07 Aug 2017 04:56), Max: 98.6 (06 Aug 2017 16:03)  HR: 69 (07 Aug 2017 04:56) (69 - 88)  BP: 141/56 (07 Aug 2017 04:56) (109/50 - 141/56)  BP(mean): --  RR: 18 (06 Aug 2017 16:03) (17 - 18)  SpO2: 100% (07 Aug 2017 04:56) (94% - 100%)    PHYSICAL EXAM:  Constitutional: NAD, awake and alert, well-developed middle aged female.   HEENT: PERR, EOMI, Normal Hearing, MMM  Neck: Soft and supple, No LAD, No JVD  Respiratory: Breath sounds audible with mild end expiratory wheezing - improved. No rhonchi.   Cardiovascular: S1 and S2, regular rate and rhythm, no Murmurs, gallops or rubs  Gastrointestinal: Bowel Sounds present, soft, nontender, nondistended, no guarding, no rebound  Extremities: No peripheral edema  Vascular: 2+ peripheral pulses  Neurological: A/O x 3, no focal deficits  Musculoskeletal: 5/5 strength b/l upper and lower extremities  Skin: No rashes    MEDICATIONS  (STANDING):  heparin  Injectable 5000 Unit(s) SubCutaneous every 12 hours  lisinopril 2.5 milliGRAM(s) Oral daily  gabapentin 600 milliGRAM(s) Oral four times a day  atorvastatin 40 milliGRAM(s) Oral at bedtime  QUEtiapine 50 milliGRAM(s) Oral two times a day  famotidine    Tablet 20 milliGRAM(s) Oral daily  pantoprazole    Tablet 40 milliGRAM(s) Oral before breakfast  dextrose 5%. 1000 milliLiter(s) (50 mL/Hr) IV Continuous <Continuous>  dextrose 50% Injectable 12.5 Gram(s) IV Push once  dextrose 50% Injectable 25 Gram(s) IV Push once  dextrose 50% Injectable 25 Gram(s) IV Push once  oxybutynin 10 milliGRAM(s) Oral three times a day  ALBUTerol/ipratropium for Nebulization 3 milliLiter(s) Nebulizer every 6 hours  predniSONE   Tablet 40 milliGRAM(s) Oral daily  insulin glargine Injectable (LANTUS) 20 Unit(s) SubCutaneous at bedtime  clopidogrel Tablet 75 milliGRAM(s) Oral daily  aspirin enteric coated 81 milliGRAM(s) Oral daily  azithromycin  IVPB 500 milliGRAM(s) IV Intermittent every 24 hours  azithromycin  IVPB   IV Intermittent   insulin lispro (HumaLOG) corrective regimen sliding scale   SubCutaneous three times a day before meals  sodium chloride 0.9%. 1000 milliLiter(s) (100 mL/Hr) IV Continuous <Continuous>      LABS: All Labs Reviewed:                        11.7   8.8   )-----------( 241      ( 04 Aug 2017 20:51 )             35.3     08-05    141  |  109<H>  |  33<H>  ----------------------------<  210<H>  5.0   |  28  |  1.13    Ca    8.3<L>      05 Aug 2017 07:48    TPro  5.9<L>  /  Alb  2.8<L>  /  TBili  0.3  /  DBili  x   /  AST  25  /  ALT  18  /  AlkPhos  75  08-03    PT/INR - ( 04 Aug 2017 01:06 )   PT: 11.5 sec;   INR: 1.06 ratio       Hemoglobin A1C, Whole Blood: 9.6: Method: Immunoassay      PTT - ( 04 Aug 2017 01:06 )  PTT:31.3 sec  CARDIAC MARKERS ( 04 Aug 2017 06:08 )  0.055 ng/mL / x     / x     / x     / x      CARDIAC MARKERS ( 04 Aug 2017 02:45 )  0.073 ng/mL / x     / x     / x     / x      CARDIAC MARKERS ( 03 Aug 2017 18:50 )  0.078 ng/mL / x     / x     / x     / x          Blood Culture: pending  Neg RVP.   < from: CT Brain Stroke Protocol (08.04.17 @ 22:04) >    No CT evidence of acute intracranial hemorrhage, brain edema, mass effect   or acute territorial infarct.  If neurologic symptoms persist follow-up   MRI is recommended for further characterization.

## 2017-08-07 NOTE — CHART NOTE - NSCHARTNOTEFT_GEN_A_CORE
Nurse Practitioner Progress note:     HPI:  Pt is a 56 y/o woman with pmhx COPD/emphysema, chronic cough,  lung cancer, DM, MI, CABG,  presenting to the  ED due to SOB and CP since yesterday.   She reports chest pain is non radiating and 9/10.   She states it  feels like "someone is sitting on her chest".  Pt reports resting and lying still, helps the pain.   She c/o nausea and  mild abd pain for which she reports pepcid usually helps.    In the ER, she received Duonebs, I L IVF. Also hycodan was given for a coughing fit.   She received ASA.  Troponin was positive .      PHYSICAL EXAM:  V/S:  /67      HR  77      RR 16  Neurologic: Non-focal, A&Ox3.  No neuro deficits  Cardiac : (+)S1S2,RRR  Lungs: CTA B/L  Procedure Site: Rt. femoral mynx closure device noted, site benign soft no bleeding no hematoma 2+DP      PROCEDURE RESULTS: S/P LHC & peripheral angio :  non-obstructive CAD, (+) Rt pop. A. & Lt SFA occlusion with collaterals. recommend medical Mx.    ASSESSMENT/PLAN: 	  -VS, labs, diet, activity as per post cath orders  -Encourage PO fluids  -Continue current medications  -Plan of care D/W pt. and MD  -Post cath instructions reviewed with pt. then pt. verbalizes understanding   -Follow-up with attending.

## 2017-08-08 LAB
ANION GAP SERPL CALC-SCNC: 8 MMOL/L — SIGNIFICANT CHANGE UP (ref 5–17)
BUN SERPL-MCNC: 32 MG/DL — HIGH (ref 7–23)
CALCIUM SERPL-MCNC: 9.2 MG/DL — SIGNIFICANT CHANGE UP (ref 8.5–10.1)
CHLORIDE SERPL-SCNC: 101 MMOL/L — SIGNIFICANT CHANGE UP (ref 96–108)
CO2 SERPL-SCNC: 27 MMOL/L — SIGNIFICANT CHANGE UP (ref 22–31)
CREAT SERPL-MCNC: 1.04 MG/DL — SIGNIFICANT CHANGE UP (ref 0.5–1.3)
GLUCOSE SERPL-MCNC: 292 MG/DL — HIGH (ref 70–99)
POTASSIUM SERPL-MCNC: 5 MMOL/L — SIGNIFICANT CHANGE UP (ref 3.5–5.3)
POTASSIUM SERPL-SCNC: 5 MMOL/L — SIGNIFICANT CHANGE UP (ref 3.5–5.3)
SODIUM SERPL-SCNC: 136 MMOL/L — SIGNIFICANT CHANGE UP (ref 135–145)

## 2017-08-08 RX ORDER — HUMAN INSULIN 100 [IU]/ML
10 INJECTION, SUSPENSION SUBCUTANEOUS ONCE
Qty: 0 | Refills: 0 | Status: COMPLETED | OUTPATIENT
Start: 2017-08-08 | End: 2017-08-08

## 2017-08-08 RX ORDER — INSULIN LISPRO 100/ML
10 VIAL (ML) SUBCUTANEOUS ONCE
Qty: 0 | Refills: 0 | Status: COMPLETED | OUTPATIENT
Start: 2017-08-08 | End: 2017-08-08

## 2017-08-08 RX ORDER — INSULIN GLARGINE 100 [IU]/ML
26 INJECTION, SOLUTION SUBCUTANEOUS AT BEDTIME
Qty: 0 | Refills: 0 | Status: DISCONTINUED | OUTPATIENT
Start: 2017-08-08 | End: 2017-08-10

## 2017-08-08 RX ORDER — INSULIN GLARGINE 100 [IU]/ML
13 INJECTION, SOLUTION SUBCUTANEOUS ONCE
Qty: 0 | Refills: 0 | Status: COMPLETED | OUTPATIENT
Start: 2017-08-08 | End: 2017-08-08

## 2017-08-08 RX ADMIN — Medication 10 UNIT(S): at 17:33

## 2017-08-08 RX ADMIN — INSULIN GLARGINE 13 UNIT(S): 100 INJECTION, SOLUTION SUBCUTANEOUS at 22:15

## 2017-08-08 RX ADMIN — CLOPIDOGREL BISULFATE 75 MILLIGRAM(S): 75 TABLET, FILM COATED ORAL at 11:21

## 2017-08-08 RX ADMIN — GABAPENTIN 600 MILLIGRAM(S): 400 CAPSULE ORAL at 17:32

## 2017-08-08 RX ADMIN — PANTOPRAZOLE SODIUM 40 MILLIGRAM(S): 20 TABLET, DELAYED RELEASE ORAL at 05:24

## 2017-08-08 RX ADMIN — Medication 10 MILLIGRAM(S): at 22:15

## 2017-08-08 RX ADMIN — Medication 3: at 08:21

## 2017-08-08 RX ADMIN — Medication 81 MILLIGRAM(S): at 11:21

## 2017-08-08 RX ADMIN — LISINOPRIL 2.5 MILLIGRAM(S): 2.5 TABLET ORAL at 05:24

## 2017-08-08 RX ADMIN — Medication 6: at 11:23

## 2017-08-08 RX ADMIN — Medication 3 MILLILITER(S): at 13:38

## 2017-08-08 RX ADMIN — Medication 10 MILLIGRAM(S): at 05:24

## 2017-08-08 RX ADMIN — QUETIAPINE FUMARATE 50 MILLIGRAM(S): 200 TABLET, FILM COATED ORAL at 17:32

## 2017-08-08 RX ADMIN — HEPARIN SODIUM 5000 UNIT(S): 5000 INJECTION INTRAVENOUS; SUBCUTANEOUS at 17:33

## 2017-08-08 RX ADMIN — Medication 6: at 17:32

## 2017-08-08 RX ADMIN — AZITHROMYCIN 255 MILLIGRAM(S): 500 TABLET, FILM COATED ORAL at 11:22

## 2017-08-08 RX ADMIN — Medication 100 MILLIGRAM(S): at 22:15

## 2017-08-08 RX ADMIN — Medication 40 MILLIGRAM(S): at 05:23

## 2017-08-08 RX ADMIN — ATORVASTATIN CALCIUM 40 MILLIGRAM(S): 80 TABLET, FILM COATED ORAL at 22:15

## 2017-08-08 RX ADMIN — GABAPENTIN 600 MILLIGRAM(S): 400 CAPSULE ORAL at 05:24

## 2017-08-08 RX ADMIN — Medication 3 MILLILITER(S): at 08:26

## 2017-08-08 RX ADMIN — Medication 3 MILLILITER(S): at 01:33

## 2017-08-08 RX ADMIN — Medication 10 MILLIGRAM(S): at 14:00

## 2017-08-08 RX ADMIN — OXYCODONE AND ACETAMINOPHEN 1 TABLET(S): 5; 325 TABLET ORAL at 13:59

## 2017-08-08 RX ADMIN — Medication 3 MILLILITER(S): at 19:40

## 2017-08-08 RX ADMIN — HEPARIN SODIUM 5000 UNIT(S): 5000 INJECTION INTRAVENOUS; SUBCUTANEOUS at 05:24

## 2017-08-08 RX ADMIN — GABAPENTIN 600 MILLIGRAM(S): 400 CAPSULE ORAL at 11:22

## 2017-08-08 RX ADMIN — QUETIAPINE FUMARATE 50 MILLIGRAM(S): 200 TABLET, FILM COATED ORAL at 05:23

## 2017-08-08 RX ADMIN — FAMOTIDINE 20 MILLIGRAM(S): 10 INJECTION INTRAVENOUS at 11:21

## 2017-08-08 RX ADMIN — GABAPENTIN 600 MILLIGRAM(S): 400 CAPSULE ORAL at 23:58

## 2017-08-08 RX ADMIN — Medication 100 MILLIGRAM(S): at 11:30

## 2017-08-08 RX ADMIN — OXYCODONE AND ACETAMINOPHEN 1 TABLET(S): 5; 325 TABLET ORAL at 14:29

## 2017-08-08 RX ADMIN — HUMAN INSULIN 10 UNIT(S): 100 INJECTION, SUSPENSION SUBCUTANEOUS at 15:13

## 2017-08-08 NOTE — PROGRESS NOTE ADULT - SUBJECTIVE AND OBJECTIVE BOX
CC: coughing, CP    HPI: Pt is a 56 y/o woman with pmhx COPD/emphysema, chronic cough,  lung cancer, DM, MI, CABG,  presenting to the  ED due to SOB and CP X1 day. Patient is homeless, resides in the shelter. In the ED, mildly elevated trops noted. Patient admitted to r/o ACS and was about to undergo cardiac cath on 8/4 but deferred in the setting of fever temp 102. Patient treated for viral bronchitis and given Azithro. She underwent cath on 8/7 revealing nonobstructive CAD. During hospital course patient had 1:1 for closer monitoring after episode of confusion / depressed MS (work up negative) and refuses to part with personal bag in room.     8/5/17: Seen resting, + dry cough and mild wheezing. Smoker of 2pk/day, doesn't want nicotine patch. Events overnight noted, was altered with negative workup.     8/6/17: Slight nausea this AM, minimal cough. Very upset about 1:1 "I'm NOT suicidal", refuses to put belongings in safe. No CP.    8/717: NO CP or SOB. Ambulatory. Does not need 1:1. Awaiting cardiac cath today.    8/8/17: s/p cardiac cath revealing non-obstructive CAD. No CP but still on O2 and seen coughing after eating breakfast. Reports being told her esophagus is narrowed and needs to be stretched (at [place] months ago?).     ROS: neg unless stated above.   Vital Signs Last 24 Hrs  T(C): 36.6 (08 Aug 2017 04:08), Max: 37.1 (07 Aug 2017 16:57)  T(F): 97.8 (08 Aug 2017 04:08), Max: 98.8 (07 Aug 2017 16:57)  HR: 76 (08 Aug 2017 04:08) (73 - 90)  BP: 133/55 (08 Aug 2017 04:08) (113/91 - 174/69)  BP(mean): --  RR: 18 (08 Aug 2017 04:08) (16 - 18)  SpO2: 100% (08 Aug 2017 04:08) (92% - 100%)    PHYSICAL EXAM:  Constitutional: NAD, awake and alert, well-developed middle aged female.   HEENT: PERR, EOMI, Normal Hearing, MMM  Neck: Soft and supple, No LAD, No JVD  Respiratory: Breath sounds audible with mild end expiratory wheezing - improved. No rhonchi.   Cardiovascular: S1 and S2, regular rate and rhythm, no Murmurs, gallops or rubs  Gastrointestinal: Bowel Sounds present, soft, nontender, nondistended, no guarding, no rebound  Extremities: No peripheral edema  Vascular: 2+ peripheral pulses  Neurological: A/O x 3, no focal deficits  Musculoskeletal: 5/5 strength b/l upper and lower extremities  Skin: No rashes    MEDICATIONS  (STANDING):  heparin  Injectable 5000 Unit(s) SubCutaneous every 12 hours  lisinopril 2.5 milliGRAM(s) Oral daily  gabapentin 600 milliGRAM(s) Oral four times a day  atorvastatin 40 milliGRAM(s) Oral at bedtime  QUEtiapine 50 milliGRAM(s) Oral two times a day  famotidine    Tablet 20 milliGRAM(s) Oral daily  pantoprazole    Tablet 40 milliGRAM(s) Oral before breakfast  dextrose 5%. 1000 milliLiter(s) (50 mL/Hr) IV Continuous <Continuous>  dextrose 50% Injectable 12.5 Gram(s) IV Push once  dextrose 50% Injectable 25 Gram(s) IV Push once  dextrose 50% Injectable 25 Gram(s) IV Push once  oxybutynin 10 milliGRAM(s) Oral three times a day  ALBUTerol/ipratropium for Nebulization 3 milliLiter(s) Nebulizer every 6 hours  predniSONE   Tablet 40 milliGRAM(s) Oral daily  insulin glargine Injectable (LANTUS) 20 Unit(s) SubCutaneous at bedtime  clopidogrel Tablet 75 milliGRAM(s) Oral daily  aspirin enteric coated 81 milliGRAM(s) Oral daily  azithromycin  IVPB 500 milliGRAM(s) IV Intermittent every 24 hours  azithromycin  IVPB   IV Intermittent   insulin lispro (HumaLOG) corrective regimen sliding scale   SubCutaneous three times a day before meals  sodium chloride 0.9%. 1000 milliLiter(s) (100 mL/Hr) IV Continuous <Continuous>      LABS: All Labs Reviewed:                        11.7   8.8   )-----------( 241      ( 04 Aug 2017 20:51 )             35.3     08-05    141  |  109<H>  |  33<H>  ----------------------------<  210<H>  5.0   |  28  |  1.13    Ca    8.3<L>      05 Aug 2017 07:48    TPro  5.9<L>  /  Alb  2.8<L>  /  TBili  0.3  /  DBili  x   /  AST  25  /  ALT  18  /  AlkPhos  75  08-03    PT/INR - ( 04 Aug 2017 01:06 )   PT: 11.5 sec;   INR: 1.06 ratio       Hemoglobin A1C, Whole Blood: 9.6: Method: Immunoassay      PTT - ( 04 Aug 2017 01:06 )  PTT:31.3 sec  CARDIAC MARKERS ( 04 Aug 2017 06:08 )  0.055 ng/mL / x     / x     / x     / x      CARDIAC MARKERS ( 04 Aug 2017 02:45 )  0.073 ng/mL / x     / x     / x     / x      CARDIAC MARKERS ( 03 Aug 2017 18:50 )  0.078 ng/mL / x     / x     / x     / x          Blood Culture: neg  Neg RVP.   < from: CT Brain Stroke Protocol (08.04.17 @ 22:04) >    No CT evidence of acute intracranial hemorrhage, brain edema, mass effect   or acute territorial infarct.  If neurologic symptoms persist follow-up   MRI is recommended for further characterization.     S/P LHC & peripheral angio :  non-obstructive CAD, (+) Rt pop. A. & Lt SFA occlusion with collaterals. recommend medical Mx.

## 2017-08-08 NOTE — SWALLOW BEDSIDE ASSESSMENT ADULT - NS SPL SWALLOW CLINIC TRIAL FT
Odynophagia was denied. However, pt c/o post prandial heartburn as well as feeling that foods are "sticking" in substernal region. Favor the latter complaints of dyspepsia being UGI/esophageal related and not due to an observable oropharyngeal swallowing pathology. Odynophagia was denied. However, pt c/o post prandial heartburn as well as feeling that foods are periodically  "sticking" in substernal region. Favor the latter complaints of dyspepsia being UGI/esophageal related and not due to an observable oropharyngeal swallowing pathology.

## 2017-08-08 NOTE — SWALLOW BEDSIDE ASSESSMENT ADULT - SWALLOW EVAL: DIAGNOSIS
1) Pt demonstrates Oropharyngeal Swallowing which subjectively appears to be stable and within functional parameters for age. No behavioral aspiration signs exhibited. Odynophagia was denied. However, this profile is superimposed upon periodic post prandial c/o dyspepsia(i.e "heartburn"; feeling that food is "sticking" in substernal region). Favor these complaints being UGI/esophageal related and not due to an observable oropharyngeal swallowing pathology(i.e ? GERD, etc).  2) Pt is somewhat anxious but interactive and able to verbalize during communicative probes/Q-A dyads. When verbalizing, her speech output was somewhat rushed which appeared mood related, but her motor speech abilities appeared to be within functional parameters and her speech output was intelligible. Moreover, her utterances were linguistically intact and no evidence of Aphasia was noted during probes. She is likely at communicative baseline.

## 2017-08-08 NOTE — SWALLOW BEDSIDE ASSESSMENT ADULT - ADDITIONAL RECOMMENDATIONS
1) Pt should wear her maxillary denture for meals.     2) Medical f/u for dyspepsia complaints(i.e 'heartburn"; feeling that food is "sticking" in substernal region) at discretion of hospitalist. favor complaints being UGI/esophageal related(i.e ? GERD) and not due to an observable oropharyngeal swallowing problem. 1) Pt should wear her maxillary denture for meals.     2) Medical f/u for dyspepsia complaints(i.e 'heartburn"; feeling that food is "sticking" in substernal region) at discretion of hospitalist. Favor complaints being UGI/esophageal related(i.e ? GERD) and not due to an observable oropharyngeal swallowing problem.

## 2017-08-08 NOTE — SWALLOW BEDSIDE ASSESSMENT ADULT - COMMENTS
Pt is a 57 year old female admitted to  with SOB as well as CP. Her hosp course is notable for fever, hypoxia, and COPD exacerbation with bronchitis. This profile is superimposed upon a prior history of CAD s/p CABG-MI, COPD/Emphysema, tobacco use, and lung cancer NOS. Note that pt is homeless.

## 2017-08-08 NOTE — SWALLOW BEDSIDE ASSESSMENT ADULT - SWALLOW EVAL: CRITERIA FOR SKILLED INTERVENTION MET
Acute speech path intervention is not clinically warranted and would not . No overt primary speech-language pathology or oropharyngeal dysphagic process was evident. , Acute speech path intervention is not clinically warranted and would not . No overt primary speech-language pathology or oropharyngeal dysphagic process were evident.  Thus, will NOT actively follow. Reconsult PRN

## 2017-08-08 NOTE — SWALLOW BEDSIDE ASSESSMENT ADULT - SWALLOW EVAL: RECOMMENDED DIET
Suggest a regular texture diet with thin liquid consistencies as pt appears to tolerate same from an oropharyngeal swallowing stance.

## 2017-08-08 NOTE — PROGRESS NOTE ADULT - ASSESSMENT
Pt is a 56 y/o woman with pmhx COPD/emphysema, chronic cough,  lung cancer, DM, MI, CABG,  presenting to the  ED due to SOB and CP:    I. ACS, chest pain, pos troponin  - mostly w/ CP while coughing suggestive of musculoskeletal component.   - trops peaked and downtrended 0.05. Stable EKG and telemetry.   - s/p cardiac cath on 8/7 --> nonobstructive CAD, (+) Rt pop. A. & Lt SFA occlusion with collaterals. recommend medical Mx.  - cont Asp / added Plavix / Statin (new med)/ Lisinopril.     # Fever, Acute Hypoxic Respiratory distress  # Mild COPD exac w/ bronchitis  - suspect viral illness, no clear infiltrate, neg RVP. Longstanding smoker.   - cont nebs, pred daily, azithro day #5.   -check ambulatory pulse ox.     # History of Lung Cancer - has appt w/ Oncologist at [place] on Aug 10th. Outpatient follow up.    ? Dysphagia r/o stricture ect. --> Consult speech and swallow. Likely needs Barium swallow study.       # Acute Encephalopathy - RESOLVED. unclear etiology, normal electrolytes, normal TSH. ABG w/o evidence of CO2 retention.   - negative CTA Head for stroke protocol.   - now at baseline mentation. Patient w/ suspicious behavior, large bag in room she refuses to part with.   - will DC 1:1 today. Patient denies suicidal ideation.     II.  DMII - uncontrolled, elevated A1c 9.6%  - Insulin sliding scale, Lantus at night.     III. GERD  - cont protonix, pepcid    IV.  Homeless ness, Meds, Smoking cessation  - nicotine patch  - social work consult    # DVT prophylaxis- heparin    Dispo: remain on telemetry, consult speech. DC planning thereafter in 1-2 days.  Total time > 40 mins.

## 2017-08-08 NOTE — SWALLOW BEDSIDE ASSESSMENT ADULT - ORAL PHASE
Bolus formation/transfer were mechanically functional for age without an overt oral motor focality. She cleared oral debris after piecemeal deglutition. Bolus formation/transfer were [id number] functional for age without an overt oral motor focality. She cleared oral debris on own after piecemeal deglutition.

## 2017-08-08 NOTE — SWALLOW BEDSIDE ASSESSMENT ADULT - SLP GENERAL OBSERVATIONS
Pt is somewhat anxious but interactive and able to verbalize during communicative probes/Q-A dyads. When verbalizing, her speech output was somewhat rushed which appeared mood related, but her motor speech abilities appeared to be within functional parameters and her speech output was intelligible. Moreover, her utterances were linguistically intact and no evidence of Aphasia was noted during probes. She is likely at communicative baseline. Pt then asked about swallowing and she denied aspiration signs/odynophagia with meals, However, she does report a h/o often feeling post prandial heartburn/substernal discomfort with meals consistent with dyspepsia.

## 2017-08-08 NOTE — SWALLOW BEDSIDE ASSESSMENT ADULT - ORAL PREPARATORY PHASE
Pt was oriented to feeding situ, readily accepted PO and demonstrated functional labial grading on utensils.

## 2017-08-08 NOTE — SWALLOW BEDSIDE ASSESSMENT ADULT - PHARYNGEAL PHASE
Swallow triggered in an acceptable time frame for age and laryngeal lift on palpation during swallow trials was within functional parameters. No behavioral aspiration signs exhibited.

## 2017-08-09 LAB
ANION GAP SERPL CALC-SCNC: 6 MMOL/L — SIGNIFICANT CHANGE UP (ref 5–17)
BUN SERPL-MCNC: 38 MG/DL — HIGH (ref 7–23)
CALCIUM SERPL-MCNC: 9.5 MG/DL — SIGNIFICANT CHANGE UP (ref 8.5–10.1)
CHLORIDE SERPL-SCNC: 101 MMOL/L — SIGNIFICANT CHANGE UP (ref 96–108)
CO2 SERPL-SCNC: 33 MMOL/L — HIGH (ref 22–31)
CREAT SERPL-MCNC: 0.89 MG/DL — SIGNIFICANT CHANGE UP (ref 0.5–1.3)
CULTURE RESULTS: SIGNIFICANT CHANGE UP
GLUCOSE SERPL-MCNC: 149 MG/DL — HIGH (ref 70–99)
HCT VFR BLD CALC: 32 % — LOW (ref 34.5–45)
HGB BLD-MCNC: 10.7 G/DL — LOW (ref 11.5–15.5)
MCHC RBC-ENTMCNC: 28.7 PG — SIGNIFICANT CHANGE UP (ref 27–34)
MCHC RBC-ENTMCNC: 33.5 GM/DL — SIGNIFICANT CHANGE UP (ref 32–36)
MCV RBC AUTO: 85.8 FL — SIGNIFICANT CHANGE UP (ref 80–100)
PLATELET # BLD AUTO: 186 K/UL — SIGNIFICANT CHANGE UP (ref 150–400)
POTASSIUM SERPL-MCNC: 4.5 MMOL/L — SIGNIFICANT CHANGE UP (ref 3.5–5.3)
POTASSIUM SERPL-SCNC: 4.5 MMOL/L — SIGNIFICANT CHANGE UP (ref 3.5–5.3)
RBC # BLD: 3.73 M/UL — LOW (ref 3.8–5.2)
RBC # FLD: 14.1 % — SIGNIFICANT CHANGE UP (ref 10.3–14.5)
SODIUM SERPL-SCNC: 140 MMOL/L — SIGNIFICANT CHANGE UP (ref 135–145)
SPECIMEN SOURCE: SIGNIFICANT CHANGE UP
WBC # BLD: 10 K/UL — SIGNIFICANT CHANGE UP (ref 3.8–10.5)
WBC # FLD AUTO: 10 K/UL — SIGNIFICANT CHANGE UP (ref 3.8–10.5)

## 2017-08-09 RX ORDER — LANOLIN ALCOHOL/MO/W.PET/CERES
3 CREAM (GRAM) TOPICAL AT BEDTIME
Qty: 0 | Refills: 0 | Status: DISCONTINUED | OUTPATIENT
Start: 2017-08-09 | End: 2017-08-18

## 2017-08-09 RX ADMIN — Medication 3 MILLILITER(S): at 01:55

## 2017-08-09 RX ADMIN — Medication 10 MILLIGRAM(S): at 05:38

## 2017-08-09 RX ADMIN — Medication 10 MILLIGRAM(S): at 15:19

## 2017-08-09 RX ADMIN — LISINOPRIL 2.5 MILLIGRAM(S): 2.5 TABLET ORAL at 05:37

## 2017-08-09 RX ADMIN — GABAPENTIN 600 MILLIGRAM(S): 400 CAPSULE ORAL at 17:23

## 2017-08-09 RX ADMIN — HEPARIN SODIUM 5000 UNIT(S): 5000 INJECTION INTRAVENOUS; SUBCUTANEOUS at 22:49

## 2017-08-09 RX ADMIN — HEPARIN SODIUM 5000 UNIT(S): 5000 INJECTION INTRAVENOUS; SUBCUTANEOUS at 11:34

## 2017-08-09 RX ADMIN — Medication 6: at 17:24

## 2017-08-09 RX ADMIN — CLOPIDOGREL BISULFATE 75 MILLIGRAM(S): 75 TABLET, FILM COATED ORAL at 11:34

## 2017-08-09 RX ADMIN — Medication 81 MILLIGRAM(S): at 11:35

## 2017-08-09 RX ADMIN — GABAPENTIN 600 MILLIGRAM(S): 400 CAPSULE ORAL at 05:37

## 2017-08-09 RX ADMIN — Medication 3 MILLILITER(S): at 19:08

## 2017-08-09 RX ADMIN — AZITHROMYCIN 255 MILLIGRAM(S): 500 TABLET, FILM COATED ORAL at 11:38

## 2017-08-09 RX ADMIN — PANTOPRAZOLE SODIUM 40 MILLIGRAM(S): 20 TABLET, DELAYED RELEASE ORAL at 05:38

## 2017-08-09 RX ADMIN — QUETIAPINE FUMARATE 50 MILLIGRAM(S): 200 TABLET, FILM COATED ORAL at 05:37

## 2017-08-09 RX ADMIN — QUETIAPINE FUMARATE 50 MILLIGRAM(S): 200 TABLET, FILM COATED ORAL at 17:23

## 2017-08-09 RX ADMIN — Medication 3: at 11:35

## 2017-08-09 RX ADMIN — INSULIN GLARGINE 26 UNIT(S): 100 INJECTION, SOLUTION SUBCUTANEOUS at 22:50

## 2017-08-09 RX ADMIN — Medication 10 MILLIGRAM(S): at 22:50

## 2017-08-09 RX ADMIN — ATORVASTATIN CALCIUM 40 MILLIGRAM(S): 80 TABLET, FILM COATED ORAL at 22:50

## 2017-08-09 RX ADMIN — GABAPENTIN 600 MILLIGRAM(S): 400 CAPSULE ORAL at 11:37

## 2017-08-09 RX ADMIN — Medication 3 MILLIGRAM(S): at 00:23

## 2017-08-09 RX ADMIN — Medication 3 MILLILITER(S): at 11:05

## 2017-08-09 RX ADMIN — FAMOTIDINE 20 MILLIGRAM(S): 10 INJECTION INTRAVENOUS at 11:38

## 2017-08-09 RX ADMIN — Medication 40 MILLIGRAM(S): at 05:37

## 2017-08-09 NOTE — PROGRESS NOTE ADULT - SUBJECTIVE AND OBJECTIVE BOX
coughing, CP    57F.  presenting to the  ED due to SOB and CP X1 day. Patient is homeless, resides in the shelter. In the ED, mildly elevated trops noted. Patient admitted to r/o ACS and was about to undergo cardiac cath on 8/4 but deferred in the setting of fever temp 102. Patient treated for viral bronchitis and given Azithro. She underwent cath on 8/7 revealing nonobstructive CAD. During hospital course patient had 1:1 for closer monitoring after episode of confusion / depressed MS (work up negative) and refuses to part with personal bag in room.     PMHx:  COPD;  lung CA;  DM;  CAD-AMI + CABG.    08/09-trouble swallowing.  "I have a esophagus the size of a child and nothing goes down."    telemetry reviewed, NSR.    ROS:  ? weight loss (I don't have a scale);  no NV;  no abdominal pain;  no CP.      MEDICATIONS  (STANDING):  heparin  Injectable 5000 Unit(s) SubCutaneous every 12 hours  lisinopril 2.5 milliGRAM(s) Oral daily  gabapentin 600 milliGRAM(s) Oral four times a day  atorvastatin 40 milliGRAM(s) Oral at bedtime  QUEtiapine 50 milliGRAM(s) Oral two times a day  famotidine    Tablet 20 milliGRAM(s) Oral daily  pantoprazole    Tablet 40 milliGRAM(s) Oral before breakfast  dextrose 5%. 1000 milliLiter(s) (50 mL/Hr) IV Continuous <Continuous>  dextrose 50% Injectable 12.5 Gram(s) IV Push once  dextrose 50% Injectable 25 Gram(s) IV Push once  dextrose 50% Injectable 25 Gram(s) IV Push once  oxybutynin 10 milliGRAM(s) Oral three times a day  ALBUTerol/ipratropium for Nebulization 3 milliLiter(s) Nebulizer every 6 hours  clopidogrel Tablet 75 milliGRAM(s) Oral daily  aspirin enteric coated 81 milliGRAM(s) Oral daily  azithromycin  IVPB 500 milliGRAM(s) IV Intermittent every 24 hours  azithromycin  IVPB   IV Intermittent   insulin lispro (HumaLOG) corrective regimen sliding scale   SubCutaneous three times a day before meals  sodium chloride 0.9%. 1000 milliLiter(s) (100 mL/Hr) IV Continuous <Continuous>  insulin glargine Injectable (LANTUS) 26 Unit(s) SubCutaneous at bedtime    MEDICATIONS  (PRN):  acetaminophen   Tablet. 650 milliGRAM(s) Oral every 6 hours PRN Mild Pain (1 - 3)  ALBUTerol    90 MICROgram(s) HFA Inhaler 2 Puff(s) Inhalation every 6 hours PRN Shortness of Breath and/or Wheezing  guaiFENesin    Syrup 100 milliGRAM(s) Oral every 6 hours PRN Cough  dextrose Gel 1 Dose(s) Oral once PRN Blood Glucose LESS THAN 70 milliGRAM(s)/deciLiter  glucagon  Injectable 1 milliGRAM(s) IntraMuscular once PRN Glucose <70 milliGRAM(s)/deciLiter  oxyCODONE    5 mG/acetaminophen 325 mG 1 Tablet(s) Oral every 4 hours PRN Severe Pain (7 - 10)  promethazine Syrup 5 milliGRAM(s) Oral every 6 hours PRN Cough  ondansetron Injectable 4 milliGRAM(s) IV Push every 4 hours PRN Nausea and/or Vomiting  melatonin 3 milliGRAM(s) Oral at bedtime PRN Insomnia    Vital Signs Last 24 Hrs  T(C): 37 (09 Aug 2017 09:54), Max: 37 (08 Aug 2017 16:14)  T(F): 98.6 (09 Aug 2017 09:54), Max: 98.6 (08 Aug 2017 16:14)  HR: 72 (09 Aug 2017 11:06) (72 - 78)  BP: 151/52 (09 Aug 2017 09:54) (140/62 - 155/76)  BP(mean): --  RR: 18 (09 Aug 2017 09:54) (17 - 18)  SpO2: 97% (09 Aug 2017 09:54) (96% - 97%)                        10.7   10.0  )-----------( 186      ( 09 Aug 2017 06:35 )             32.0       08-09    140  |  101  |  38<H>  ----------------------------<  149<H>  4.5   |  33<H>  |  0.89    Ca    9.5      09 Aug 2017 06:35    Blood Culture: neg  Neg RVP.   < from: CT Brain Stroke Protocol (08.04.17 @ 22:04) >    No CT evidence of acute intracranial hemorrhage, brain edema, mass effect   or acute territorial infarct.  If neurologic symptoms persist follow-up   MRI is recommended for further characterization.     S/P LHC & peripheral angio :  non-obstructive CAD, (+) Rt pop. A. & Lt SFA occlusion with collaterals. recommend medical Mx.    NQWMI (type 2).  hx CAD-AMI + CABG.  -chest pain resolved.  -08/07 coronary angiogram nonobstructive CAD.  -c/w medical management.  -transfer to [facility].    dysphagia.  hx GERD.  -questionable hx of esophageal stricture dx'd at [facility].  -difficulty eating.  -speech pathology input reviewed.  -PPI.  -GI consult, re:  EGD.    suspect viral bronchitis.  hx COPD.  -resolving.  reports breathing has improved.  -azithromycin day 4 of 5.  -DUO neb q6h + JOANN HFA q6h PRN.  -guaifenesin.    hx type 2 DM.  -uncontrolled.  -A1C 9.6%.  -c/w correction insulin coverage + Lantus.    disposition.  -d/c telemetry.  -transfer to [facility].  -patient is undomiciled.  case managment, re:  placement.

## 2017-08-10 RX ORDER — INSULIN LISPRO 100/ML
2 VIAL (ML) SUBCUTANEOUS
Qty: 0 | Refills: 0 | Status: DISCONTINUED | OUTPATIENT
Start: 2017-08-10 | End: 2017-08-12

## 2017-08-10 RX ORDER — INSULIN GLARGINE 100 [IU]/ML
40 INJECTION, SOLUTION SUBCUTANEOUS
Qty: 0 | Refills: 0 | Status: DISCONTINUED | OUTPATIENT
Start: 2017-08-10 | End: 2017-08-13

## 2017-08-10 RX ADMIN — Medication 3 MILLILITER(S): at 11:09

## 2017-08-10 RX ADMIN — GABAPENTIN 600 MILLIGRAM(S): 400 CAPSULE ORAL at 12:21

## 2017-08-10 RX ADMIN — QUETIAPINE FUMARATE 50 MILLIGRAM(S): 200 TABLET, FILM COATED ORAL at 06:25

## 2017-08-10 RX ADMIN — INSULIN GLARGINE 28 UNIT(S): 100 INJECTION, SOLUTION SUBCUTANEOUS at 23:46

## 2017-08-10 RX ADMIN — Medication 3 MILLILITER(S): at 01:51

## 2017-08-10 RX ADMIN — GABAPENTIN 600 MILLIGRAM(S): 400 CAPSULE ORAL at 06:25

## 2017-08-10 RX ADMIN — Medication 100 MILLIGRAM(S): at 00:28

## 2017-08-10 RX ADMIN — Medication 10 MILLIGRAM(S): at 19:04

## 2017-08-10 RX ADMIN — Medication 3 MILLILITER(S): at 19:08

## 2017-08-10 RX ADMIN — Medication 1: at 09:03

## 2017-08-10 RX ADMIN — HEPARIN SODIUM 5000 UNIT(S): 5000 INJECTION INTRAVENOUS; SUBCUTANEOUS at 06:25

## 2017-08-10 RX ADMIN — Medication 3: at 19:05

## 2017-08-10 RX ADMIN — HEPARIN SODIUM 5000 UNIT(S): 5000 INJECTION INTRAVENOUS; SUBCUTANEOUS at 19:05

## 2017-08-10 RX ADMIN — GABAPENTIN 600 MILLIGRAM(S): 400 CAPSULE ORAL at 23:46

## 2017-08-10 RX ADMIN — Medication 10 MILLIGRAM(S): at 23:46

## 2017-08-10 RX ADMIN — ATORVASTATIN CALCIUM 40 MILLIGRAM(S): 80 TABLET, FILM COATED ORAL at 23:46

## 2017-08-10 RX ADMIN — Medication 81 MILLIGRAM(S): at 12:21

## 2017-08-10 RX ADMIN — PANTOPRAZOLE SODIUM 40 MILLIGRAM(S): 20 TABLET, DELAYED RELEASE ORAL at 06:25

## 2017-08-10 RX ADMIN — CLOPIDOGREL BISULFATE 75 MILLIGRAM(S): 75 TABLET, FILM COATED ORAL at 12:21

## 2017-08-10 RX ADMIN — LISINOPRIL 2.5 MILLIGRAM(S): 2.5 TABLET ORAL at 06:25

## 2017-08-10 RX ADMIN — Medication 2 UNIT(S): at 19:06

## 2017-08-10 RX ADMIN — QUETIAPINE FUMARATE 50 MILLIGRAM(S): 200 TABLET, FILM COATED ORAL at 19:04

## 2017-08-10 RX ADMIN — GABAPENTIN 600 MILLIGRAM(S): 400 CAPSULE ORAL at 00:27

## 2017-08-10 RX ADMIN — Medication 10 MILLIGRAM(S): at 06:25

## 2017-08-10 RX ADMIN — Medication: at 12:50

## 2017-08-10 RX ADMIN — FAMOTIDINE 20 MILLIGRAM(S): 10 INJECTION INTRAVENOUS at 12:21

## 2017-08-10 RX ADMIN — GABAPENTIN 600 MILLIGRAM(S): 400 CAPSULE ORAL at 19:05

## 2017-08-10 NOTE — CONSULT NOTE ADULT - SUBJECTIVE AND OBJECTIVE BOX
Patient is a 57y old  Female who presents with a chief complaint of chest pain (04 Aug 2017 02:33)      HPI:  Pt is a 58 y/o woman with pmhx COPD/emphysema, chronic cough,  lung cancer, DM, MI, CABG,  presenting to the  ED due to SOB and CP since yesterday.   She reports chest pain is non radiating and 9/10.   She states it  feels like "someone is sitting on her chest".  Pt reports resting and lying still, helps the pain.   She c/o nausea and  mild abd pain for which she reports pepcid usually helps.    In the ER, she received Duonebs, I L IVF. Also hycodan was given for a coughing fit.   She received ASA.  Troponin was positive .        Patient complains of chronic reflux. The reflux is been going on for a while and increases after large meals. She is also noted dysphagia to solids. She has some mild odynophagia with it as well. She tries to push it down with water or liquids but that doesn't help much. Eventually, he passes.  She denies any chest pain or shortness of breath at this time.  She's had these symptoms for a while.    She also has a history of nicotine exposure as well as COPD. However, she denies any recent use of steroids.  She notes that her reflux that worsened after eating large meals.          Past Med/Surg Hx   COPD,   Emphysema,   Lung cancer,   DM,   CABG three vessel, MI    Fam Hx:  non-contrib to current adm (04 Aug 2017 02:33)      PAST MEDICAL & SURGICAL HISTORY:  Lung cancer  COPD (chronic obstructive pulmonary disease)  No significant past surgical history      MEDICATIONS  (STANDING):  heparin  Injectable 5000 Unit(s) SubCutaneous every 12 hours  lisinopril 2.5 milliGRAM(s) Oral daily  gabapentin 600 milliGRAM(s) Oral four times a day  atorvastatin 40 milliGRAM(s) Oral at bedtime  QUEtiapine 50 milliGRAM(s) Oral two times a day  famotidine    Tablet 20 milliGRAM(s) Oral daily  pantoprazole    Tablet 40 milliGRAM(s) Oral before breakfast  dextrose 5%. 1000 milliLiter(s) (50 mL/Hr) IV Continuous <Continuous>  dextrose 50% Injectable 12.5 Gram(s) IV Push once  dextrose 50% Injectable 25 Gram(s) IV Push once  dextrose 50% Injectable 25 Gram(s) IV Push once  oxybutynin 10 milliGRAM(s) Oral three times a day  ALBUTerol/ipratropium for Nebulization 3 milliLiter(s) Nebulizer every 6 hours  clopidogrel Tablet 75 milliGRAM(s) Oral daily  aspirin enteric coated 81 milliGRAM(s) Oral daily  azithromycin  IVPB 500 milliGRAM(s) IV Intermittent every 24 hours  azithromycin  IVPB   IV Intermittent   insulin lispro (HumaLOG) corrective regimen sliding scale   SubCutaneous three times a day before meals  sodium chloride 0.9%. 1000 milliLiter(s) (100 mL/Hr) IV Continuous <Continuous>  insulin glargine Injectable (LANTUS) 26 Unit(s) SubCutaneous at bedtime    MEDICATIONS  (PRN):  acetaminophen   Tablet. 650 milliGRAM(s) Oral every 6 hours PRN Mild Pain (1 - 3)  ALBUTerol    90 MICROgram(s) HFA Inhaler 2 Puff(s) Inhalation every 6 hours PRN Shortness of Breath and/or Wheezing  guaiFENesin    Syrup 100 milliGRAM(s) Oral every 6 hours PRN Cough  dextrose Gel 1 Dose(s) Oral once PRN Blood Glucose LESS THAN 70 milliGRAM(s)/deciLiter  glucagon  Injectable 1 milliGRAM(s) IntraMuscular once PRN Glucose <70 milliGRAM(s)/deciLiter  oxyCODONE    5 mG/acetaminophen 325 mG 1 Tablet(s) Oral every 4 hours PRN Severe Pain (7 - 10)  promethazine Syrup 5 milliGRAM(s) Oral every 6 hours PRN Cough  ondansetron Injectable 4 milliGRAM(s) IV Push every 4 hours PRN Nausea and/or Vomiting  melatonin 3 milliGRAM(s) Oral at bedtime PRN Insomnia      Allergies    Dilaudid (Unknown)    Intolerances        SOCIAL HISTORY:Homeless and lives in shelter    FAMILY HISTORY:  No pertinent family history in first degree relatives      REVIEW OF SYSTEMS:    CONSTITUTIONAL: No weakness, fevers or chills  EYES/ENT: No visual changes;  No vertigo or throat pain   NECK: No pain or stiffness  RESPIRATORY: pos cough, wheezing, hemoptysis; No shortness of breath  CARDIOVASCULAR: No chest pain or palpitations  GENITOURINARY: No dysuria, frequency or hematuria  NEUROLOGICAL: No numbness or weakness  SKIN: No itching, burning, rashes, or lesions   All other review of systems is negative unless indicated above.    Vital Signs Last 24 Hrs  T(C): 37.1 (10 Aug 2017 11:15), Max: 37.1 (10 Aug 2017 11:15)  T(F): 98.7 (10 Aug 2017 11:15), Max: 98.7 (10 Aug 2017 11:15)  HR: 81 (10 Aug 2017 11:15) (71 - 82)  BP: 11/50 (10 Aug 2017 11:15) (11/50 - 153/55)  BP(mean): --  RR: 18 (10 Aug 2017 11:15) (18 - 18)  SpO2: 96% (10 Aug 2017 05:39) (94% - 96%)    PHYSICAL EXAM:on O2    Constitutional: NAD, well-developed  HEENT: EOMI, throat clear  Neck: No LAD, supple  Respiratory: CTA and P  Cardiovascular: S1 and S2, RRR, no M  Gastrointestinal: BS+, soft, mild epig tend/ND, neg HSM,  Extremities: No peripheral edema, neg clubing, cyanosis  Vascular: 2+ peripheral pulses  Neurological: A/O x 3, no focal deficits  Psychiatric: Normal mood, normal affect  Skin: No rashes    LABS:  CBC Full  -  ( 09 Aug 2017 06:35 )  WBC Count : 10.0 K/uL  Hemoglobin : 10.7 g/dL  Hematocrit : 32.0 %  Platelet Count - Automated : 186 K/uL  Mean Cell Volume : 85.8 fl  Mean Cell Hemoglobin : 28.7 pg  Mean Cell Hemoglobin Concentration : 33.5 gm/dL  Auto Neutrophil # : x  Auto Lymphocyte # : x  Auto Monocyte # : x  Auto Eosinophil # : x  Auto Basophil # : x  Auto Neutrophil % : x  Auto Lymphocyte % : x  Auto Monocyte % : x  Auto Eosinophil % : x  Auto Basophil % : x    08-09    140  |  101  |  38<H>  ----------------------------<  149<H>  4.5   |  33<H>  |  0.89    Ca    9.5      09 Aug 2017 06:35              RADIOLOGY & ADDITIONAL STUDIES:  < from: Xray Chest 1 View AP/PA. (08.03.17 @ 19:09) >  EXAM:  CHEST SINGLE VIEW FRONTAL                            PROCEDURE DATE:  08/03/2017          INTERPRETATION:  Exam Date: 8/3/2017 7:09 PM    Chest radiograph (one view)    CLINICAL INFORMATION:  sob    TECHNIQUE:  Single frontal view of the chest was obtained.    COMPARISON: Ember 15, 2017    FINDINGS/  IMPRESSION:      The lungs are clear.  No pleural abnormality is seen.      Status post median sternotomy and prior cardiac surgery.  Cardiomegaly   present.                < end of copied text >

## 2017-08-10 NOTE — DIETITIAN INITIAL EVALUATION ADULT. - FACTORS AFF FOOD INTAKE
difficulty swallowing/s/p SLP recommend regular consistency and thin liquids. Pt tolerating soft diet well.

## 2017-08-10 NOTE — PROGRESS NOTE ADULT - SUBJECTIVE AND OBJECTIVE BOX
coughing, CP    57F.  presenting to the  ED due to SOB and CP X1 day. Patient is homeless, resides in the shelter. In the ED, mildly elevated trops noted. Patient admitted to r/o ACS and was about to undergo cardiac cath on 8/4 but deferred in the setting of fever temp 102. Patient treated for viral bronchitis and given Azithro. She underwent cath on 8/7 revealing nonobstructive CAD. During hospital course patient had 1:1 for closer monitoring after episode of confusion / depressed MS (work up negative) and refuses to part with personal bag in room.     PMHx:  COPD;  lung CA;  DM;  CAD-AMI + CABG.    08/09-trouble swallowing.  "I have a esophagus the size of a child and nothing goes down."    telemetry reviewed, NSR.    08/10-resting comfortably.  offers no new complaints.    ROS:  ? weight loss (I don't have a scale);  no NV;  no abdominal pain;  no CP.    MEDICATIONS  (STANDING):  heparin  Injectable 5000 Unit(s) SubCutaneous every 12 hours  lisinopril 2.5 milliGRAM(s) Oral daily  gabapentin 600 milliGRAM(s) Oral four times a day  atorvastatin 40 milliGRAM(s) Oral at bedtime  QUEtiapine 50 milliGRAM(s) Oral two times a day  famotidine    Tablet 20 milliGRAM(s) Oral daily  pantoprazole    Tablet 40 milliGRAM(s) Oral before breakfast  dextrose 5%. 1000 milliLiter(s) (50 mL/Hr) IV Continuous <Continuous>  dextrose 50% Injectable 12.5 Gram(s) IV Push once  dextrose 50% Injectable 25 Gram(s) IV Push once  dextrose 50% Injectable 25 Gram(s) IV Push once  oxybutynin 10 milliGRAM(s) Oral three times a day  ALBUTerol/ipratropium for Nebulization 3 milliLiter(s) Nebulizer every 6 hours  clopidogrel Tablet 75 milliGRAM(s) Oral daily  aspirin enteric coated 81 milliGRAM(s) Oral daily  insulin lispro (HumaLOG) corrective regimen sliding scale   SubCutaneous three times a day before meals  sodium chloride 0.9%. 1000 milliLiter(s) (100 mL/Hr) IV Continuous <Continuous>  insulin glargine Injectable (LANTUS) 26 Unit(s) SubCutaneous at bedtime    MEDICATIONS  (PRN):  acetaminophen   Tablet. 650 milliGRAM(s) Oral every 6 hours PRN Mild Pain (1 - 3)  ALBUTerol    90 MICROgram(s) HFA Inhaler 2 Puff(s) Inhalation every 6 hours PRN Shortness of Breath and/or Wheezing  guaiFENesin    Syrup 100 milliGRAM(s) Oral every 6 hours PRN Cough  dextrose Gel 1 Dose(s) Oral once PRN Blood Glucose LESS THAN 70 milliGRAM(s)/deciLiter  glucagon  Injectable 1 milliGRAM(s) IntraMuscular once PRN Glucose <70 milliGRAM(s)/deciLiter  oxyCODONE    5 mG/acetaminophen 325 mG 1 Tablet(s) Oral every 4 hours PRN Severe Pain (7 - 10)  promethazine Syrup 5 milliGRAM(s) Oral every 6 hours PRN Cough  ondansetron Injectable 4 milliGRAM(s) IV Push every 4 hours PRN Nausea and/or Vomiting  melatonin 3 milliGRAM(s) Oral at bedtime PRN Insomnia    Vital Signs Last 24 Hrs  T(C): 37.1 (10 Aug 2017 11:15), Max: 37.1 (10 Aug 2017 11:15)  T(F): 98.7 (10 Aug 2017 11:15), Max: 98.7 (10 Aug 2017 11:15)  HR: 81 (10 Aug 2017 11:15) (71 - 82)  BP: 11/50 (10 Aug 2017 11:15) (11/50 - 153/55)  BP(mean): --  RR: 18 (10 Aug 2017 11:15) (18 - 18)  SpO2: 96% (10 Aug 2017 05:39) (94% - 96%)                        10.7   10.0  )-----------( 186      ( 09 Aug 2017 06:35 )             32.0       08-09    140  |  101  |  38<H>  ----------------------------<  149<H>  4.5   |  33<H>  |  0.89    Ca    9.5      09 Aug 2017 06:35    NQWMI (type 2).  hx CAD-AMI + CABG.  -chest pain resolved.  -08/07 coronary angiogram nonobstructive CAD.  -c/w medical management.  -transfer to [facility].    dysphagia.  hx GERD.  -questionable hx of esophageal stricture dx'd at [facility].  -difficulty eating.  -speech pathology input reviewed.  -PPI.  -GI input noted.  EGD deferred.  for barium esophagram.    suspect viral bronchitis.  hx COPD.  -resolving.  reports breathing has improved.  -azithromycin day 5 of 5.  -DUO neb q6h + JOANN HFA q6h PRN.  -guaifenesin.    hx type 2 DM.  -uncontrolled.  -A1C 9.6%.  -escalate Lantus.  -escalate premeal insulin lispro.  -c/w correction coverage.    disposition.  -d/c'd telemetry.  -transfered to [facility].  -patient is undomiciled.  case managment, re:  placement.

## 2017-08-10 NOTE — DIETITIAN INITIAL EVALUATION ADULT. - ETIOLOGY
Pt reports over consumption of simple carbs/and foods high in cholesterol/fat/sodium. Uncontrolled DM, Obesity

## 2017-08-10 NOTE — DIETITIAN INITIAL EVALUATION ADULT. - NS AS NUTRI INTERV ED CONTENT
Purpose of the nutrition education/Nutrition relationship to health/disease/DASH/TLC and Consistent carbohydrate w/ no evening snack.

## 2017-08-10 NOTE — DIETITIAN INITIAL EVALUATION ADULT. - OTHER INFO
Pt tolerating diet well w/ ~100% of meals consumed. Skin intact w/ +1 edema L/R leg. HgA1c 9.6 (uncontrolled DM). Pt consumed PTA foods high in Sodium/calories/carbohydrates/cholesterol. Denies any difficulty swallowing current soft diet and thin liquids(see SLP evaluation for results). Current diet rx w/out any restrictions suggest change to DASH/TLC and consistent carbohydrates w/ no evening snack. Diet instruction and education provided. Pt verbalized understanding of non compliance and risks.

## 2017-08-10 NOTE — CONSULT NOTE ADULT - ASSESSMENT
History of coronary artery disease as well as non-Q wave MI. Chest pain has resolved. On August 7 or coronary angiogram showed nonobstructive coronary artery disease.    Reflux as well as dysphagia, would maintain patient on proton pump inhibitors.  However, I am concern for esophageal stricture or other pathology resulting in her dysphagia. Due to her cough as well as other comorbid risk factors and 15 on oxygen at this time, would delay endoscopy.  Would maintain patient on a soft diet as well as check barium esophagram for etiology.    Bronchitis–possible viral. Appears to be a little bit improving and she is on antibiotics.  We'll await above evaluation and continue treatment for reflux.  Eventual endoscopy and risk of missed diagnoses without follow-up for endoscopy discussed with patient.  We also talked about possibility association of esophageal cancer with dysphagia and risk of missed diagnoses without follow-up for evaluation of this was discussed with her as well.    Ms. Bains and she is on insulin coverage

## 2017-08-11 ENCOUNTER — TRANSCRIPTION ENCOUNTER (OUTPATIENT)
Age: 57
End: 2017-08-11

## 2017-08-11 PROCEDURE — 74220 X-RAY XM ESOPHAGUS 1CNTRST: CPT | Mod: 26

## 2017-08-11 RX ORDER — ATORVASTATIN CALCIUM 80 MG/1
1 TABLET, FILM COATED ORAL
Qty: 0 | Refills: 0 | COMMUNITY

## 2017-08-11 RX ORDER — QUETIAPINE FUMARATE 200 MG/1
1 TABLET, FILM COATED ORAL
Qty: 0 | Refills: 0 | COMMUNITY

## 2017-08-11 RX ORDER — INSULIN LISPRO 100/ML
2 VIAL (ML) SUBCUTANEOUS
Qty: 0 | Refills: 0 | COMMUNITY
Start: 2017-08-11

## 2017-08-11 RX ORDER — ASPIRIN/CALCIUM CARB/MAGNESIUM 324 MG
1 TABLET ORAL
Qty: 0 | Refills: 0 | COMMUNITY
Start: 2017-08-11

## 2017-08-11 RX ORDER — ACETAMINOPHEN 500 MG
2 TABLET ORAL
Qty: 0 | Refills: 0 | COMMUNITY
Start: 2017-08-11

## 2017-08-11 RX ORDER — LISINOPRIL 2.5 MG/1
1 TABLET ORAL
Qty: 0 | Refills: 0 | COMMUNITY
Start: 2017-08-11

## 2017-08-11 RX ORDER — IPRATROPIUM/ALBUTEROL SULFATE 18-103MCG
3 AEROSOL WITH ADAPTER (GRAM) INHALATION
Qty: 0 | Refills: 0 | COMMUNITY
Start: 2017-08-11

## 2017-08-11 RX ORDER — INSULIN LISPRO 100/ML
0 VIAL (ML) SUBCUTANEOUS
Qty: 0 | Refills: 0 | COMMUNITY
Start: 2017-08-11

## 2017-08-11 RX ORDER — FAMOTIDINE 10 MG/ML
1 INJECTION INTRAVENOUS
Qty: 0 | Refills: 0 | COMMUNITY

## 2017-08-11 RX ORDER — PANTOPRAZOLE SODIUM 20 MG/1
1 TABLET, DELAYED RELEASE ORAL
Qty: 0 | Refills: 0 | COMMUNITY
Start: 2017-08-11

## 2017-08-11 RX ORDER — CLOPIDOGREL BISULFATE 75 MG/1
1 TABLET, FILM COATED ORAL
Qty: 0 | Refills: 0 | COMMUNITY
Start: 2017-08-11

## 2017-08-11 RX ORDER — FAMOTIDINE 10 MG/ML
1 INJECTION INTRAVENOUS
Qty: 0 | Refills: 0 | COMMUNITY
Start: 2017-08-11

## 2017-08-11 RX ORDER — ATORVASTATIN CALCIUM 80 MG/1
1 TABLET, FILM COATED ORAL
Qty: 0 | Refills: 0 | COMMUNITY
Start: 2017-08-11

## 2017-08-11 RX ORDER — PANTOPRAZOLE SODIUM 20 MG/1
1 TABLET, DELAYED RELEASE ORAL
Qty: 0 | Refills: 0 | COMMUNITY

## 2017-08-11 RX ORDER — LISINOPRIL 2.5 MG/1
1 TABLET ORAL
Qty: 0 | Refills: 0 | COMMUNITY

## 2017-08-11 RX ORDER — ALBUTEROL 90 UG/1
2 AEROSOL, METERED ORAL
Qty: 0 | Refills: 0 | COMMUNITY
Start: 2017-08-11

## 2017-08-11 RX ORDER — INSULIN GLARGINE 100 [IU]/ML
40 INJECTION, SOLUTION SUBCUTANEOUS
Qty: 0 | Refills: 0 | COMMUNITY
Start: 2017-08-11

## 2017-08-11 RX ADMIN — HEPARIN SODIUM 5000 UNIT(S): 5000 INJECTION INTRAVENOUS; SUBCUTANEOUS at 06:25

## 2017-08-11 RX ADMIN — QUETIAPINE FUMARATE 50 MILLIGRAM(S): 200 TABLET, FILM COATED ORAL at 06:20

## 2017-08-11 RX ADMIN — LISINOPRIL 2.5 MILLIGRAM(S): 2.5 TABLET ORAL at 06:20

## 2017-08-11 RX ADMIN — ATORVASTATIN CALCIUM 40 MILLIGRAM(S): 80 TABLET, FILM COATED ORAL at 22:19

## 2017-08-11 RX ADMIN — FAMOTIDINE 20 MILLIGRAM(S): 10 INJECTION INTRAVENOUS at 13:17

## 2017-08-11 RX ADMIN — Medication 3 MILLILITER(S): at 08:17

## 2017-08-11 RX ADMIN — Medication 100 MILLIGRAM(S): at 04:25

## 2017-08-11 RX ADMIN — Medication 3: at 13:13

## 2017-08-11 RX ADMIN — GABAPENTIN 600 MILLIGRAM(S): 400 CAPSULE ORAL at 13:16

## 2017-08-11 RX ADMIN — Medication 4: at 18:00

## 2017-08-11 RX ADMIN — GABAPENTIN 600 MILLIGRAM(S): 400 CAPSULE ORAL at 17:59

## 2017-08-11 RX ADMIN — GABAPENTIN 600 MILLIGRAM(S): 400 CAPSULE ORAL at 06:21

## 2017-08-11 RX ADMIN — Medication 3 MILLILITER(S): at 02:23

## 2017-08-11 RX ADMIN — Medication 2 UNIT(S): at 13:14

## 2017-08-11 RX ADMIN — Medication 3 MILLILITER(S): at 20:31

## 2017-08-11 RX ADMIN — Medication 81 MILLIGRAM(S): at 13:15

## 2017-08-11 RX ADMIN — Medication 10 MILLIGRAM(S): at 22:19

## 2017-08-11 RX ADMIN — Medication 10 MILLIGRAM(S): at 06:20

## 2017-08-11 RX ADMIN — HEPARIN SODIUM 5000 UNIT(S): 5000 INJECTION INTRAVENOUS; SUBCUTANEOUS at 17:59

## 2017-08-11 RX ADMIN — INSULIN GLARGINE 40 UNIT(S): 100 INJECTION, SOLUTION SUBCUTANEOUS at 22:20

## 2017-08-11 RX ADMIN — PANTOPRAZOLE SODIUM 40 MILLIGRAM(S): 20 TABLET, DELAYED RELEASE ORAL at 06:20

## 2017-08-11 RX ADMIN — Medication 2 UNIT(S): at 18:01

## 2017-08-11 RX ADMIN — Medication 3 MILLILITER(S): at 14:01

## 2017-08-11 RX ADMIN — CLOPIDOGREL BISULFATE 75 MILLIGRAM(S): 75 TABLET, FILM COATED ORAL at 13:17

## 2017-08-11 RX ADMIN — Medication 10 MILLIGRAM(S): at 18:00

## 2017-08-11 NOTE — DISCHARGE NOTE ADULT - CARE PROVIDERS DIRECT ADDRESSES
,DirectAddress_Unknown,DirectAddress_Unknown,[email or web address],DirectAddress_Unknown,DirectAddress_Unknown

## 2017-08-11 NOTE — DISCHARGE NOTE ADULT - SECONDARY DIAGNOSIS.
COPD exacerbation Hyperlipidemia, unspecified hyperlipidemia type Dysphagia, unspecified type Uncontrolled type 2 diabetes mellitus with complication, unspecified long term insulin use status

## 2017-08-11 NOTE — DISCHARGE NOTE ADULT - CARE PROVIDER_API CALL
Heladio Hendrickson), Gastroenterology  [address] [place] [address]  Phone: [phone]  Fax: [phone]    Dr Katrin Alcazar at [place],   after discharge from [facility]  Phone: (   )    -  Fax: (   )    -    Dior Felipe (MD), Cardiovascular Disease; Internal Medicine; Interventional Cardiology  [address] [place] [address]  Phone: [phone]  Fax: [phone] Dior Felipe (MD), Cardiovascular Disease; Internal Medicine; Interventional Cardiology  [address] [place] [address]  Phone: [phone]  Fax: [phone]    Dr Katrin Alcazar at [place],   after discharge from [facility]  Phone: (   )    -  Fax: (   )    -    Kaden Woodward), Gastroenterology; Internal Medicine  [address] [place] [address]  Phone: [phone]  Fax: [phone]

## 2017-08-11 NOTE — PHYSICAL THERAPY INITIAL EVALUATION ADULT - CRITERIA FOR SKILLED THERAPEUTIC INTERVENTIONS
Pt demonstrates baseline level of independence across all functional mobility and does not require further skilled PT intervention while at .

## 2017-08-11 NOTE — PHYSICAL THERAPY INITIAL EVALUATION ADULT - PERTINENT HX OF CURRENT PROBLEM, REHAB EVAL
57F.  presenting to the  ED due to SOB and CP X1 day. Patient is homeless, resides in the shelter. In the ED, mildly elevated trops noted. Patient admitted to r/o ACS and was about to undergo cardiac cath on 8/4 but deferred in the setting of fever temp 102. Patient treated for viral bronchitis and given Azithro. She underwent cath on 8/7 revealing nonobstructive CAD. During hospital course patient had 1:1 for closer monitoring after episode of confusion

## 2017-08-11 NOTE — DISCHARGE NOTE ADULT - PLAN OF CARE
prevent worsening f/u with cardiologist as an outpatient  contineu current medication regimen prevent further exacerbation continue nebulizer treatment  f/u with your PCP in the communitu contienu current regimen f/u with GI doctor as an outpatient   cotninue pepcid and protonix cotninue diabetic diet  continue insulin regimen with lantus and lispro f/u with GI doctor as an outpatient - f/u with Dr Trace robin and protonix

## 2017-08-11 NOTE — DISCHARGE NOTE ADULT - MEDICATION SUMMARY - MEDICATIONS TO CHANGE
I will SWITCH the dose or number of times a day I take the medications listed below when I get home from the hospital:    insulin lispro 100 units/mL subcutaneous solution  -- 2-10 units prior to each meal

## 2017-08-11 NOTE — DISCHARGE NOTE ADULT - NS AS ACTIVITY OBS
Walking-Indoors allowed/Showering allowed/Walking-Outdoors allowed/Do not make important decisions/Do not drive or operate machinery

## 2017-08-11 NOTE — DISCHARGE NOTE ADULT - MEDICATION SUMMARY - MEDICATIONS TO TAKE
I will START or STAY ON the medications listed below when I get home from the hospital:    acetaminophen 325 mg oral tablet  -- 2 tab(s) by mouth every 6 hours, As needed, Mild Pain (1 - 3)  -- Indication: For pain    aspirin 81 mg oral delayed release tablet  -- 1 tab(s) by mouth once a day  -- Indication: For CAD    lisinopril 2.5 mg oral tablet  -- 1 tab(s) by mouth once a day  -- Indication: For CAD    gabapentin 600 mg oral tablet  -- 1 tab(s) by mouth 4 times a day  -- Indication: For neuropathic pain    insulin glargine  -- 40 unit(s) subcutaneous every 12 hours  -- Indication: For Diabetes    insulin lispro 100 units/mL subcutaneous solution  --  subcutaneous 3 times a day (before meals); 1 Unit(s) if Glucose 151 - 200  2 Unit(s) if Glucose 201 - 250  3 Unit(s) if Glucose 251 - 300  4 Unit(s) if Glucose 301 - 350  5 Unit(s) if Glucose 351 - 400  6 Unit(s) if Glucose GREATER THAN 400  -- Indication: For Diabetes    insulin lispro 100 units/mL subcutaneous solution  -- 2 unit(s) subcutaneous 3 times a day (before meals)  -- Indication: For Diabetes    diphenhydrAMINE 50 mg oral capsule  -- 1 cap(s) by mouth once a day (at bedtime)  -- Indication: For COugh    atorvastatin 40 mg oral tablet  -- 1 tab(s) by mouth once a day (at bedtime)  -- Indication: For HLD    clopidogrel 75 mg oral tablet  -- 1 tab(s) by mouth once a day  -- Indication: For CAD    albuterol 90 mcg/inh inhalation aerosol  -- 2 puff(s) inhaled every 6 hours, As needed, Shortness of Breath and/or Wheezing  -- Indication: For Wheezing    albuterol-ipratropium 2.5 mg-0.5 mg/3 mL inhalation solution  -- 3 milliliter(s) inhaled every 6 hours, As Needed wheezing/shortness of breath  -- Indication: For wheezing    guaiFENesin 100 mg/5 mL oral liquid  -- 5 milliliter(s) by mouth every 6 hours, As needed, Cough  -- Indication: For COugh    famotidine 20 mg oral tablet  -- 1 tab(s) by mouth once a day  -- Indication: For GERD    pantoprazole 40 mg oral delayed release tablet  -- 1 tab(s) by mouth once a day (before a meal)  -- Indication: For GERD    oxybutynin 5 mg oral tablet  -- 1 tab(s) by mouth 4 times a day  -- Indication: For urinary retention I will START or STAY ON the medications listed below when I get home from the hospital:    acetaminophen 325 mg oral tablet  -- 2 tab(s) by mouth every 6 hours, As needed, Mild Pain (1 - 3)  -- Indication: For pain    aspirin 81 mg oral delayed release tablet  -- 1 tab(s) by mouth once a day  -- Indication: For CAD    lisinopril 2.5 mg oral tablet  -- 1 tab(s) by mouth once a day  -- Indication: For CAD    gabapentin 600 mg oral tablet  -- 1 tab(s) by mouth 4 times a day  -- Indication: For neuropathic pain    insulin lispro 100 units/mL subcutaneous solution  -- 2 unit(s) subcutaneous 3 times a day (before meals)  -- Indication: For Diabetes    insulin glargine  -- 50 unit(s) subcutaneous every 12 hours  -- Indication: For Diabetes    insulin lispro 100 units/mL subcutaneous solution  --  subcutaneous 3 times a day (before meals) ; 1 Unit(s) if Glucose 151 - 200  2 Unit(s) if Glucose 201 - 250  3 Unit(s) if Glucose 251 - 300  4 Unit(s) if Glucose 301 - 350  5 Unit(s) if Glucose 351 - 400  6 Unit(s) if Glucose GREATER THAN 400  -- Indication: For Diabetes    metoclopramide 5 mg oral tablet  -- 1 tab(s) by mouth 4 times a day (before meals and at bedtime)  -- Indication: For Nausea    diphenhydrAMINE 50 mg oral capsule  -- 1 cap(s) by mouth once a day (at bedtime)  -- Indication: For COugh    atorvastatin 40 mg oral tablet  -- 1 tab(s) by mouth once a day (at bedtime)  -- Indication: For HLD    clopidogrel 75 mg oral tablet  -- 1 tab(s) by mouth once a day  -- Indication: For CAD    albuterol 90 mcg/inh inhalation aerosol  -- 2 puff(s) inhaled every 6 hours, As needed, Shortness of Breath and/or Wheezing  -- Indication: For Wheezing    albuterol-ipratropium 2.5 mg-0.5 mg/3 mL inhalation solution  -- 3 milliliter(s) inhaled every 6 hours, As Needed wheezing/shortness of breath  -- Indication: For wheezing    guaiFENesin 100 mg/5 mL oral liquid  -- 5 milliliter(s) by mouth every 6 hours, As needed, Cough  -- Indication: For COugh    famotidine 20 mg oral tablet  -- 1 tab(s) by mouth once a day  -- Indication: For GERD    pantoprazole 40 mg oral delayed release tablet  -- 1 tab(s) by mouth once a day (before a meal)  -- Indication: For GERD    oxybutynin 5 mg oral tablet  -- 1 tab(s) by mouth 4 times a day  -- Indication: For urinary retention I will START or STAY ON the medications listed below when I get home from the hospital:    acetaminophen 325 mg oral tablet  -- 2 tab(s) by mouth every 6 hours, As needed, Mild Pain (1 - 3)  -- Indication: For pain    aspirin 81 mg oral delayed release tablet  -- 1 tab(s) by mouth once a day  -- Indication: For CAD    lisinopril 2.5 mg oral tablet  -- 1 tab(s) by mouth once a day  -- Indication: For CAD    gabapentin 600 mg oral tablet  -- 1 tab(s) by mouth 4 times a day  -- Indication: For neuropathic pain    imipramine 50 mg oral tablet  -- 1 tab(s) by mouth once a day (at bedtime)  -- Indication: For GI stricture/dysmotility    insulin glargine  -- 50 unit(s) subcutaneous every 12 hours  -- Indication: For Diabetes    insulin lispro 100 units/mL subcutaneous solution  --  subcutaneous 3 times a day (before meals) ; 1 Unit(s) if Glucose 151 - 200  2 Unit(s) if Glucose 201 - 250  3 Unit(s) if Glucose 251 - 300  4 Unit(s) if Glucose 301 - 350  5 Unit(s) if Glucose 351 - 400  6 Unit(s) if Glucose GREATER THAN 400  -- Indication: For Diabetes    insulin lispro 100 units/mL subcutaneous solution  -- 2 unit(s) subcutaneous 3 times a day (before meals)  -- Indication: For Diabetes    diphenhydrAMINE 50 mg oral capsule  -- 1 cap(s) by mouth once a day (at bedtime)  -- Indication: For COugh    metoclopramide 5 mg oral tablet  -- 1 tab(s) by mouth 4 times a day (before meals and at bedtime)  -- Indication: For Nausea    atorvastatin 40 mg oral tablet  -- 1 tab(s) by mouth once a day (at bedtime)  -- Indication: For HLD    clopidogrel 75 mg oral tablet  -- 1 tab(s) by mouth once a day  -- Indication: For CAD    albuterol 90 mcg/inh inhalation aerosol  -- 2 puff(s) inhaled every 6 hours, As needed, Shortness of Breath and/or Wheezing  -- Indication: For Wheezing    albuterol-ipratropium 2.5 mg-0.5 mg/3 mL inhalation solution  -- 3 milliliter(s) inhaled every 6 hours, As Needed wheezing/shortness of breath  -- Indication: For wheezing    guaiFENesin 100 mg/5 mL oral liquid  -- 5 milliliter(s) by mouth every 6 hours, As needed, Cough  -- Indication: For COugh    famotidine 20 mg oral tablet  -- 1 tab(s) by mouth once a day  -- Indication: For GERD    pantoprazole 40 mg oral delayed release tablet  -- 1 tab(s) by mouth once a day (before a meal)  -- Indication: For GERD    oxybutynin 5 mg oral tablet  -- 1 tab(s) by mouth 4 times a day  -- Indication: For urinary retention I will START or STAY ON the medications listed below when I get home from the hospital:    acetaminophen 325 mg oral tablet  -- 2 tab(s) by mouth every 6 hours, As needed, Mild Pain (1 - 3)  -- Indication: For pain    aspirin 81 mg oral delayed release tablet  -- 1 tab(s) by mouth once a day  -- Indication: For CAD    lisinopril 2.5 mg oral tablet  -- 1 tab(s) by mouth once a day  -- Indication: For CAD    gabapentin 600 mg oral tablet  -- 1 tab(s) by mouth 4 times a day  -- Indication: For neuropathic pain    imipramine 50 mg oral tablet  -- 1 tab(s) by mouth once a day (at bedtime)  -- Indication: For GI stricture/dysmotility    insulin glargine  -- 25 unit(s) subcutaneous 2 times a day  -- Indication: For Diabetes mellitus    insulin lispro 100 units/mL subcutaneous solution  -- 1 Unit(s) if Glucose 151 - 200  2 Unit(s) if Glucose 201 - 250  3 Unit(s) if Glucose 251 - 300  4 Unit(s) if Glucose 301 - 350  5 Unit(s) if Glucose 351 - 400  6 Unit(s) if Glucose GREATER THAN 400  -- Indication: For Diabetes    metoclopramide 5 mg oral tablet  -- 1 tab(s) by mouth 4 times a day (before meals and at bedtime)  -- Indication: For Nausea    diphenhydrAMINE 50 mg oral capsule  -- 1 cap(s) by mouth once a day (at bedtime)  -- Indication: For COugh    atorvastatin 40 mg oral tablet  -- 1 tab(s) by mouth once a day (at bedtime)  -- Indication: For HLD    clopidogrel 75 mg oral tablet  -- 1 tab(s) by mouth once a day  -- Indication: For CAD    albuterol 90 mcg/inh inhalation aerosol  -- 2 puff(s) inhaled every 6 hours, As needed, Shortness of Breath and/or Wheezing  -- Indication: For Wheezing    albuterol-ipratropium 2.5 mg-0.5 mg/3 mL inhalation solution  -- 3 milliliter(s) inhaled every 6 hours, As Needed wheezing/shortness of breath  -- Indication: For wheezing    guaiFENesin 100 mg/5 mL oral liquid  -- 5 milliliter(s) by mouth every 6 hours, As needed, Cough  -- Indication: For COugh    famotidine 20 mg oral tablet  -- 1 tab(s) by mouth once a day  -- Indication: For GERD    pantoprazole 40 mg oral delayed release tablet  -- 1 tab(s) by mouth once a day (before a meal)  -- Indication: For GERD    oxybutynin 5 mg oral tablet  -- 1 tab(s) by mouth 4 times a day  -- Indication: For urinary retention

## 2017-08-11 NOTE — DISCHARGE NOTE ADULT - PATIENT PORTAL LINK FT
“You can access the [employer] Patient Portal, offered by [facility], by registering with the following website: [email or web address]”

## 2017-08-11 NOTE — DISCHARGE NOTE ADULT - MEDICATION SUMMARY - MEDICATIONS TO STOP TAKING
I will STOP taking the medications listed below when I get home from the hospital:    levoFLOXacin 500 mg oral tablet  -- 1 tab(s) by mouth once a day  -- Avoid prolonged or excessive exposure to direct and/or artificial sunlight while taking this medication.  Do not take dairy products, antacids, or iron preparations within one hour of this medication.  Finish all this medication unless otherwise directed by prescriber.  May cause drowsiness or dizziness.  Medication should be taken with plenty of water.

## 2017-08-11 NOTE — PROGRESS NOTE ADULT - ASSESSMENT
History of coronary artery disease as well as non-Q wave MI. Chest pain has resolved. On August 7  coronary angiogram showed nonobstructive coronary artery disease.    Reflux as well as dysphagia, would maintain patient on proton pump inhibitors.  However, I am concern for esophageal stricture or other pathology resulting in her dysphagia. Due to her cough as well as other comorbid risk factors and  on oxygen at this time, would delay endoscopy.  Would maintain patient on a soft diet as well as check barium esophagram for etiology.    Bronchitis–possible viral. Appears to be a little bit improving and she is on antibiotics.  We'll await above evaluation and continue treatment for reflux.  Eventual endoscopy and risk of missed diagnoses without follow-up for endoscopy discussed with patient.  We also talked about possibility association of esophageal cancer with dysphagia and risk of missed diagnoses without follow-up for evaluation of this was discussed with her as well.    DM and she is on insulin coverage      Discussed with patient that I do not accept her insurance as an outpatient.  I provided her with a names of gastroenterologists at 2 including that of Dr. Woodward.  Upon discharge will follow up with him.  I also discussed the case with Portillo Woodward, was not available at this time to assume patient's care, but willing to see pt as put pt.

## 2017-08-11 NOTE — PHYSICAL THERAPY INITIAL EVALUATION ADULT - GAIT DISTANCE, PT EVAL
150 feet/Pt amb on RA, SpO2 fluctuating from 87-92%, pt with no c/o SOB, however intermittent labored breathing noted.

## 2017-08-11 NOTE — DISCHARGE NOTE ADULT - HOSPITAL COURSE
58 y/o woman with pmhx COPD/emphysema, chronic cough,  lung cancer, DM uncontrolled, MI, CABG,  presenting to the  ED due to SOB and CP X1 day. Patient is homeless, resides in the shelter. In the ED, mildly elevated trops noted. Patient admitted to r/o ACS and was about to undergo cardiac cath on 8/4 but deferred in the setting of fever temp 102.  Stan subseqenttly had cardiac cath done which showed nonobstructive CAD.  Stan was started on medical management for NSTEMI, Hospital course complicated with uncontrolled BGM- insulin regimen adjusted as per blood glucose reading. Patient was counselled on compliance with diabetic regimen. Patient was also noted to desaturate on RA  and requiring supplemental oxygen- as per CM homeless shelter will not be able to take patient back due to above so plan is for PT consult for possible discahreg to rehab facility- patient is in agreement with plan for rehab, 58 y/o woman with pmhx COPD/emphysema, chronic cough,  lung cancer, DM uncontrolled, MI, CABG,  presenting to the  ED due to SOB and CP X1 day. Patient is homeless, resides in the shelter. In the ED, mildly elevated trops noted. Patient admitted to r/o ACS and was about to undergo cardiac cath on 8/4 but deferred in the setting of fever temp 102.  Stan subseqenttly had cardiac cath done which showed nonobstructive CAD.  Stan was started on medical management for NSTEMI, Hospital course complicated with uncontrolled BGM- insulin regimen adjusted as per blood glucose reading. Patient was counselled on compliance with diabetic regimen. Patient was also noted to desaturate on RA  and requiring supplemental oxygen- as per CM homeless shelter will not be able to take patient back due to above so plan is for PT consult for possible discahreg to rehab facility. Patient will need to f/u with Dr Vines as an outpatient for further workup of esophageal dysmotility and stricture. Vital Signs Last 24 Hrs  T(C): 36.6 (18 Aug 2017 11:23), Max: 37.2 (18 Aug 2017 05:46)  T(F): 97.9 (18 Aug 2017 11:23), Max: 98.9 (18 Aug 2017 05:46)  HR: 102 (18 Aug 2017 11:23) (90 - 102)  BP: 126/72 (18 Aug 2017 11:23) (121/69 - 126/72)  BP(mean): --  RR: 18 (18 Aug 2017 11:23) (18 - 19)  SpO2: 93% (18 Aug 2017 11:23) (91% - 95%)    HEENT:   pupils equal and reactive, EOMI, no oropharyngeal lesions, erythema, exudates, oral thrush    NECK:   supple, no carotid bruits, no palpable lymph nodes, no thyromegaly    CV:  +S1, +S2, regular, no murmurs or rubs    RESP:   lungs clear to auscultation bilaterally, no wheezing, rales, rhonchi, good air entry bilaterally    BREAST:  not examined    GI:  abdomen soft, non-tender, non-distended, normal BS, no bruits, no abdominal masses, no palpable masses    RECTAL:  not examined    :  not examined    MSK:   normal muscle tone, no atrophy, no rigidity, no contractions    EXT:   no clubbing, no cyanosis, no edema, no calf pain, swelling or erythema    VASCULAR:  pulses equal and symmetric in the upper and lower extremities    NEURO:  AAOX3, no focal neurological deficits, follows all commands, able to move extremities spontaneously    SKIN:  no ulcers, lesions or rashes     Hospital course:    56 y/o woman with pmhx COPD/emphysema, chronic cough,  lung cancer, DM uncontrolled, MI, CABG,  presenting to the  ED due to SOB and CP X1 day. Patient is homeless, resides in the shelter. In the ED, mildly elevated trops noted. Patient admitted to r/o ACS and was about to undergo cardiac cath on 8/4 but deferred in the setting of fever temp 102.  Stan subseqenttly had cardiac cath done which showed nonobstructive CAD.  Stan was started on medical management for NSTEMI, Hospital course complicated with uncontrolled BGM- insulin regimen adjusted as per blood glucose reading. Patient was counselled on compliance with diabetic regimen. Patient was also noted to desaturate on RA  and requiring supplemental oxygen- as per CM homeless shelter will not be able to take patient back due to above so plan is for PT consult for possible discharge to rehab facility. Patient will need to f/u with Dr Vines as an outpatient for further workup of esophageal dysmotility and Possible stricture(although Esophagram did not show any obvious stricture). As per GI there was no need for acute GI intervention.

## 2017-08-11 NOTE — PROGRESS NOTE ADULT - SUBJECTIVE AND OBJECTIVE BOX
Patient is a 57y old  Female who presents with a chief complaint of chest pain (04 Aug 2017 02:33)      HPI:  Pt is a 58 y/o woman with pmhx COPD/emphysema, chronic cough,  lung cancer, DM, MI, CABG,  presenting to the  ED due to SOB and CP since yesterday.   She reports chest pain is non radiating and 9/10.   She states it  feels like "someone is sitting on her chest".  Pt reports resting and lying still, helps the pain.   She c/o nausea and  mild abd pain for which she reports pepcid usually helps.    In the ER, she received Duonebs, I L IVF. Also hycodan was given for a coughing fit.   She received ASA.  Troponin was positive .      Patient with continued cough but improved. Mild shortness of breath.  She states that she continues to have intermittent reflux. Associated with this she also has epigastric pain. She states that she feels food going down but is no longer getting stuck on the soft diet.  Discussed with hospitalist yesterday    Cough increases with deep breathing. Unchanged with movement. No other exacerbating factors. It's been going on for a while but it is a little bit improved.  She has mild epigastric pain that increases after meals as well. Burning in sensation, no radiation    Past Med/Surg Hx   COPD,   Emphysema,   Lung cancer,   DM,   CABG three vessel, MI    Fam Hx:  non-contrib to current adm (04 Aug 2017 02:33)      PAST MEDICAL & SURGICAL HISTORY:  Lung cancer  COPD (chronic obstructive pulmonary disease)  No significant past surgical history      MEDICATIONS  (STANDING):  heparin  Injectable 5000 Unit(s) SubCutaneous every 12 hours  lisinopril 2.5 milliGRAM(s) Oral daily  gabapentin 600 milliGRAM(s) Oral four times a day  atorvastatin 40 milliGRAM(s) Oral at bedtime  QUEtiapine 50 milliGRAM(s) Oral two times a day  famotidine    Tablet 20 milliGRAM(s) Oral daily  pantoprazole    Tablet 40 milliGRAM(s) Oral before breakfast  dextrose 5%. 1000 milliLiter(s) (50 mL/Hr) IV Continuous <Continuous>  dextrose 50% Injectable 12.5 Gram(s) IV Push once  dextrose 50% Injectable 25 Gram(s) IV Push once  dextrose 50% Injectable 25 Gram(s) IV Push once  oxybutynin 10 milliGRAM(s) Oral three times a day  ALBUTerol/ipratropium for Nebulization 3 milliLiter(s) Nebulizer every 6 hours  clopidogrel Tablet 75 milliGRAM(s) Oral daily  aspirin enteric coated 81 milliGRAM(s) Oral daily  insulin lispro (HumaLOG) corrective regimen sliding scale   SubCutaneous three times a day before meals  insulin glargine Injectable (LANTUS) 28 Unit(s) SubCutaneous at bedtime  insulin lispro Injectable (HumaLOG) 2 Unit(s) SubCutaneous three times a day before meals    MEDICATIONS  (PRN):  acetaminophen   Tablet. 650 milliGRAM(s) Oral every 6 hours PRN Mild Pain (1 - 3)  ALBUTerol    90 MICROgram(s) HFA Inhaler 2 Puff(s) Inhalation every 6 hours PRN Shortness of Breath and/or Wheezing  guaiFENesin    Syrup 100 milliGRAM(s) Oral every 6 hours PRN Cough  dextrose Gel 1 Dose(s) Oral once PRN Blood Glucose LESS THAN 70 milliGRAM(s)/deciLiter  glucagon  Injectable 1 milliGRAM(s) IntraMuscular once PRN Glucose <70 milliGRAM(s)/deciLiter  oxyCODONE    5 mG/acetaminophen 325 mG 1 Tablet(s) Oral every 4 hours PRN Severe Pain (7 - 10)  promethazine Syrup 5 milliGRAM(s) Oral every 6 hours PRN Cough  ondansetron Injectable 4 milliGRAM(s) IV Push every 4 hours PRN Nausea and/or Vomiting  melatonin 3 milliGRAM(s) Oral at bedtime PRN Insomnia      Allergies    Dilaudid (Unknown)    Intolerances        SOCIAL HISTORY:NC    FAMILY HISTORY:  No pertinent family history in first degree relatives      REVIEW OF SYSTEMS:    CONSTITUTIONAL: No weakness, fevers or chills  EYES/ENT: No visual changes;  No vertigo or throat pain   NECK: No pain or stiffness  RESPIRATORY: No cough, wheezing, hemoptysis; No shortness of breath  CARDIOVASCULAR: No chest pain or palpitations  GENITOURINARY: No dysuria, frequency or hematuria  NEUROLOGICAL: No numbness or weakness  SKIN: No itching, burning, rashes, or lesions   All other review of systems is negative unless indicated above.    Vital Signs Last 24 Hrs  T(C): 36.8 (11 Aug 2017 06:11), Max: 37.1 (10 Aug 2017 11:15)  T(F): 98.2 (11 Aug 2017 06:11), Max: 98.7 (10 Aug 2017 11:15)  HR: 88 (11 Aug 2017 06:11) (76 - 95)  BP: 114/53 (11 Aug 2017 06:11) (11/50 - 127/58)  BP(mean): --  RR: 18 (11 Aug 2017 06:11) (18 - 18)  SpO2: 98% (11 Aug 2017 06:11) (94% - 98%)    PHYSICAL EXAM:    Constitutional: NAD, well-developed  HEENT: EOMI, throat clear  Neck: No LAD, supple  Respiratory: CTA and P  Cardiovascular: S1 and S2, RRR, no M  Gastrointestinal: BS+, soft, epig tend/ND, neg HSM,  Extremities: No peripheral edema, neg clubing, cyanosis  Vascular: 2+ peripheral pulses  Neurological: A/O x 3, no focal deficits  Psychiatric: Normal mood, normal affect  Skin: No rashes    LABS:                  RADIOLOGY & ADDITIONAL STUDIES:  < from: CT Brain Stroke Protocol (08.04.17 @ 22:04) >  EXAM:  CT BRAIN STROKE PROTOCOL                            PROCEDURE DATE:  08/04/2017          INTERPRETATION:  EXAM: CT HEAD WITHOUT INTRAVENOUS CONTRAST    CLINICAL INFORMATION: Altered mental status with lethargy and, slurred   speech and word finding difficulties    TECHNIQUE: Noncontrast axial CT images were acquired through the head.   Two-dimensional sagittal and coronal reformats were generated.    COMPARISON STUDY: None    FINDINGS:   There is no CT evidence of acute intracranial hemorrhage, extra-axial   collection, vasogenic edema, mass effect, midline shift, central   herniation, hydrocephalus or acute territorial infarct.     The ventricles, sulci and axial spaces appear within normal limits.    The visualized paranasal sinuses are clear.    The mastoid air cells and middle ear cavities are grossly clear.    The calvarium, skull base, visualized facial bones and regional soft   tissues are grossly unremarkable.    CRITICAL VALUE: I discussed the major findings in this report with Dr. Crawford on 08/04/2017 at 10:10 PM.  Critical value policy of the   hospital was followed. Read back and confirmation of receipt of this   communication was performed. This verbal communication supplements the   text report of this document.    IMPRESSION:   No CT evidence of acute intracranial hemorrhage, brain edema, mass effect   or acute territorial infarct.  If neurologic symptoms persist follow-up   MRI is recommended for further characterization.      < end of copied text >

## 2017-08-11 NOTE — DISCHARGE NOTE ADULT - CARE PLAN
Principal Discharge DX:	Chest pain, unspecified type  Goal:	prevent worsening  Instructions for follow-up, activity and diet:	f/u with cardiologist as an outpatient  contineu current medication regimen  Secondary Diagnosis:	COPD exacerbation  Goal:	prevent further exacerbation  Instructions for follow-up, activity and diet:	continue nebulizer treatment  f/u with your PCP in the [facility]  Secondary Diagnosis:	Hyperlipidemia, unspecified hyperlipidemia type  Instructions for follow-up, activity and diet:	contienu current regimen  Secondary Diagnosis:	Dysphagia, unspecified type  Instructions for follow-up, activity and diet:	f/u with GI doctor as an outpatient   cotninue pepcid and protonix  Secondary Diagnosis:	Uncontrolled type 2 diabetes mellitus with complication, unspecified long term insulin use status  Instructions for follow-up, activity and diet:	cotninue diabetic diet  continue insulin regimen with lantus and lispro Principal Discharge DX:	Chest pain, unspecified type  Goal:	prevent worsening  Instructions for follow-up, activity and diet:	f/u with cardiologist as an outpatient  contineu current medication regimen  Secondary Diagnosis:	COPD exacerbation  Goal:	prevent further exacerbation  Instructions for follow-up, activity and diet:	continue nebulizer treatment  f/u with your PCP in the [facility]  Secondary Diagnosis:	Hyperlipidemia, unspecified hyperlipidemia type  Instructions for follow-up, activity and diet:	contienu current regimen  Secondary Diagnosis:	Dysphagia, unspecified type  Instructions for follow-up, activity and diet:	f/u with GI doctor as an outpatient - f/u with Dr Trace byrd pepcid and protonix  Secondary Diagnosis:	Uncontrolled type 2 diabetes mellitus with complication, unspecified long term insulin use status  Instructions for follow-up, activity and diet:	carson diabetic diet  continue insulin regimen with lantus and lispro Principal Discharge DX:	Chest pain, unspecified type  Goal:	prevent worsening  Instructions for follow-up, activity and diet:	f/u with cardiologist as an outpatient  contineu current medication regimen  Secondary Diagnosis:	COPD exacerbation  Goal:	prevent further exacerbation  Instructions for follow-up, activity and diet:	continue nebulizer treatment  f/u with your PCP in the [facility]  Secondary Diagnosis:	Hyperlipidemia, unspecified hyperlipidemia type  Instructions for follow-up, activity and diet:	contienu current regimen  Secondary Diagnosis:	Dysphagia, unspecified type  Instructions for follow-up, activity and diet:	f/u with GI doctor as an outpatient - f/u with Dr Trace byrd pepcid and protonix  Secondary Diagnosis:	Uncontrolled type 2 diabetes mellitus with complication, unspecified long term insulin use status  Instructions for follow-up, activity and diet:	carson diabetic diet  continue insulin regimen with lantus and lispro Principal Discharge DX:	Chest pain, unspecified type  Goal:	prevent worsening  Instructions for follow-up, activity and diet:	f/u with cardiologist as an outpatient  contineu current medication regimen  Secondary Diagnosis:	COPD exacerbation  Goal:	prevent further exacerbation  Instructions for follow-up, activity and diet:	continue nebulizer treatment  f/u with your PCP in the [facility]  Secondary Diagnosis:	Hyperlipidemia, unspecified hyperlipidemia type  Instructions for follow-up, activity and diet:	contienu current regimen  Secondary Diagnosis:	Dysphagia, unspecified type  Instructions for follow-up, activity and diet:	f/u with GI doctor as an outpatient - f/u with Dr Trace byrd pepcid and protonix  Secondary Diagnosis:	Uncontrolled type 2 diabetes mellitus with complication, unspecified long term insulin use status  Instructions for follow-up, activity and diet:	carson diabetic diet  continue insulin regimen with lantus and lispro Principal Discharge DX:	Chest pain, unspecified type  Goal:	prevent worsening  Instructions for follow-up, activity and diet:	f/u with cardiologist as an outpatient  contineu current medication regimen  Secondary Diagnosis:	COPD exacerbation  Goal:	prevent further exacerbation  Instructions for follow-up, activity and diet:	continue nebulizer treatment  f/u with your PCP in the [facility]  Secondary Diagnosis:	Hyperlipidemia, unspecified hyperlipidemia type  Instructions for follow-up, activity and diet:	contienu current regimen  Secondary Diagnosis:	Dysphagia, unspecified type  Instructions for follow-up, activity and diet:	f/u with GI doctor as an outpatient - f/u with Dr Trace byrd pepcid and protonix  Secondary Diagnosis:	Uncontrolled type 2 diabetes mellitus with complication, unspecified long term insulin use status  Instructions for follow-up, activity and diet:	carson diabetic diet  continue insulin regimen with lantus and lispro Principal Discharge DX:	Chest pain, unspecified type  Goal:	prevent worsening  Instructions for follow-up, activity and diet:	f/u with cardiologist as an outpatient  contineu current medication regimen  Secondary Diagnosis:	COPD exacerbation  Goal:	prevent further exacerbation  Instructions for follow-up, activity and diet:	continue nebulizer treatment  f/u with your PCP in the [facility]  Secondary Diagnosis:	Hyperlipidemia, unspecified hyperlipidemia type  Instructions for follow-up, activity and diet:	contienu current regimen  Secondary Diagnosis:	Dysphagia, unspecified type  Instructions for follow-up, activity and diet:	f/u with GI doctor as an outpatient - f/u with Dr Trace byrd pepcid and protonix  Secondary Diagnosis:	Uncontrolled type 2 diabetes mellitus with complication, unspecified long term insulin use status  Instructions for follow-up, activity and diet:	carson diabetic diet  continue insulin regimen with lantus and lispro

## 2017-08-11 NOTE — PROGRESS NOTE ADULT - SUBJECTIVE AND OBJECTIVE BOX
57F.  presenting to the  ED due to SOB and CP X1 day. Patient is homeless, resides in the shelter. In the ED, mildly elevated trops noted. Patient admitted to r/o ACS and was about to undergo cardiac cath on 8/4 but deferred in the setting of fever temp 102. Patient treated for viral bronchitis and given Azithro. She underwent cath on 8/7 revealing nonobstructive CAD. During hospital course patient had 1:1 for closer monitoring after episode of confusion / depressed MS (work up negative) and refuses to part with personal bag in room.     8/11- seen and examined. OOB to chair. denies pain, fever or chills.     Vital Signs Last 24 Hrs  T(C): 36.7 (11 Aug 2017 12:22), Max: 36.8 (11 Aug 2017 06:11)  T(F): 98.1 (11 Aug 2017 12:22), Max: 98.2 (11 Aug 2017 06:11)  HR: 78 (11 Aug 2017 14:00) (76 - 95)  BP: 94/45 (11 Aug 2017 12:22) (94/45 - 127/58)  BP(mean): --  RR: 17 (11 Aug 2017 12:22) (17 - 18)  SpO2: 88% (11 Aug 2017 14:00) (88% - 99%)    ROS:   All 10 systems reviewed and found to be negative with the exception of what has been described above.    PE:  Constitutional: NAD, laying in bed  HEENT: NC/AT, EOMI, PERRLA  Neck: supple  Back: no tenderness  Respiratory: LCTA  Cardiovascular: S1S2 regular, no murmurs  Abdomen: soft, not tender, not distended, positive BS  Genitourinary: voiding  Rectal: deferred  Musculoskeletal: no muscle tenderness, no joint swelling or tenderness  Extremities: no pedal edema   Neurological: no focal deficits      MEDICATIONS  (STANDING):  heparin  Injectable 5000 Unit(s) SubCutaneous every 12 hours  lisinopril 2.5 milliGRAM(s) Oral daily  gabapentin 600 milliGRAM(s) Oral four times a day  atorvastatin 40 milliGRAM(s) Oral at bedtime  famotidine    Tablet 20 milliGRAM(s) Oral daily  pantoprazole    Tablet 40 milliGRAM(s) Oral before breakfast  dextrose 5%. 1000 milliLiter(s) (50 mL/Hr) IV Continuous <Continuous>  dextrose 50% Injectable 12.5 Gram(s) IV Push once  dextrose 50% Injectable 25 Gram(s) IV Push once  dextrose 50% Injectable 25 Gram(s) IV Push once  oxybutynin 10 milliGRAM(s) Oral three times a day  ALBUTerol/ipratropium for Nebulization 3 milliLiter(s) Nebulizer every 6 hours  clopidogrel Tablet 75 milliGRAM(s) Oral daily  aspirin enteric coated 81 milliGRAM(s) Oral daily  insulin lispro (HumaLOG) corrective regimen sliding scale   SubCutaneous three times a day before meals  insulin glargine Injectable (LANTUS) 40 Unit(s) SubCutaneous two times a day  insulin lispro Injectable (HumaLOG) 2 Unit(s) SubCutaneous three times a day before meals    MEDICATIONS  (PRN):  acetaminophen   Tablet. 650 milliGRAM(s) Oral every 6 hours PRN Mild Pain (1 - 3)  ALBUTerol    90 MICROgram(s) HFA Inhaler 2 Puff(s) Inhalation every 6 hours PRN Shortness of Breath and/or Wheezing  guaiFENesin    Syrup 100 milliGRAM(s) Oral every 6 hours PRN Cough  dextrose Gel 1 Dose(s) Oral once PRN Blood Glucose LESS THAN 70 milliGRAM(s)/deciLiter  glucagon  Injectable 1 milliGRAM(s) IntraMuscular once PRN Glucose <70 milliGRAM(s)/deciLiter  oxyCODONE    5 mG/acetaminophen 325 mG 1 Tablet(s) Oral every 4 hours PRN Severe Pain (7 - 10)  promethazine Syrup 5 milliGRAM(s) Oral every 6 hours PRN Cough  ondansetron Injectable 4 milliGRAM(s) IV Push every 4 hours PRN Nausea and/or Vomiting  melatonin 3 milliGRAM(s) Oral at bedtime PRN Insomnia                                10.7   10.0  )-----------( 186      ( 09 Aug 2017 06:35 )             32.0       08-09    140  |  101  |  38<H>  ----------------------------<  149<H>  4.5   |  33<H>  |  0.89    Ca    9.5      09 Aug 2017 06:35    PLAN:     57F.  presenting to the  ED due to SOB and CP X1 day. Patient is homeless, resides in the shelter. In the ED, mildly elevated trops noted. Patient admitted to r/o ACS and was about to undergo cardiac cath on 8/4 but deferred in the setting of fever temp 102. Patient treated for viral bronchitis and given Azithro. She underwent cath on 8/7 revealing nonobstructive CAD.      *NQWMI (type 2).  hx CAD-AMI + CABG.  -chest pain resolved.  -08/07 coronary angiogram nonobstructive CAD.  -c/wa aspirin and plavix  - cardiology consult      *dysphagia.  hx GERD.  -questionable hx of esophageal stricture dx'd at [facility].  -difficulty eating.- on soft diet  -speech pathology input reviewed.  -PPI.  -GI input noted.  EGD deferred.    - esophagram showed esophageal dysmotility no evidence of masses or ulcers involving the   esophagus but there is esophageal dysmotility and distal esophageal spasm.          *suspect viral bronchitis.  hx COPD.  -resolving.  reports breathing has improved.  -azithromycin day 5 of 5.  -continue nebs  - Pulse ox resting 88- patient will need supplemental Oxygen on discharge- CM aware      *hx type 2 DM.  -uncontrolled.  -A1C 9.6%.  continue ISS coverage- increase Lantus to BIB 40U- continue premeal standing  - monitor BG     *DVT prop- Heparin    As per CM- shelter will not take patient on supplemental O2- PT consult for possible rehab placement. Case discussed with Dr D'amico. Discharge when there is an accepting facility and after PT eval,.

## 2017-08-11 NOTE — DISCHARGE NOTE ADULT - PROVIDER TOKENS
TOKEN:'[id number]',FREE:[LAST:[Dr Katrin Alcazar at [place]],PHONE:[(   )    -],FAX:[(   )    -],ADDRESS:[after discharge from [facility]]],TOKEN:'[id number]' TOKEN:'[id number]',FREE:[LAST:[Dr Katrin Alcazar at [place]],PHONE:[(   )    -],FAX:[(   )    -],ADDRESS:[after discharge from [facility]]],TOKEN:'[id number]'

## 2017-08-12 RX ORDER — INSULIN LISPRO 100/ML
5 VIAL (ML) SUBCUTANEOUS
Qty: 0 | Refills: 0 | Status: DISCONTINUED | OUTPATIENT
Start: 2017-08-12 | End: 2017-08-18

## 2017-08-12 RX ADMIN — Medication 3 MILLILITER(S): at 08:13

## 2017-08-12 RX ADMIN — PANTOPRAZOLE SODIUM 40 MILLIGRAM(S): 20 TABLET, DELAYED RELEASE ORAL at 06:08

## 2017-08-12 RX ADMIN — GABAPENTIN 600 MILLIGRAM(S): 400 CAPSULE ORAL at 01:14

## 2017-08-12 RX ADMIN — Medication 100 MILLIGRAM(S): at 14:30

## 2017-08-12 RX ADMIN — GABAPENTIN 600 MILLIGRAM(S): 400 CAPSULE ORAL at 06:08

## 2017-08-12 RX ADMIN — CLOPIDOGREL BISULFATE 75 MILLIGRAM(S): 75 TABLET, FILM COATED ORAL at 11:42

## 2017-08-12 RX ADMIN — Medication 10 MILLIGRAM(S): at 22:35

## 2017-08-12 RX ADMIN — Medication 2 UNIT(S): at 12:13

## 2017-08-12 RX ADMIN — Medication 2 UNIT(S): at 08:31

## 2017-08-12 RX ADMIN — INSULIN GLARGINE 40 UNIT(S): 100 INJECTION, SOLUTION SUBCUTANEOUS at 08:30

## 2017-08-12 RX ADMIN — Medication 3 MILLILITER(S): at 13:57

## 2017-08-12 RX ADMIN — Medication 4: at 12:13

## 2017-08-12 RX ADMIN — Medication 81 MILLIGRAM(S): at 11:41

## 2017-08-12 RX ADMIN — FAMOTIDINE 20 MILLIGRAM(S): 10 INJECTION INTRAVENOUS at 11:41

## 2017-08-12 RX ADMIN — GABAPENTIN 600 MILLIGRAM(S): 400 CAPSULE ORAL at 11:42

## 2017-08-12 RX ADMIN — Medication 3 MILLILITER(S): at 19:04

## 2017-08-12 RX ADMIN — Medication 3: at 08:34

## 2017-08-12 RX ADMIN — INSULIN GLARGINE 40 UNIT(S): 100 INJECTION, SOLUTION SUBCUTANEOUS at 22:34

## 2017-08-12 RX ADMIN — GABAPENTIN 600 MILLIGRAM(S): 400 CAPSULE ORAL at 17:48

## 2017-08-12 RX ADMIN — HEPARIN SODIUM 5000 UNIT(S): 5000 INJECTION INTRAVENOUS; SUBCUTANEOUS at 17:48

## 2017-08-12 RX ADMIN — HEPARIN SODIUM 5000 UNIT(S): 5000 INJECTION INTRAVENOUS; SUBCUTANEOUS at 06:08

## 2017-08-12 RX ADMIN — GABAPENTIN 600 MILLIGRAM(S): 400 CAPSULE ORAL at 23:38

## 2017-08-12 RX ADMIN — Medication 2: at 16:40

## 2017-08-12 RX ADMIN — Medication 10 MILLIGRAM(S): at 06:08

## 2017-08-12 RX ADMIN — Medication 10 MILLIGRAM(S): at 14:02

## 2017-08-12 RX ADMIN — Medication 2 UNIT(S): at 16:41

## 2017-08-12 RX ADMIN — Medication 3 MILLILITER(S): at 02:15

## 2017-08-12 RX ADMIN — LISINOPRIL 2.5 MILLIGRAM(S): 2.5 TABLET ORAL at 06:08

## 2017-08-12 RX ADMIN — ATORVASTATIN CALCIUM 40 MILLIGRAM(S): 80 TABLET, FILM COATED ORAL at 22:35

## 2017-08-12 RX ADMIN — Medication 100 MILLIGRAM(S): at 08:29

## 2017-08-12 NOTE — PROGRESS NOTE ADULT - SUBJECTIVE AND OBJECTIVE BOX
coughing, CP    57F.  presenting to the  ED due to SOB and CP X1 day. Patient is homeless, resides in the shelter. In the ED, mildly elevated trops noted. Patient admitted to r/o ACS and was about to undergo cardiac cath on 8/4 but deferred in the setting of fever temp 102. Patient treated for viral bronchitis and given Azithro. She underwent cath on 8/7 revealing nonobstructive CAD. During hospital course patient had 1:1 for closer monitoring after episode of confusion / depressed MS (work up negative) and refuses to part with personal bag in room.     PMHx:  COPD;  lung CA;  DM;  CAD-AMI + CABG.    08/09-trouble swallowing.  "I have a esophagus the size of a child and nothing goes down."    telemetry reviewed, NSR.    08/10-resting comfortably.  offers no new complaints.    08/11-questioning when she will leave and her requirement for oxygen.  breathing comfortably on O2 via NC.    ROS:  ? weight loss (I don't have a scale);  no NV;  no abdominal pain;  no CP.    wf appearing older than stated age in no acute respiratory distress.  HEENT:  unremarkable.  neck:  supple.  lungs:  CTA.  heart:  RRR.  abd:  soft.  NT.  ND.  ext:  no edema.  neuro:  A&Ox3.  nonfocal    MEDICATIONS  (STANDING):  heparin  Injectable 5000 Unit(s) SubCutaneous every 12 hours  lisinopril 2.5 milliGRAM(s) Oral daily  gabapentin 600 milliGRAM(s) Oral four times a day  atorvastatin 40 milliGRAM(s) Oral at bedtime  famotidine    Tablet 20 milliGRAM(s) Oral daily  pantoprazole    Tablet 40 milliGRAM(s) Oral before breakfast  dextrose 5%. 1000 milliLiter(s) (50 mL/Hr) IV Continuous <Continuous>  dextrose 50% Injectable 12.5 Gram(s) IV Push once  dextrose 50% Injectable 25 Gram(s) IV Push once  dextrose 50% Injectable 25 Gram(s) IV Push once  oxybutynin 10 milliGRAM(s) Oral three times a day  ALBUTerol/ipratropium for Nebulization 3 milliLiter(s) Nebulizer every 6 hours  clopidogrel Tablet 75 milliGRAM(s) Oral daily  aspirin enteric coated 81 milliGRAM(s) Oral daily  insulin lispro (HumaLOG) corrective regimen sliding scale   SubCutaneous three times a day before meals  insulin glargine Injectable (LANTUS) 40 Unit(s) SubCutaneous two times a day  insulin lispro Injectable (HumaLOG) 5 Unit(s) SubCutaneous three times a day before meals    MEDICATIONS  (PRN):  acetaminophen   Tablet. 650 milliGRAM(s) Oral every 6 hours PRN Mild Pain (1 - 3)  ALBUTerol    90 MICROgram(s) HFA Inhaler 2 Puff(s) Inhalation every 6 hours PRN Shortness of Breath and/or Wheezing  guaiFENesin    Syrup 100 milliGRAM(s) Oral every 6 hours PRN Cough  dextrose Gel 1 Dose(s) Oral once PRN Blood Glucose LESS THAN 70 milliGRAM(s)/deciLiter  glucagon  Injectable 1 milliGRAM(s) IntraMuscular once PRN Glucose <70 milliGRAM(s)/deciLiter  promethazine Syrup 5 milliGRAM(s) Oral every 6 hours PRN Cough  ondansetron Injectable 4 milliGRAM(s) IV Push every 4 hours PRN Nausea and/or Vomiting  melatonin 3 milliGRAM(s) Oral at bedtime PRN Insomnia    Vital Signs Last 24 Hrs  T(C): 36.8 (12 Aug 2017 11:32), Max: 37 (11 Aug 2017 21:21)  T(F): 98.2 (12 Aug 2017 11:32), Max: 98.6 (11 Aug 2017 21:21)  HR: 88 (12 Aug 2017 13:55) (82 - 90)  BP: 119/63 (12 Aug 2017 11:32) (110/54 - 133/51)  BP(mean): --  RR: 18 (12 Aug 2017 11:32) (17 - 18)  SpO2: 97% (12 Aug 2017 11:32) (94% - 97%)    NQWMI (type 2).  hx CAD-AMI + CABG.  -chest pain resolved.  -08/07 coronary angiogram nonobstructive CAD.  -c/w DAPT + ACEI + statin.    dysphagia.  hx GERD.  -questionable hx of esophageal stricture dx'd at [place].  -difficulty eating.  -speech pathology input reviewed.  -08/11 barium esophagram, no evidence of masses or ulcers involving the esophagus but there is esophageal dysmotility and distal esophageal spasm.  -PPI.  -GI input noted.  EGD deferred.  GI f/u outpatient w/ Dr. Kaden Woodward.    suspect viral bronchitis.  hx COPD.  -document room air O2 saturation at rest and upon ambulation to determine if patient still requires supplemental O2.  -resolving.  reports breathing has improved.  -compled azithromycin 5 of 5.  -DUO neb q6h + JOANN HFA q6h PRN.  -guaifenesin.    hx type 2 DM.  -uncontrolled.  -ADA.  -A1C 9.6%.  -FSBG reviwed.  -escalate lispro 5units sq qac.  -c/w Lantus 40units sq qAM + qPM.  -c/w correction coverage.    disposition.  -d/c'd telemetry.  -transfered to [facility].  -patient is undomiciled.  case managment, re:  placement.

## 2017-08-13 PROCEDURE — 74176 CT ABD & PELVIS W/O CONTRAST: CPT | Mod: 26

## 2017-08-13 RX ORDER — INSULIN GLARGINE 100 [IU]/ML
42 INJECTION, SOLUTION SUBCUTANEOUS
Qty: 0 | Refills: 0 | Status: DISCONTINUED | OUTPATIENT
Start: 2017-08-13 | End: 2017-08-15

## 2017-08-13 RX ORDER — OXYCODONE AND ACETAMINOPHEN 5; 325 MG/1; MG/1
1 TABLET ORAL ONCE
Qty: 0 | Refills: 0 | Status: DISCONTINUED | OUTPATIENT
Start: 2017-08-13 | End: 2017-08-13

## 2017-08-13 RX ORDER — INSULIN LISPRO 100/ML
3 VIAL (ML) SUBCUTANEOUS ONCE
Qty: 0 | Refills: 0 | Status: COMPLETED | OUTPATIENT
Start: 2017-08-13 | End: 2017-08-13

## 2017-08-13 RX ADMIN — HEPARIN SODIUM 5000 UNIT(S): 5000 INJECTION INTRAVENOUS; SUBCUTANEOUS at 17:14

## 2017-08-13 RX ADMIN — Medication 2: at 09:23

## 2017-08-13 RX ADMIN — Medication 3 MILLILITER(S): at 20:18

## 2017-08-13 RX ADMIN — Medication 3 UNIT(S): at 17:14

## 2017-08-13 RX ADMIN — GABAPENTIN 600 MILLIGRAM(S): 400 CAPSULE ORAL at 17:15

## 2017-08-13 RX ADMIN — Medication 4: at 11:51

## 2017-08-13 RX ADMIN — Medication 5 UNIT(S): at 09:23

## 2017-08-13 RX ADMIN — INSULIN GLARGINE 42 UNIT(S): 100 INJECTION, SOLUTION SUBCUTANEOUS at 21:51

## 2017-08-13 RX ADMIN — FAMOTIDINE 20 MILLIGRAM(S): 10 INJECTION INTRAVENOUS at 11:48

## 2017-08-13 RX ADMIN — Medication 3 MILLILITER(S): at 02:23

## 2017-08-13 RX ADMIN — Medication 10 MILLIGRAM(S): at 15:35

## 2017-08-13 RX ADMIN — GABAPENTIN 600 MILLIGRAM(S): 400 CAPSULE ORAL at 05:29

## 2017-08-13 RX ADMIN — ATORVASTATIN CALCIUM 40 MILLIGRAM(S): 80 TABLET, FILM COATED ORAL at 21:52

## 2017-08-13 RX ADMIN — Medication 10 MILLIGRAM(S): at 21:51

## 2017-08-13 RX ADMIN — GABAPENTIN 600 MILLIGRAM(S): 400 CAPSULE ORAL at 11:48

## 2017-08-13 RX ADMIN — Medication 3 MILLILITER(S): at 13:56

## 2017-08-13 RX ADMIN — GABAPENTIN 600 MILLIGRAM(S): 400 CAPSULE ORAL at 23:23

## 2017-08-13 RX ADMIN — Medication 10 MILLIGRAM(S): at 05:29

## 2017-08-13 RX ADMIN — Medication 3 MILLILITER(S): at 09:07

## 2017-08-13 RX ADMIN — OXYCODONE AND ACETAMINOPHEN 1 TABLET(S): 5; 325 TABLET ORAL at 05:41

## 2017-08-13 RX ADMIN — ONDANSETRON 4 MILLIGRAM(S): 8 TABLET, FILM COATED ORAL at 05:28

## 2017-08-13 RX ADMIN — PANTOPRAZOLE SODIUM 40 MILLIGRAM(S): 20 TABLET, DELAYED RELEASE ORAL at 05:42

## 2017-08-13 RX ADMIN — Medication 81 MILLIGRAM(S): at 11:48

## 2017-08-13 RX ADMIN — ONDANSETRON 4 MILLIGRAM(S): 8 TABLET, FILM COATED ORAL at 21:51

## 2017-08-13 RX ADMIN — INSULIN GLARGINE 40 UNIT(S): 100 INJECTION, SOLUTION SUBCUTANEOUS at 09:24

## 2017-08-13 RX ADMIN — CLOPIDOGREL BISULFATE 75 MILLIGRAM(S): 75 TABLET, FILM COATED ORAL at 11:48

## 2017-08-13 RX ADMIN — ONDANSETRON 4 MILLIGRAM(S): 8 TABLET, FILM COATED ORAL at 16:11

## 2017-08-13 RX ADMIN — HEPARIN SODIUM 5000 UNIT(S): 5000 INJECTION INTRAVENOUS; SUBCUTANEOUS at 05:28

## 2017-08-13 RX ADMIN — LISINOPRIL 2.5 MILLIGRAM(S): 2.5 TABLET ORAL at 05:29

## 2017-08-13 NOTE — PROGRESS NOTE ADULT - ASSESSMENT
History of coronary artery disease as well as non-Q wave MI. Chest pain has resolved. On August 7  coronary angiogram showed nonobstructive coronary artery disease.    Reflux as well as dysphagia, would maintain patient on proton pump inhibitors.  However, I am concern for esophageal stricture or other pathology resulting in her dysphagia. Due to her cough as well as other comorbid risk factors and  on oxygen at this time, would delay endoscopy.  Would maintain patient on a soft diet as ba esoph does reveal stricture  diff including CA and stricture and risk missed Dx s FU DW pt    Bronchitis–possible viral. Appears to be a little bit improving and she is on antibiotics.  We'll await above evaluation and continue treatment for reflux.  Eventual endoscopy and risk of missed diagnoses without follow-up for endoscopy discussed with patient.  We also talked about possibility association of esophageal cancer with dysphagia and risk of missed diagnoses without follow-up for evaluation of this was discussed with her as well.    DM and she is on insulin coverage      Discussed with patient that I do not accept her insurance as an outpatient.  I provided her with a names of gastroenterologists at  including that of Dr. Woodward, again  Upon discharge will follow up with him.  I also discussed the case with Portillo Woodward, was not available at this time to assume patient's care, but willing to see pt as put pt.

## 2017-08-13 NOTE — PROGRESS NOTE ADULT - SUBJECTIVE AND OBJECTIVE BOX
coughing, CP    57F.  presenting to the  ED due to SOB and CP X1 day. Patient is homeless, resides in the shelter. In the ED, mildly elevated trops noted. Patient admitted to r/o ACS and was about to undergo cardiac cath on 8/4 but deferred in the setting of fever temp 102. Patient treated for viral bronchitis and given azithromycin. She underwent cath on 8/7 revealing nonobstructive CAD. During hospital course patient had 1:1 for closer monitoring after episode of confusion / depressed MS (work up negative) and refuses to part with personal bag in room.     PMHx:  COPD;  lung CA;  DM;  CAD-AMI + CABG.    08/09-trouble swallowing.  "I have a esophagus the size of a child and nothing goes down."    telemetry reviewed, NSR.    08/10-resting comfortably.  offers no new complaints.    08/11-questioning when she will leave and her requirement for oxygen.  breathing comfortably on O2 via NC.    08/12-breathin comfortable and unchanged.  c/o RUQ tenderness above known abdominal hernia she was told about 5 months ago by PMD.  feels that the, "hernia is pressing up on my lungs" making it more difficult to breath.  has not been further evaluated by a surgeon.  tenderness upon palpation.  no associated vomiting and has been tolerating diet.  hx open cholecystectomy, appendectomy and hysterectomy.    ROS:  (-) nausea.  (-) vomiting.  (+) RUQ tenderness over abdominal hernia.    general:  wf appearing older than stated age in no acute respiratory distress.  HEENT:  unremarkable.  neck:  supple.  lungs:  CTA.  no w/r/r.  heart:  RRR.  NS1S2.  no m/r/g.  abd:  (+) RUQ abdominal fullness.  mildly tender to palpation.  nonreducible.  ext:  no edema.  neuro:  A&Ox3.  nonfocal    MEDICATIONS  (STANDING):  heparin  Injectable 5000 Unit(s) SubCutaneous every 12 hours  lisinopril 2.5 milliGRAM(s) Oral daily  gabapentin 600 milliGRAM(s) Oral four times a day  atorvastatin 40 milliGRAM(s) Oral at bedtime  famotidine    Tablet 20 milliGRAM(s) Oral daily  pantoprazole    Tablet 40 milliGRAM(s) Oral before breakfast  dextrose 5%. 1000 milliLiter(s) (50 mL/Hr) IV Continuous <Continuous>  dextrose 50% Injectable 12.5 Gram(s) IV Push once  dextrose 50% Injectable 25 Gram(s) IV Push once  dextrose 50% Injectable 25 Gram(s) IV Push once  oxybutynin 10 milliGRAM(s) Oral three times a day  ALBUTerol/ipratropium for Nebulization 3 milliLiter(s) Nebulizer every 6 hours  clopidogrel Tablet 75 milliGRAM(s) Oral daily  aspirin enteric coated 81 milliGRAM(s) Oral daily  insulin lispro (HumaLOG) corrective regimen sliding scale   SubCutaneous three times a day before meals  insulin glargine Injectable (LANTUS) 40 Unit(s) SubCutaneous two times a day  insulin lispro Injectable (HumaLOG) 5 Unit(s) SubCutaneous three times a day before meals    MEDICATIONS  (PRN):  acetaminophen   Tablet. 650 milliGRAM(s) Oral every 6 hours PRN Mild Pain (1 - 3)  ALBUTerol    90 MICROgram(s) HFA Inhaler 2 Puff(s) Inhalation every 6 hours PRN Shortness of Breath and/or Wheezing  guaiFENesin    Syrup 100 milliGRAM(s) Oral every 6 hours PRN Cough  dextrose Gel 1 Dose(s) Oral once PRN Blood Glucose LESS THAN 70 milliGRAM(s)/deciLiter  glucagon  Injectable 1 milliGRAM(s) IntraMuscular once PRN Glucose <70 milliGRAM(s)/deciLiter  promethazine Syrup 5 milliGRAM(s) Oral every 6 hours PRN Cough  ondansetron Injectable 4 milliGRAM(s) IV Push every 4 hours PRN Nausea and/or Vomiting  melatonin 3 milliGRAM(s) Oral at bedtime PRN Insomnia    Vital Signs Last 24 Hrs  T(C): 36.6 (13 Aug 2017 04:57), Max: 36.8 (12 Aug 2017 11:32)  T(F): 97.9 (13 Aug 2017 04:57), Max: 98.2 (12 Aug 2017 11:32)  HR: 86 (13 Aug 2017 09:00) (84 - 93)  BP: 150/60 (13 Aug 2017 04:57) (119/63 - 150/60)  BP(mean): --  RR: 19 (13 Aug 2017 04:57) (18 - 19)  SpO2: 96% (13 Aug 2017 04:57) (96% - 97%)    RUQ tenderness to palpation.  hx abdominal hernia dx'd 5 months ago at [facility].  -CT AB/pelvis w/ PO contrast, r/o adhesions, obstruction, incarceration.  -Sx consult.    dysphagia.  hx GERD.  -questionable hx of esophageal stricture dx'd at [facility].  -difficulty eating.  -speech pathology input reviewed.  -08/11 barium esophagram, no evidence of masses or ulcers involving the esophagus but there is esophageal dysmotility and distal esophageal spasm.  -PPI.  -GI input noted.  EGD deferred.  GI f/u outpatient w/ Dr. Kaden Woodward.    NQWMI (type 2).  hx CAD-AMI + CABG.  -chest pain resolved.  -08/07 coronary angiogram nonobstructive CAD.  -c/w DAPT + ACEI + statin.    suspect viral bronchitis.  hx COPD.  -document room air O2 saturation at rest and upon ambulation to determine if patient still requires supplemental O2.  -resolving.  reports breathing has improved.  -compled azithromycin 5 of 5.  -DUO neb q6h + JOANN HFA q6h PRN.  -guaifenesin.    hx type 2 DM.  -uncontrolled.  -ADA.  -A1C 9.6%.  -FSBG reviwed.  -escalated lispro 5units sq qac.  -escalate Lantus 42units sq qAM + qPM.  -c/w correction coverage.    disposition.  -patient undomiciled and desires to return to [facility] ("where [facility] stuff [facility]"), but they will not accommodate O2 if required.  -trouble placing due to O2 requirement.    -clinically however, patient's breathing has significantly improved.  await repeat room air saturations at rest and upon ambulation.     -case managment following, re:  placement. coughing, CP    57F.  presenting to the  ED due to SOB and CP X1 day. Patient is homeless, resides in the shelter. In the ED, mildly elevated trops noted. Patient admitted to r/o ACS and was about to undergo cardiac cath on 8/4 but deferred in the setting of fever temp 102. Patient treated for viral bronchitis and given azithromycin. She underwent cath on 8/7 revealing nonobstructive CAD. During hospital course patient had 1:1 for closer monitoring after episode of confusion / depressed MS (work up negative) and refuses to part with personal bag in room.     PMHx:  COPD;  lung CA;  DM;  CAD-AMI + CABG.    08/09-trouble swallowing.  "I have a esophagus the size of a child and nothing goes down."    telemetry reviewed, NSR.    08/10-resting comfortably.  offers no new complaints.    08/11-questioning when she will leave and her requirement for oxygen.  breathing comfortably on O2 via NC.    08/12-breathin comfortable and unchanged.  c/o RUQ tenderness above known abdominal hernia she was told about 5 months ago by PMD.  feels that the, "hernia is pressing up on my lungs" making it more difficult to breath.  has not been further evaluated by a surgeon.  tenderness upon palpation.  no associated vomiting and has been tolerating diet.  hx open cholecystectomy, appendectomy and hysterectomy.    ROS:  (-) nausea.  (-) vomiting.  (+) RUQ tenderness over abdominal hernia.    general:  wf appearing older than stated age in no acute respiratory distress.  HEENT:  unremarkable.  neck:  supple.  lungs:  CTA.  no w/r/r.  heart:  RRR.  NS1S2.  no m/r/g.  abd:  (+) RUQ abdominal fullness.  mildly tender to palpation.  nonreducible.  ext:  no edema.  neuro:  A&Ox3.  nonfocal    MEDICATIONS  (STANDING):  heparin  Injectable 5000 Unit(s) SubCutaneous every 12 hours  lisinopril 2.5 milliGRAM(s) Oral daily  gabapentin 600 milliGRAM(s) Oral four times a day  atorvastatin 40 milliGRAM(s) Oral at bedtime  famotidine    Tablet 20 milliGRAM(s) Oral daily  pantoprazole    Tablet 40 milliGRAM(s) Oral before breakfast  dextrose 5%. 1000 milliLiter(s) (50 mL/Hr) IV Continuous <Continuous>  dextrose 50% Injectable 12.5 Gram(s) IV Push once  dextrose 50% Injectable 25 Gram(s) IV Push once  dextrose 50% Injectable 25 Gram(s) IV Push once  oxybutynin 10 milliGRAM(s) Oral three times a day  ALBUTerol/ipratropium for Nebulization 3 milliLiter(s) Nebulizer every 6 hours  clopidogrel Tablet 75 milliGRAM(s) Oral daily  aspirin enteric coated 81 milliGRAM(s) Oral daily  insulin lispro (HumaLOG) corrective regimen sliding scale   SubCutaneous three times a day before meals  insulin glargine Injectable (LANTUS) 40 Unit(s) SubCutaneous two times a day  insulin lispro Injectable (HumaLOG) 5 Unit(s) SubCutaneous three times a day before meals    MEDICATIONS  (PRN):  acetaminophen   Tablet. 650 milliGRAM(s) Oral every 6 hours PRN Mild Pain (1 - 3)  ALBUTerol    90 MICROgram(s) HFA Inhaler 2 Puff(s) Inhalation every 6 hours PRN Shortness of Breath and/or Wheezing  guaiFENesin    Syrup 100 milliGRAM(s) Oral every 6 hours PRN Cough  dextrose Gel 1 Dose(s) Oral once PRN Blood Glucose LESS THAN 70 milliGRAM(s)/deciLiter  glucagon  Injectable 1 milliGRAM(s) IntraMuscular once PRN Glucose <70 milliGRAM(s)/deciLiter  promethazine Syrup 5 milliGRAM(s) Oral every 6 hours PRN Cough  ondansetron Injectable 4 milliGRAM(s) IV Push every 4 hours PRN Nausea and/or Vomiting  melatonin 3 milliGRAM(s) Oral at bedtime PRN Insomnia    Vital Signs Last 24 Hrs  T(C): 36.6 (13 Aug 2017 04:57), Max: 36.8 (12 Aug 2017 11:32)  T(F): 97.9 (13 Aug 2017 04:57), Max: 98.2 (12 Aug 2017 11:32)  HR: 86 (13 Aug 2017 09:00) (84 - 93)  BP: 150/60 (13 Aug 2017 04:57) (119/63 - 150/60)  BP(mean): --  RR: 19 (13 Aug 2017 04:57) (18 - 19)  SpO2: 96% (13 Aug 2017 04:57) (96% - 97%)    RUQ tenderness to palpation.  hx abdominal hernia dx'd 5 months ago at [facility].  -CT AB/pelvis w/ PO contrast, r/o adhesions, obstruction, incarceration.  -Sx consult.    dysphagia.  hx GERD.  -questionable hx of esophageal stricture dx'd at [facility].  -difficulty eating.  -speech pathology input reviewed.  -08/11 barium esophagram, no evidence of masses or ulcers involving the esophagus but there is esophageal dysmotility and distal esophageal spasm.  -PPI.  -GI input noted.  EGD deferred.  GI f/u outpatient w/ Dr. Kaden Woodward.    NQWMI (type 2).  hx CAD-AMI + CABG.  -chest pain resolved.  -08/07 coronary angiogram nonobstructive CAD.  -c/w DAPT + ACEI + statin.    suspect viral bronchitis.  hx COPD.  -document room air O2 saturation at rest and upon ambulation to determine if patient still requires supplemental O2.  -resolving.  reports breathing has improved.  -compled azithromycin 5 of 5.  -DUO neb q6h + JOANN HFA q6h PRN.  -guaifenesin.    hx type 2 DM.  -uncontrolled.  -ADA.  -A1C 9.6%.  -FSBG reviwed.  -today, escalate Lantus 42units sq qAM + qPM.  -08/12 escalated lispro 5units sq qac.  -c/w correction coverage.    disposition.  -patient undomiciled and desires to return to [facility] ("where my stuff is"), but they will not accommodate O2 if required.  -trouble placing due to O2 requirement.    -clinically however, patient's breathing has significantly improved.  await repeat room air saturations at rest and upon ambulation.     -case managment following, re:  placement.

## 2017-08-13 NOTE — PROGRESS NOTE ADULT - SUBJECTIVE AND OBJECTIVE BOX
Patient is a 57y old  Female who presents with a chief complaint of chest pain (11 Aug 2017 15:19)      HPI:  Pt is a 56 y/o woman with pmhx COPD/emphysema, chronic cough,  lung cancer, DM, MI, CABG,  presenting to the  ED due to SOB and CP since yesterday.   She reports chest pain is non radiating and 9/10.   She states it  feels like "someone is sitting on her chest".  Pt reports resting and lying still, helps the pain.   She c/o nausea and  mild abd pain for which she reports pepcid usually helps.    In the ER, she received Duonebs, I L IVF. Also hycodan was given for a coughing fit.   She received ASA.  Troponin was positive .      danita po with soft diet, with mild dysphagia  mild cough  has some epig burning discomfort, inc with meals and is improving  denies RUQ pain today      Past Med/Surg Hx   COPD,   Emphysema,   Lung cancer,   DM,   CABG three vessel, MI    Fam Hx:  non-contrib to current adm (04 Aug 2017 02:33)      PAST MEDICAL & SURGICAL HISTORY:  Lung cancer  COPD (chronic obstructive pulmonary disease)  No significant past surgical history      MEDICATIONS  (STANDING):  heparin  Injectable 5000 Unit(s) SubCutaneous every 12 hours  lisinopril 2.5 milliGRAM(s) Oral daily  gabapentin 600 milliGRAM(s) Oral four times a day  atorvastatin 40 milliGRAM(s) Oral at bedtime  famotidine    Tablet 20 milliGRAM(s) Oral daily  pantoprazole    Tablet 40 milliGRAM(s) Oral before breakfast  dextrose 5%. 1000 milliLiter(s) (50 mL/Hr) IV Continuous <Continuous>  dextrose 50% Injectable 12.5 Gram(s) IV Push once  dextrose 50% Injectable 25 Gram(s) IV Push once  dextrose 50% Injectable 25 Gram(s) IV Push once  oxybutynin 10 milliGRAM(s) Oral three times a day  ALBUTerol/ipratropium for Nebulization 3 milliLiter(s) Nebulizer every 6 hours  clopidogrel Tablet 75 milliGRAM(s) Oral daily  aspirin enteric coated 81 milliGRAM(s) Oral daily  insulin lispro (HumaLOG) corrective regimen sliding scale   SubCutaneous three times a day before meals  insulin lispro Injectable (HumaLOG) 5 Unit(s) SubCutaneous three times a day before meals  insulin glargine Injectable (LANTUS) 42 Unit(s) SubCutaneous two times a day    MEDICATIONS  (PRN):  acetaminophen   Tablet. 650 milliGRAM(s) Oral every 6 hours PRN Mild Pain (1 - 3)  ALBUTerol    90 MICROgram(s) HFA Inhaler 2 Puff(s) Inhalation every 6 hours PRN Shortness of Breath and/or Wheezing  guaiFENesin    Syrup 100 milliGRAM(s) Oral every 6 hours PRN Cough  dextrose Gel 1 Dose(s) Oral once PRN Blood Glucose LESS THAN 70 milliGRAM(s)/deciLiter  glucagon  Injectable 1 milliGRAM(s) IntraMuscular once PRN Glucose <70 milliGRAM(s)/deciLiter  promethazine Syrup 5 milliGRAM(s) Oral every 6 hours PRN Cough  ondansetron Injectable 4 milliGRAM(s) IV Push every 4 hours PRN Nausea and/or Vomiting  melatonin 3 milliGRAM(s) Oral at bedtime PRN Insomnia      Allergies    Dilaudid (Unknown)    Intolerances        SOCIAL HISTORY:NC    FAMILY HISTORY:  No pertinent family history in first degree relatives      REVIEW OF SYSTEMS:    CONSTITUTIONAL: No weakness, fevers or chills  EYES/ENT: No visual changes;  No vertigo or throat pain   NECK: No pain or stiffness  RESPIRATORY: No cough, wheezing, hemoptysis; No shortness of breath  CARDIOVASCULAR: No chest pain or palpitations  GENITOURINARY: No dysuria, frequency or hematuria  NEUROLOGICAL: No numbness or weakness  SKIN: No itching, burning, rashes, or lesions   All other review of systems is negative unless indicated above.    Vital Signs Last 24 Hrs  T(C): 36.7 (13 Aug 2017 11:42), Max: 36.7 (13 Aug 2017 11:42)  T(F): 98 (13 Aug 2017 11:42), Max: 98 (13 Aug 2017 11:42)  HR: 90 (13 Aug 2017 11:42) (84 - 93)  BP: 97/55 (13 Aug 2017 11:42) (97/55 - 150/60)  BP(mean): --  RR: 20 (13 Aug 2017 11:42) (18 - 20)  SpO2: 96% (13 Aug 2017 11:42) (96% - 97%)    PHYSICAL EXAM:    Constitutional: NAD, well-developed  HEENT: EOMI, throat clear  Neck: No LAD, supple  Respiratory: CTA and P, prolonged exp phase  Cardiovascular: S1 and S2, RRR, no M  Gastrointestinal: BS+, soft, NT/ND, neg HSM,  Extremities: No peripheral edema, neg clubing, cyanosis  Vascular: 2+ peripheral pulses  Neurological: A/O x 3, no focal deficits  Psychiatric: Normal mood, normal affect  Skin: No rashes    LABS:reviewed                  RADIOLOGY & ADDITIONAL STUDIES:  < from: Xray Esophagram (08.11.17 @ 10:24) >  EXAM:  ESOPHOGRAM                            PROCEDURE DATE:  08/11/2017          INTERPRETATION:  Barium esophagram    Clinical history dysphasia    Findings: There are no prior studies available for comparison.     AP image of the chest shows postoperative changes from status post   sternotomy and likely coronary artery bypass graft. Lungs are clear.    Following ingestion of barium, the esophagus is opacified and is mildly   dilated.Tertiary contractions are visualized throughout the esophagus.   There was some delay in esophageal emptying, but the gastroesophageal   junction opened intermittently. There is no evidence of ulcers or masses   involving the esophagus or the gastric esophageal junction.A 10mm Barium   capsule did not pass through the gastro esophageal junction and was   retained in the distal esophagus[10 minutes of observation].     Impression: There is no evidence of masses or ulcers involving the   esophagus but there is esophageal dysmotility and distal esophageal spasm.    < end of copied text >

## 2017-08-14 LAB
ANION GAP SERPL CALC-SCNC: 7 MMOL/L — SIGNIFICANT CHANGE UP (ref 5–17)
BUN SERPL-MCNC: 44 MG/DL — HIGH (ref 7–23)
CALCIUM SERPL-MCNC: 9.2 MG/DL — SIGNIFICANT CHANGE UP (ref 8.5–10.1)
CHLORIDE SERPL-SCNC: 99 MMOL/L — SIGNIFICANT CHANGE UP (ref 96–108)
CO2 SERPL-SCNC: 29 MMOL/L — SIGNIFICANT CHANGE UP (ref 22–31)
CREAT SERPL-MCNC: 1.51 MG/DL — HIGH (ref 0.5–1.3)
GLUCOSE SERPL-MCNC: 211 MG/DL — HIGH (ref 70–99)
HCT VFR BLD CALC: 34.1 % — LOW (ref 34.5–45)
HGB BLD-MCNC: 11.6 G/DL — SIGNIFICANT CHANGE UP (ref 11.5–15.5)
MCHC RBC-ENTMCNC: 29.1 PG — SIGNIFICANT CHANGE UP (ref 27–34)
MCHC RBC-ENTMCNC: 33.9 GM/DL — SIGNIFICANT CHANGE UP (ref 32–36)
MCV RBC AUTO: 85.8 FL — SIGNIFICANT CHANGE UP (ref 80–100)
PLATELET # BLD AUTO: 256 K/UL — SIGNIFICANT CHANGE UP (ref 150–400)
POTASSIUM SERPL-MCNC: 5.3 MMOL/L — SIGNIFICANT CHANGE UP (ref 3.5–5.3)
POTASSIUM SERPL-SCNC: 5.3 MMOL/L — SIGNIFICANT CHANGE UP (ref 3.5–5.3)
RBC # BLD: 3.97 M/UL — SIGNIFICANT CHANGE UP (ref 3.8–5.2)
RBC # FLD: 13.6 % — SIGNIFICANT CHANGE UP (ref 10.3–14.5)
SODIUM SERPL-SCNC: 135 MMOL/L — SIGNIFICANT CHANGE UP (ref 135–145)
WBC # BLD: 8.9 K/UL — SIGNIFICANT CHANGE UP (ref 3.8–10.5)
WBC # FLD AUTO: 8.9 K/UL — SIGNIFICANT CHANGE UP (ref 3.8–10.5)

## 2017-08-14 RX ORDER — SODIUM CHLORIDE 9 MG/ML
1000 INJECTION INTRAMUSCULAR; INTRAVENOUS; SUBCUTANEOUS
Qty: 0 | Refills: 0 | Status: DISCONTINUED | OUTPATIENT
Start: 2017-08-14 | End: 2017-08-18

## 2017-08-14 RX ADMIN — Medication 10 MILLIGRAM(S): at 13:30

## 2017-08-14 RX ADMIN — Medication 2: at 08:17

## 2017-08-14 RX ADMIN — PANTOPRAZOLE SODIUM 40 MILLIGRAM(S): 20 TABLET, DELAYED RELEASE ORAL at 05:12

## 2017-08-14 RX ADMIN — Medication 5 UNIT(S): at 08:17

## 2017-08-14 RX ADMIN — Medication 5 UNIT(S): at 17:01

## 2017-08-14 RX ADMIN — Medication 81 MILLIGRAM(S): at 11:51

## 2017-08-14 RX ADMIN — LISINOPRIL 2.5 MILLIGRAM(S): 2.5 TABLET ORAL at 05:07

## 2017-08-14 RX ADMIN — GABAPENTIN 600 MILLIGRAM(S): 400 CAPSULE ORAL at 17:01

## 2017-08-14 RX ADMIN — SODIUM CHLORIDE 75 MILLILITER(S): 9 INJECTION INTRAMUSCULAR; INTRAVENOUS; SUBCUTANEOUS at 17:00

## 2017-08-14 RX ADMIN — CLOPIDOGREL BISULFATE 75 MILLIGRAM(S): 75 TABLET, FILM COATED ORAL at 11:51

## 2017-08-14 RX ADMIN — Medication 2: at 11:47

## 2017-08-14 RX ADMIN — Medication 1: at 17:01

## 2017-08-14 RX ADMIN — FAMOTIDINE 20 MILLIGRAM(S): 10 INJECTION INTRAVENOUS at 11:51

## 2017-08-14 RX ADMIN — Medication 5 MILLIGRAM(S): at 23:16

## 2017-08-14 RX ADMIN — ONDANSETRON 4 MILLIGRAM(S): 8 TABLET, FILM COATED ORAL at 05:06

## 2017-08-14 RX ADMIN — Medication 10 MILLIGRAM(S): at 05:07

## 2017-08-14 RX ADMIN — Medication 3 MILLILITER(S): at 08:16

## 2017-08-14 RX ADMIN — INSULIN GLARGINE 42 UNIT(S): 100 INJECTION, SOLUTION SUBCUTANEOUS at 21:59

## 2017-08-14 RX ADMIN — Medication 100 MILLIGRAM(S): at 14:54

## 2017-08-14 RX ADMIN — Medication 10 MILLIGRAM(S): at 21:59

## 2017-08-14 RX ADMIN — GABAPENTIN 600 MILLIGRAM(S): 400 CAPSULE ORAL at 05:07

## 2017-08-14 RX ADMIN — Medication 5 UNIT(S): at 11:47

## 2017-08-14 RX ADMIN — Medication 30 MILLILITER(S): at 05:06

## 2017-08-14 RX ADMIN — Medication 3 MILLILITER(S): at 19:39

## 2017-08-14 RX ADMIN — HEPARIN SODIUM 5000 UNIT(S): 5000 INJECTION INTRAVENOUS; SUBCUTANEOUS at 05:06

## 2017-08-14 RX ADMIN — INSULIN GLARGINE 42 UNIT(S): 100 INJECTION, SOLUTION SUBCUTANEOUS at 08:18

## 2017-08-14 RX ADMIN — Medication 3 MILLILITER(S): at 01:28

## 2017-08-14 RX ADMIN — Medication 3 MILLILITER(S): at 13:12

## 2017-08-14 RX ADMIN — GABAPENTIN 600 MILLIGRAM(S): 400 CAPSULE ORAL at 11:48

## 2017-08-14 RX ADMIN — GABAPENTIN 600 MILLIGRAM(S): 400 CAPSULE ORAL at 23:15

## 2017-08-14 RX ADMIN — HEPARIN SODIUM 5000 UNIT(S): 5000 INJECTION INTRAVENOUS; SUBCUTANEOUS at 17:03

## 2017-08-14 RX ADMIN — ATORVASTATIN CALCIUM 40 MILLIGRAM(S): 80 TABLET, FILM COATED ORAL at 21:59

## 2017-08-14 NOTE — PROVIDER CONTACT NOTE (OTHER) - NAME OF MD/NP/PA/DO NOTIFIED:
Consult for Dr. Atkins recalled; spoke with [place].
Dr Stout
Left message with Dr. Atkins's office for consult.
MARIA DE JESUS Singh

## 2017-08-14 NOTE — CONSULT NOTE ADULT - ASSESSMENT
57 y old females with COPD,  obesity has diastasis of recti, no definitive hernia, also found to have pelvic cysts    No surgical intervention for diastasis considering her COPD, smoking and obesity she is not a candidate for repair, diastasis is not a real hernia and routinely doesn't cause incarceration, does not  need intervention other then cosmetic purposes she is advised to use abdominal binder, quit t smoking and losings weight will help regain some abdominal wall strength    GYN consult for Pelvic cyst.    Medical care per primary service.

## 2017-08-14 NOTE — PROGRESS NOTE ADULT - SUBJECTIVE AND OBJECTIVE BOX
CC: coughing, CP    HPI: 58yo female.  presenting to the  ED due to SOB and CP X1 day. Patient is homeless, resides in the shelter. In the ED, mildly elevated trops noted. Patient admitted to r/o ACS and was about to undergo cardiac cath on 8/4 but deferred in the setting of fever temp 102. Patient treated for viral bronchitis and given azithromycin. She underwent cath on 8/7 revealing nonobstructive CAD. During hospital course patient had 1:1 for closer monitoring after episode of confusion / depressed MS (work up negative) and refuses to part with personal bag in room.     PMHx:  COPD;  lung CA;  DM;  CAD-AMI + CABG.    8/14/17: Pt seen and examined. No new complaints. O2 on Ambulation today 87%. No further abd pain today. discharge planning in progress. unable to go back to [facility] shelter with oxygen, pending placement in [facility].    REVIEW OF SYSTEMS:  General: NAD, hemodynamically stable, (-)  fever, (-) chills, (-) weakness  HEENT:  Eyes:  No visual loss, blurred vision, double vision or yellow sclerae. Ears, Nose, Throat:  No hearing loss, sneezing, congestion, runny nose or sore throat.  SKIN:  No rash or itching.  CARDIOVASCULAR:  No chest pain, chest pressure or chest discomfort. No palpitations or edema.  RESPIRATORY:  No shortness of breath, cough or sputum.  GASTROINTESTINAL:  No anorexia, nausea, vomiting or diarrhea. No abdominal pain or blood.  NEUROLOGICAL:  No headache, dizziness, syncope, paralysis, ataxia, numbness or tingling in the extremities. No change in bowel or bladder control.  MUSCULOSKELETAL:  No muscle, back pain, joint pain or stiffness.  HEMATOLOGIC:  No anemia, bleeding or bruising.  LYMPHATICS:  No enlarged nodes. No history of splenectomy.  ENDOCRINOLOGIC:  No reports of sweating, cold or heat intolerance. No polyuria or polydipsia.  ALLERGIES:  No history of asthma, hives, eczema or rhinitis.    MEDICATIONS  (STANDING):  heparin  Injectable 5000 Unit(s) SubCutaneous every 12 hours  lisinopril 2.5 milliGRAM(s) Oral daily  gabapentin 600 milliGRAM(s) Oral four times a day  atorvastatin 40 milliGRAM(s) Oral at bedtime  famotidine    Tablet 20 milliGRAM(s) Oral daily  pantoprazole    Tablet 40 milliGRAM(s) Oral before breakfast  dextrose 5%. 1000 milliLiter(s) (50 mL/Hr) IV Continuous <Continuous>  dextrose 50% Injectable 12.5 Gram(s) IV Push once  dextrose 50% Injectable 25 Gram(s) IV Push once  dextrose 50% Injectable 25 Gram(s) IV Push once  oxybutynin 10 milliGRAM(s) Oral three times a day  ALBUTerol/ipratropium for Nebulization 3 milliLiter(s) Nebulizer every 6 hours  clopidogrel Tablet 75 milliGRAM(s) Oral daily  aspirin enteric coated 81 milliGRAM(s) Oral daily  insulin lispro (HumaLOG) corrective regimen sliding scale   SubCutaneous three times a day before meals  insulin lispro Injectable (HumaLOG) 5 Unit(s) SubCutaneous three times a day before meals  insulin glargine Injectable (LANTUS) 42 Unit(s) SubCutaneous two times a day    MEDICATIONS  (PRN):  acetaminophen   Tablet. 650 milliGRAM(s) Oral every 6 hours PRN Mild Pain (1 - 3)  ALBUTerol    90 MICROgram(s) HFA Inhaler 2 Puff(s) Inhalation every 6 hours PRN Shortness of Breath and/or Wheezing  guaiFENesin    Syrup 100 milliGRAM(s) Oral every 6 hours PRN Cough  dextrose Gel 1 Dose(s) Oral once PRN Blood Glucose LESS THAN 70 milliGRAM(s)/deciLiter  glucagon  Injectable 1 milliGRAM(s) IntraMuscular once PRN Glucose <70 milliGRAM(s)/deciLiter  promethazine Syrup 5 milliGRAM(s) Oral every 6 hours PRN Cough  ondansetron Injectable 4 milliGRAM(s) IV Push every 4 hours PRN Nausea and/or Vomiting  melatonin 3 milliGRAM(s) Oral at bedtime PRN Insomnia      Vital Signs Last 24 Hrs  T(C): 37 (14 Aug 2017 11:07), Max: 37 (14 Aug 2017 11:07)  T(F): 98.6 (14 Aug 2017 11:07), Max: 98.6 (14 Aug 2017 11:07)  HR: 86 (14 Aug 2017 13:13) (86 - 96)  BP: 114/59 (14 Aug 2017 11:07) (114/59 - 124/87)  BP(mean): --  RR: 20 (14 Aug 2017 11:07) (19 - 20)  SpO2: 96% (14 Aug 2017 11:07) (94% - 99%)    general:  appearing older than stated age in no acute respiratory distress.  HEENT:  unremarkable.  neck:  supple.  lungs:  CTA.  no w/r/r.  heart:  RRR.  NS1S2.  no m/r/g.  abd:  mildly tender to palpation.  nonreducible.  ext:  no edema.  neuro:  A&Ox3.  nonfocal    Labs                          11.6   8.9   )-----------( 256      ( 14 Aug 2017 06:20 )             34.1     14 Aug 2017 06:20    135    |  99     |  44     ----------------------------<  211    5.3     |  29     |  1.51     Ca    9.2        14 Aug 2017 06:20        CAPILLARY BLOOD GLUCOSE  215 (13 Aug 2017 21:50)  100 (13 Aug 2017 16:14)

## 2017-08-14 NOTE — CONSULT NOTE ADULT - SUBJECTIVE AND OBJECTIVE BOX
Patient is a 57y old  Female who presents with a chief complaint of chest pain (11 Aug 2017 15:19)    HPI:  Pt is a 56 y/o woman with pmhx COPD/emphysema, chronic cough,  lung cancer, DM, MI, CABG,  presenting to the  ED due to SOB and CP, being treated for her COPD, also had elevated troponin, surgery was consulted for abdominal bugle o exertion and coughing,, passing gas, had a BM yesterday, has off and nausea no vomiting, H2 blockers helps with her upper abdominal pain. No symptoms of SBO.Tolerating diet.    Past Med/Surg Hx   COPD,   Emphysema,   Lung cancer,   DM,   CABG three vessel, MI  COPD (chronic obstructive pulmonary disease)  No significant past surgical history    FAMILY HISTORY:  No pertinent family history in first degree relatives      Alochol: Denied  Smoking: smoker  Drug Use: Denied        Allergies    Dilaudid (Unknown)    Intolerances      MEDICATIONS  (STANDING):  heparin  Injectable 5000 Unit(s) SubCutaneous every 12 hours  lisinopril 2.5 milliGRAM(s) Oral daily  gabapentin 600 milliGRAM(s) Oral four times a day  atorvastatin 40 milliGRAM(s) Oral at bedtime  famotidine    Tablet 20 milliGRAM(s) Oral daily  pantoprazole    Tablet 40 milliGRAM(s) Oral before breakfast  dextrose 5%. 1000 milliLiter(s) (50 mL/Hr) IV Continuous <Continuous>  dextrose 50% Injectable 12.5 Gram(s) IV Push once  dextrose 50% Injectable 25 Gram(s) IV Push once  dextrose 50% Injectable 25 Gram(s) IV Push once  oxybutynin 10 milliGRAM(s) Oral three times a day  ALBUTerol/ipratropium for Nebulization 3 milliLiter(s) Nebulizer every 6 hours  clopidogrel Tablet 75 milliGRAM(s) Oral daily  aspirin enteric coated 81 milliGRAM(s) Oral daily  insulin lispro (HumaLOG) corrective regimen sliding scale   SubCutaneous three times a day before meals  insulin lispro Injectable (HumaLOG) 5 Unit(s) SubCutaneous three times a day before meals  insulin glargine Injectable (LANTUS) 42 Unit(s) SubCutaneous two times a day  sodium chloride 0.9%. 1000 milliLiter(s) (75 mL/Hr) IV Continuous <Continuous>    Vital Signs Last 24 Hrs  T(C): 36.7 (15 Aug 2017 05:28), Max: 37 (14 Aug 2017 11:07)  T(F): 98.1 (15 Aug 2017 05:28), Max: 98.6 (14 Aug 2017 11:07)  HR: 80 (15 Aug 2017 05:28) (80 - 96)  BP: 133/47 (15 Aug 2017 05:28) (114/59 - 138/45)  BP(mean): --  RR: 18 (15 Aug 2017 05:28) (18 - 20)  SpO2: 99% (15 Aug 2017 05:28) (92% - 99%)  PHYSICAL EXAM:      Constitutional: NAD    General:  AOx3    Respiratory:  CTABL, B/L occasional  ronchil on O2    Cardiovascular: S1+S2+0    Gastrointestinal : Abdomen soft, obese non distended, NT, wide bulge on exertion, no skin changes, has ecchymosis from Heparin, no incarcerated hernias.  Extremities: NV intact    Neurological: No focal deficit.        Labs:                          11.6   8.9   )-----------( 256      ( 14 Aug 2017 06:20 )             34.1       08-15    137  |  101  |  46<H>  ----------------------------<  237<H>  4.8   |  28  |  1.19    Ca    8.8      15 Aug 2017 05:38            Radiology Results:    CT Abdomen Pelvis:    < from: CT Abdomen and Pelvis w/ Oral Cont (08.13.17 @ 15:12) >    IMPRESSION:     Rectus diastases without a discrete abdominal wall hernia.    10 cm pelvic cyst, likely arising from the left ovary. Follow-up   nonemergent MRI pelvis with and without contrast for complete evaluation.    Right lower lobe tree-in-bud opacities and a small area of atelectasis or   consolidation. Correlate for pneumonia.      < end of copied text >

## 2017-08-14 NOTE — PROGRESS NOTE ADULT - ASSESSMENT
58yo female presenting to the  ED due to SOB and CP X1 day.     * RUQ tenderness to palpation.    - now improved  - hx abdominal hernia dx'd 5 months ago at [place].  - CT AB/pelvis  - Sx consult pending    * dysphagia.  hx GERD.  - questionable hx of esophageal stricture dx'd at [place].  - speech pathology input reviewed.  - 08/11 barium esophagram, no evidence of masses or ulcers involving the esophagus but there is esophageal dysmotility and distal esophageal spasm.  - PPI.  - GI input noted.  EGD deferred.  GI f/u outpatient w/ Dr. Kaden Woodward.    * NQWMI (type 2).  hx CAD-AMI + CABG.  - chest pain resolved.  - 08/07 coronary angiogram nonobstructive CAD.  - c/w DAPT + ACEI + statin.    * suspected viral bronchitis.  hx COPD.  -O2 on ambulation 87%, requires home O2  -resolving.  reports breathing has improved.  -completed azithromycin 5 of 5days  -DUO neb q6h + JOANN HFA q6h PRN.  -guaifenesin.    h* x type 2 DM.  - uncontrolled.  - ADA.  - A1C 9.6%.  - basal bolus insulin regimen    disposition.  -patient undomiciled and desires to return to [facility] ("where my stuff [facility]"), but they will not accommodate O2. pt now agreeable to [facility], pending placement  -trouble placing due to O2 requirement.    -case management following, re:  placement. 58yo female presenting to the  ED due to SOB and CP X1 day.     * RUQ tenderness to palpation.    - now improved  - hx abdominal hernia dx'd 5 months ago at [place].  - CT AB/pelvis  - Sx consult pending    * dysphagia.  hx GERD.  - questionable hx of esophageal stricture dx'd at [place].  - speech pathology input reviewed.  - 08/11 barium esophagram, no evidence of masses or ulcers involving the esophagus but there is esophageal dysmotility and distal esophageal spasm.  - PPI.  - GI input noted.  EGD deferred.  GI f/u outpatient w/ Dr. Kaden Woodward.    * NQWMI (type 2).  hx CAD-AMI + CABG.  - chest pain resolved.  - 08/07 coronary angiogram nonobstructive CAD.  - c/w DAPT + ACEI + statin.    * suspected viral bronchitis.  hx COPD.  -O2 on ambulation 87%, requires home O2  -resolving.  reports breathing has improved.  -completed azithromycin 5 of 5days  -DUO neb q6h + JOANN HFA q6h PRN.  -guaifenesin.    * hx type 2 DM.  - uncontrolled.  - ADA.  - A1C 9.6%.  - basal bolus insulin regimen    *MORELIA  - gentle IV fluid hydration  - repeat renal panel in AM    disposition.  -patient undomiciled and desires to return to [facility] ("where my stuff is"), but they will not accommodate O2. pt now agreeable to [facility], pending placement  -trouble placing due to O2 requirement.    -case management following, re:  placement. 58yo female presenting to the  ED due to SOB and CP X1 day.     * RUQ tenderness to palpation.    - now improved  - hx abdominal hernia dx'd 5 months ago at [place].  - CT AB/pelvis  - Sx consult pending    * dysphagia.  hx GERD.  - questionable hx of esophageal stricture dx'd at [place].  - speech pathology input reviewed.  - 08/11 barium esophagram, no evidence of masses or ulcers involving the esophagus but there is esophageal dysmotility and distal esophageal spasm.  - PPI.  - GI input noted.  EGD deferred.  GI f/u outpatient w/ Dr. Kaden Woodward.    * NQWMI (type 2).  hx CAD-AMI + CABG.  - chest pain resolved.  - 08/07 coronary angiogram nonobstructive CAD.  - c/w DAPT + ACEI + statin.    * suspected viral bronchitis.  hx COPD.  -O2 on ambulation 87%, requires home O2  -resolving.  reports breathing has improved.  -completed azithromycin 5 of 5days  -DUO neb q6h + JOANN HFA q6h PRN.  -guaifenesin.    * hx type 2 DM.  - uncontrolled.  - ADA.  - A1C 9.6%.  - basal bolus insulin regimen    *MORELIA  - gentle IV fluid hydration  - repeat renal panel in AM    *incidental finding on 10cm pelvic cyst  - outpatient gyn f/u and possible MRI    disposition.  -patient undomiciled and desires to return to [facility] ("where my stuff is"), but they will not accommodate O2. pt now agreeable to [facility], pending placement  -trouble placing due to O2 requirement.    -case management following, re:  placement.

## 2017-08-14 NOTE — PROGRESS NOTE ADULT - ASSESSMENT
Imp:  Dysphagia and distal esophageal hold up -- achalasia vs. stricture    Rec:  Happy to arrange EGD with possible botox and possilbe dilation but cannot perform while on asa and plavix  Consider d/c of one or other for at least 3 days and then can pursue, otherwise would defer to outpatient

## 2017-08-14 NOTE — PROGRESS NOTE ADULT - SUBJECTIVE AND OBJECTIVE BOX
Patient is a 57y old  Female who presents with a chief complaint of chest pain (11 Aug 2017 15:19)      Subective:  Continues to note swallowing isuues    PAST MEDICAL & SURGICAL HISTORY:  Lung cancer  COPD (chronic obstructive pulmonary disease)  CABG      MEDICATIONS  (STANDING):  heparin  Injectable 5000 Unit(s) SubCutaneous every 12 hours  lisinopril 2.5 milliGRAM(s) Oral daily  gabapentin 600 milliGRAM(s) Oral four times a day  atorvastatin 40 milliGRAM(s) Oral at bedtime  famotidine    Tablet 20 milliGRAM(s) Oral daily  pantoprazole    Tablet 40 milliGRAM(s) Oral before breakfast  dextrose 5%. 1000 milliLiter(s) (50 mL/Hr) IV Continuous <Continuous>  dextrose 50% Injectable 12.5 Gram(s) IV Push once  dextrose 50% Injectable 25 Gram(s) IV Push once  dextrose 50% Injectable 25 Gram(s) IV Push once  oxybutynin 10 milliGRAM(s) Oral three times a day  ALBUTerol/ipratropium for Nebulization 3 milliLiter(s) Nebulizer every 6 hours  clopidogrel Tablet 75 milliGRAM(s) Oral daily  aspirin enteric coated 81 milliGRAM(s) Oral daily  insulin lispro (HumaLOG) corrective regimen sliding scale   SubCutaneous three times a day before meals  insulin lispro Injectable (HumaLOG) 5 Unit(s) SubCutaneous three times a day before meals  insulin glargine Injectable (LANTUS) 42 Unit(s) SubCutaneous two times a day  sodium chloride 0.9%. 1000 milliLiter(s) (75 mL/Hr) IV Continuous <Continuous>    MEDICATIONS  (PRN):  acetaminophen   Tablet. 650 milliGRAM(s) Oral every 6 hours PRN Mild Pain (1 - 3)  ALBUTerol    90 MICROgram(s) HFA Inhaler 2 Puff(s) Inhalation every 6 hours PRN Shortness of Breath and/or Wheezing  guaiFENesin    Syrup 100 milliGRAM(s) Oral every 6 hours PRN Cough  dextrose Gel 1 Dose(s) Oral once PRN Blood Glucose LESS THAN 70 milliGRAM(s)/deciLiter  glucagon  Injectable 1 milliGRAM(s) IntraMuscular once PRN Glucose <70 milliGRAM(s)/deciLiter  promethazine Syrup 5 milliGRAM(s) Oral every 6 hours PRN Cough  ondansetron Injectable 4 milliGRAM(s) IV Push every 4 hours PRN Nausea and/or Vomiting  melatonin 3 milliGRAM(s) Oral at bedtime PRN Insomnia      REVIEW OF SYSTEMS:    RESPIRATORY: No shortness of breath  CARDIOVASCULAR: No chest pain  All other review of systems is negative unless indicated above.    Vital Signs Last 24 Hrs  T(C): 37 (14 Aug 2017 11:07), Max: 37 (14 Aug 2017 11:07)  T(F): 98.6 (14 Aug 2017 11:07), Max: 98.6 (14 Aug 2017 11:07)  HR: 86 (14 Aug 2017 13:13) (86 - 96)  BP: 114/59 (14 Aug 2017 11:07) (114/59 - 124/87)  BP(mean): --  RR: 20 (14 Aug 2017 11:07) (19 - 20)  SpO2: 96% (14 Aug 2017 11:07) (94% - 99%)    PHYSICAL EXAM:    Constitutional: NAD, well-developed  Respiratory: CTAB, on O2  Cardiovascular: S1 and S2, RRR  Gastrointestinal: BS+, soft, NT/ND  Extremities: No peripheral edema  Psychiatric: Normal mood, normal affect    LABS:                        11.6   8.9   )-----------( 256      ( 14 Aug 2017 06:20 )             34.1     08-14    135  |  99  |  44<H>  ----------------------------<  211<H>  5.3   |  29  |  1.51<H>    Ca    9.2      14 Aug 2017 06:20            RADIOLOGY & ADDITIONAL STUDIES:

## 2017-08-15 LAB
ANION GAP SERPL CALC-SCNC: 8 MMOL/L — SIGNIFICANT CHANGE UP (ref 5–17)
BUN SERPL-MCNC: 46 MG/DL — HIGH (ref 7–23)
CALCIUM SERPL-MCNC: 8.8 MG/DL — SIGNIFICANT CHANGE UP (ref 8.5–10.1)
CHLORIDE SERPL-SCNC: 101 MMOL/L — SIGNIFICANT CHANGE UP (ref 96–108)
CO2 SERPL-SCNC: 28 MMOL/L — SIGNIFICANT CHANGE UP (ref 22–31)
CREAT SERPL-MCNC: 1.19 MG/DL — SIGNIFICANT CHANGE UP (ref 0.5–1.3)
GLUCOSE SERPL-MCNC: 237 MG/DL — HIGH (ref 70–99)
POTASSIUM SERPL-MCNC: 4.8 MMOL/L — SIGNIFICANT CHANGE UP (ref 3.5–5.3)
POTASSIUM SERPL-SCNC: 4.8 MMOL/L — SIGNIFICANT CHANGE UP (ref 3.5–5.3)
SODIUM SERPL-SCNC: 137 MMOL/L — SIGNIFICANT CHANGE UP (ref 135–145)

## 2017-08-15 RX ORDER — OXYCODONE AND ACETAMINOPHEN 5; 325 MG/1; MG/1
1 TABLET ORAL EVERY 6 HOURS
Qty: 0 | Refills: 0 | Status: DISCONTINUED | OUTPATIENT
Start: 2017-08-15 | End: 2017-08-18

## 2017-08-15 RX ORDER — METOCLOPRAMIDE HCL 10 MG
5 TABLET ORAL
Qty: 0 | Refills: 0 | Status: DISCONTINUED | OUTPATIENT
Start: 2017-08-15 | End: 2017-08-18

## 2017-08-15 RX ORDER — INSULIN GLARGINE 100 [IU]/ML
50 INJECTION, SOLUTION SUBCUTANEOUS
Qty: 0 | Refills: 0 | Status: DISCONTINUED | OUTPATIENT
Start: 2017-08-15 | End: 2017-08-18

## 2017-08-15 RX ADMIN — Medication 5 MILLIGRAM(S): at 06:38

## 2017-08-15 RX ADMIN — FAMOTIDINE 20 MILLIGRAM(S): 10 INJECTION INTRAVENOUS at 12:14

## 2017-08-15 RX ADMIN — Medication 10 MILLIGRAM(S): at 06:37

## 2017-08-15 RX ADMIN — Medication 5 MILLIGRAM(S): at 17:42

## 2017-08-15 RX ADMIN — ONDANSETRON 4 MILLIGRAM(S): 8 TABLET, FILM COATED ORAL at 06:37

## 2017-08-15 RX ADMIN — Medication 1: at 17:45

## 2017-08-15 RX ADMIN — Medication 81 MILLIGRAM(S): at 12:13

## 2017-08-15 RX ADMIN — ATORVASTATIN CALCIUM 40 MILLIGRAM(S): 80 TABLET, FILM COATED ORAL at 21:56

## 2017-08-15 RX ADMIN — GABAPENTIN 600 MILLIGRAM(S): 400 CAPSULE ORAL at 12:13

## 2017-08-15 RX ADMIN — Medication 5 UNIT(S): at 12:13

## 2017-08-15 RX ADMIN — GABAPENTIN 600 MILLIGRAM(S): 400 CAPSULE ORAL at 23:04

## 2017-08-15 RX ADMIN — LISINOPRIL 2.5 MILLIGRAM(S): 2.5 TABLET ORAL at 06:37

## 2017-08-15 RX ADMIN — Medication 3 MILLILITER(S): at 09:30

## 2017-08-15 RX ADMIN — ONDANSETRON 4 MILLIGRAM(S): 8 TABLET, FILM COATED ORAL at 13:35

## 2017-08-15 RX ADMIN — Medication 3 MILLILITER(S): at 02:43

## 2017-08-15 RX ADMIN — OXYCODONE AND ACETAMINOPHEN 1 TABLET(S): 5; 325 TABLET ORAL at 13:30

## 2017-08-15 RX ADMIN — Medication 5 UNIT(S): at 17:43

## 2017-08-15 RX ADMIN — Medication 5 MILLIGRAM(S): at 23:06

## 2017-08-15 RX ADMIN — Medication 2: at 08:49

## 2017-08-15 RX ADMIN — GABAPENTIN 600 MILLIGRAM(S): 400 CAPSULE ORAL at 17:42

## 2017-08-15 RX ADMIN — INSULIN GLARGINE 50 UNIT(S): 100 INJECTION, SOLUTION SUBCUTANEOUS at 21:51

## 2017-08-15 RX ADMIN — Medication 10 MILLIGRAM(S): at 13:30

## 2017-08-15 RX ADMIN — GABAPENTIN 600 MILLIGRAM(S): 400 CAPSULE ORAL at 06:37

## 2017-08-15 RX ADMIN — Medication 3 MILLILITER(S): at 20:14

## 2017-08-15 RX ADMIN — HEPARIN SODIUM 5000 UNIT(S): 5000 INJECTION INTRAVENOUS; SUBCUTANEOUS at 17:42

## 2017-08-15 RX ADMIN — PANTOPRAZOLE SODIUM 40 MILLIGRAM(S): 20 TABLET, DELAYED RELEASE ORAL at 06:37

## 2017-08-15 RX ADMIN — CLOPIDOGREL BISULFATE 75 MILLIGRAM(S): 75 TABLET, FILM COATED ORAL at 12:13

## 2017-08-15 RX ADMIN — Medication 5 MILLIGRAM(S): at 12:13

## 2017-08-15 RX ADMIN — Medication 10 MILLIGRAM(S): at 21:51

## 2017-08-15 RX ADMIN — INSULIN GLARGINE 42 UNIT(S): 100 INJECTION, SOLUTION SUBCUTANEOUS at 08:50

## 2017-08-15 RX ADMIN — Medication 5 MILLIGRAM(S): at 21:51

## 2017-08-15 RX ADMIN — HEPARIN SODIUM 5000 UNIT(S): 5000 INJECTION INTRAVENOUS; SUBCUTANEOUS at 06:37

## 2017-08-15 RX ADMIN — Medication 5 UNIT(S): at 08:49

## 2017-08-15 RX ADMIN — Medication 100 MILLIGRAM(S): at 12:12

## 2017-08-15 NOTE — PROGRESS NOTE ADULT - ASSESSMENT
56yo female presenting to the  ED due to SOB and CP X1 day.     * RUQ tenderness to palpation.    - now improved  - hx abdominal hernia dx'd 5 months ago at [place].  - CT AB/pelvis  - Sx consult for diastasis of recti-  not a candidate for surgical intervention      * dysphagia.  hx GERD.  - questionable hx of esophageal stricture dx'd at [place].  - speech pathology input reviewed.  - 08/11 barium esophagram, no evidence of masses or ulcers involving the esophagus but there is esophageal dysmotility and distal esophageal spasm.  - PPI.  - GI input noted.  EGD deferred.  GI f/u outpatient w/ Dr. Kaden Woodward.  - start reglan    * NQWMI (type 2).  hx CAD-AMI + CABG.  - chest pain resolved.  - 08/07 coronary angiogram nonobstructive CAD.  - c/w DAPT + ACEI + statin.    * suspected viral bronchitis.  hx COPD.  -O2 on ambulation 87%, requires home O2  -resolving.  reports breathing has improved.  -completed azithromycin 5 of 5days  -DUO neb q6h + JOANN HFA q6h PRN.  -guaifenesin.    * hx type 2 DM.  - uncontrolled.  - ADA.  - A1C 9.6%.  - basal bolus insulin regimen    *MORELIA  - gentle IV fluid hydration  - repeat renal panel in AM    *incidental finding on 10cm pelvic cyst  - outpatient gyn f/u and possible MRI    disposition.  -patient undomiciled and desires to return to [facility] ("where my stuff [facility]"), but they will not accommodate O2. pt now agreeable to [facility], pending placement and insurance verification with CM  -case management following, re:  placement  - patient is medically optimized for discharge. .

## 2017-08-15 NOTE — PROGRESS NOTE ADULT - SUBJECTIVE AND OBJECTIVE BOX
· Assessment	  58yo female presenting to the  ED due to SOB and CP X1 day.     * RUQ tenderness to palpation.    - now improved  - hx abdominal hernia dx'd 5 months ago at [place].  - CT AB/pelvis  - Sx consult pending    * dysphagia.  hx GERD.  - questionable hx of esophageal stricture dx'd at [place].  - speech pathology input reviewed.  - 08/11 barium esophagram, no evidence of masses or ulcers involving the esophagus but there is esophageal dysmotility and distal esophageal spasm.  - PPI.  - GI input noted.  EGD deferred.  GI f/u outpatient w/ Dr. Kaden Woodward.    * NQWMI (type 2).  hx CAD-AMI + CABG.  - chest pain resolved.  - 08/07 coronary angiogram nonobstructive CAD.  - c/w DAPT + ACEI + statin.    * suspected viral bronchitis.  hx COPD.  -O2 on ambulation 87%, requires home O2  -resolving.  reports breathing has improved.  -completed azithromycin 5 of 5days  -DUO neb q6h + JOANN HFA q6h PRN.  -guaifenesin.    * hx type 2 DM.  - uncontrolled.  - ADA.  - A1C 9.6%.  - basal bolus insulin regimen    *MORELIA  - gentle IV fluid hydration  - repeat renal panel in AM    *incidental finding on 10cm pelvic cyst  - outpatient gyn f/u and possible MRI    disposition.  -patient undomiciled and desires to return to [facility] ("where my stuff [facility]"), but they will not accommodate O2. pt now agreeable to [facility], pending placement  -trouble placing due to O2 requirement.    -case management following, re:  placement.

## 2017-08-15 NOTE — PROGRESS NOTE ADULT - SUBJECTIVE AND OBJECTIVE BOX
CC: coughing, CP    HPI: 56yo female.  presenting to the  ED due to SOB and CP X1 day. Patient is homeless, resides in the shelter. In the ED, mildly elevated trops noted. Patient admitted to r/o ACS and was about to undergo cardiac cath on 8/4 but deferred in the setting of fever temp 102. Patient treated for viral bronchitis and given azithromycin. She underwent cath on 8/7 revealing nonobstructive CAD. During hospital course patient had 1:1 for closer monitoring after episode of confusion / depressed MS (work up negative) and refuses to part with personal bag in room.     PMHx:  COPD;  lung CA;  DM;  CAD-AMI + CABG.    8/15- laying in bed - in no distress- complaining of feeling nauseous -  as per patient she used to take Reglan 4 times a day    ROS:   All 10 systems reviewed and found to be negative with the exception of what has been described above.    Vital Signs Last 24 Hrs  T(C): 36.4 (15 Aug 2017 13:09), Max: 36.8 (14 Aug 2017 20:43)  T(F): 97.6 (15 Aug 2017 13:09), Max: 98.3 (14 Aug 2017 20:43)  HR: 80 (15 Aug 2017 13:09) (80 - 93)  BP: 100/47 (15 Aug 2017 13:09) (100/47 - 138/45)  BP(mean): --  RR: 17 (15 Aug 2017 13:09) (17 - 20)  SpO2: 92% (15 Aug 2017 13:09) (92% - 99%)    PE:  Constitutional: NAD, laying in bed  HEENT: NC/AT, EOMI, PERRLA  Neck: supple  Back: no tenderness  Respiratory: LCTA  Cardiovascular: S1S2 regular, no murmurs  Abdomen: soft, not tender, not distended, positive BS  Genitourinary: voiding  Rectal: deferred  Musculoskeletal: no muscle tenderness, no joint swelling or tenderness  Extremities: no pedal edema   Neurological: no focal deficits      MEDICATIONS  (STANDING):  heparin  Injectable 5000 Unit(s) SubCutaneous every 12 hours  lisinopril 2.5 milliGRAM(s) Oral daily  gabapentin 600 milliGRAM(s) Oral four times a day  atorvastatin 40 milliGRAM(s) Oral at bedtime  famotidine    Tablet 20 milliGRAM(s) Oral daily  pantoprazole    Tablet 40 milliGRAM(s) Oral before breakfast  dextrose 5%. 1000 milliLiter(s) (50 mL/Hr) IV Continuous <Continuous>  dextrose 50% Injectable 12.5 Gram(s) IV Push once  dextrose 50% Injectable 25 Gram(s) IV Push once  dextrose 50% Injectable 25 Gram(s) IV Push once  oxybutynin 10 milliGRAM(s) Oral three times a day  ALBUTerol/ipratropium for Nebulization 3 milliLiter(s) Nebulizer every 6 hours  clopidogrel Tablet 75 milliGRAM(s) Oral daily  aspirin enteric coated 81 milliGRAM(s) Oral daily  insulin lispro (HumaLOG) corrective regimen sliding scale   SubCutaneous three times a day before meals  insulin lispro Injectable (HumaLOG) 5 Unit(s) SubCutaneous three times a day before meals  sodium chloride 0.9%. 1000 milliLiter(s) (75 mL/Hr) IV Continuous <Continuous>  metoclopramide 5 milliGRAM(s) Oral Before meals and at bedtime  insulin glargine Injectable (LANTUS) 50 Unit(s) SubCutaneous two times a day    MEDICATIONS  (PRN):  acetaminophen   Tablet. 650 milliGRAM(s) Oral every 6 hours PRN Mild Pain (1 - 3)  ALBUTerol    90 MICROgram(s) HFA Inhaler 2 Puff(s) Inhalation every 6 hours PRN Shortness of Breath and/or Wheezing  guaiFENesin    Syrup 100 milliGRAM(s) Oral every 6 hours PRN Cough  dextrose Gel 1 Dose(s) Oral once PRN Blood Glucose LESS THAN 70 milliGRAM(s)/deciLiter  glucagon  Injectable 1 milliGRAM(s) IntraMuscular once PRN Glucose <70 milliGRAM(s)/deciLiter  promethazine Syrup 5 milliGRAM(s) Oral every 6 hours PRN Cough  ondansetron Injectable 4 milliGRAM(s) IV Push every 4 hours PRN Nausea and/or Vomiting  melatonin 3 milliGRAM(s) Oral at bedtime PRN Insomnia  oxyCODONE    5 mG/acetaminophen 325 mG 1 Tablet(s) Oral every 6 hours PRN Severe Pain (7 - 10)                                         11.6   8.9   )-----------( 256      ( 14 Aug 2017 06:20 )             34.1     14 Aug 2017 06:20    135    |  99     |  44     ----------------------------<  211    5.3     |  29     |  1.51     Ca    9.2        14 Aug 2017 06:20        CAPILLARY BLOOD GLUCOSE  215 (13 Aug 2017 21:50)  100 (13 Aug 2017 16:14)

## 2017-08-16 RX ADMIN — ATORVASTATIN CALCIUM 40 MILLIGRAM(S): 80 TABLET, FILM COATED ORAL at 21:27

## 2017-08-16 RX ADMIN — GABAPENTIN 600 MILLIGRAM(S): 400 CAPSULE ORAL at 06:02

## 2017-08-16 RX ADMIN — GABAPENTIN 600 MILLIGRAM(S): 400 CAPSULE ORAL at 11:49

## 2017-08-16 RX ADMIN — Medication 5 MILLIGRAM(S): at 11:49

## 2017-08-16 RX ADMIN — PANTOPRAZOLE SODIUM 40 MILLIGRAM(S): 20 TABLET, DELAYED RELEASE ORAL at 06:02

## 2017-08-16 RX ADMIN — INSULIN GLARGINE 50 UNIT(S): 100 INJECTION, SOLUTION SUBCUTANEOUS at 21:28

## 2017-08-16 RX ADMIN — Medication 10 MILLIGRAM(S): at 13:37

## 2017-08-16 RX ADMIN — GABAPENTIN 600 MILLIGRAM(S): 400 CAPSULE ORAL at 17:12

## 2017-08-16 RX ADMIN — FAMOTIDINE 20 MILLIGRAM(S): 10 INJECTION INTRAVENOUS at 11:48

## 2017-08-16 RX ADMIN — Medication 5 UNIT(S): at 08:38

## 2017-08-16 RX ADMIN — Medication 30 MILLILITER(S): at 06:01

## 2017-08-16 RX ADMIN — Medication 81 MILLIGRAM(S): at 11:48

## 2017-08-16 RX ADMIN — CLOPIDOGREL BISULFATE 75 MILLIGRAM(S): 75 TABLET, FILM COATED ORAL at 11:48

## 2017-08-16 RX ADMIN — Medication 3 MILLILITER(S): at 08:26

## 2017-08-16 RX ADMIN — Medication 5 MILLIGRAM(S): at 06:04

## 2017-08-16 RX ADMIN — HEPARIN SODIUM 5000 UNIT(S): 5000 INJECTION INTRAVENOUS; SUBCUTANEOUS at 17:17

## 2017-08-16 RX ADMIN — INSULIN GLARGINE 50 UNIT(S): 100 INJECTION, SOLUTION SUBCUTANEOUS at 09:41

## 2017-08-16 RX ADMIN — Medication 50 MILLIGRAM(S): at 21:26

## 2017-08-16 RX ADMIN — Medication 10 MILLIGRAM(S): at 06:02

## 2017-08-16 RX ADMIN — Medication 5 MILLIGRAM(S): at 06:02

## 2017-08-16 RX ADMIN — LISINOPRIL 2.5 MILLIGRAM(S): 2.5 TABLET ORAL at 06:02

## 2017-08-16 RX ADMIN — Medication 1: at 08:39

## 2017-08-16 RX ADMIN — Medication 5 UNIT(S): at 11:47

## 2017-08-16 RX ADMIN — Medication 5 MILLIGRAM(S): at 21:26

## 2017-08-16 RX ADMIN — Medication 5 UNIT(S): at 17:20

## 2017-08-16 RX ADMIN — Medication 3 MILLIGRAM(S): at 21:27

## 2017-08-16 RX ADMIN — Medication 3 MILLILITER(S): at 19:39

## 2017-08-16 RX ADMIN — HEPARIN SODIUM 5000 UNIT(S): 5000 INJECTION INTRAVENOUS; SUBCUTANEOUS at 06:02

## 2017-08-16 RX ADMIN — GABAPENTIN 600 MILLIGRAM(S): 400 CAPSULE ORAL at 22:59

## 2017-08-16 RX ADMIN — Medication 10 MILLIGRAM(S): at 21:26

## 2017-08-16 RX ADMIN — Medication 100 MILLIGRAM(S): at 21:27

## 2017-08-16 NOTE — PROGRESS NOTE ADULT - NSHPATTENDINGPLANDISCUSS_GEN_ALL_CORE
pt at bedside and staff.
pt, RN, social work

## 2017-08-16 NOTE — PROGRESS NOTE ADULT - ASSESSMENT
58yo female presenting to the  ED due to SOB and CP X1 day.     * RUQ tenderness to palpation.    - now improved  - hx abdominal hernia dx'd 5 months ago at [place].  - CT AB/pelvis  - Sx consult for diastasis of recti-  not a candidate for surgical intervention      * dysphagia.  hx GERD.  - questionable hx of esophageal stricture dx'd at [place].  - speech pathology input reviewed.  -  barium esophagram, no evidence of masses or ulcers involving the esophagus but there is esophageal dysmotility and distal esophageal spasm.  - PPI.  - GI input noted.  EGD deferred.  GI f/u outpatient w/ Dr. Kaden Woodward.  - start reglan- nausea improved     * NQWMI (type 2).  hx CAD-AMI + CABG.  - chest pain resolved.  -  coronary angiogram nonobstructive CAD.  - c/w DAPT + ACEI + statin.    * suspected viral bronchitis.  hx COPD.  -O2 on ambulation 87%, requires home O2  -resolving.  reports breathing has improved.  -completed azithromycin 5 of 5days  -DUO neb q6h + JOANN HFA q6h PRN.  -guaifenesin.    * hx type 2 DM.  - uncontrolled.  - ADA.  - A1C 9.6%.  - basal bolus insulin regimen    *MORELIA  - gentle IV fluid hydration  - repeat renal panel in AM    *incidental finding on 10cm pelvic cyst  - outpatient gyn f/u and possible MRI    disposition.  -patient undomiciled and desires to return to [facility] ("where my stuff [facility]"), but they will not accommodate O2. pt now agreeable to [facility], pending placement and insurance verification with CM  -case management following, re:  placement  - patient is medically optimized for discharge. .     d/w Dr Silva- Plan explained to patient and would like to go back to homeless shelter and does not want to be discahrged to rehab. SW and CM referral.

## 2017-08-16 NOTE — PROGRESS NOTE ADULT - ASSESSMENT
Imp:  Dysphagia - possible GE junction stricture    Rec:  EGD tentatively friday. Will hold plavix today and tomorrow

## 2017-08-16 NOTE — PROGRESS NOTE ADULT - SUBJECTIVE AND OBJECTIVE BOX
CC: coughing, CP    HPI: 56yo female.  presenting to the  ED due to SOB and CP X1 day. Patient is homeless, resides in the shelter. In the ED, mildly elevated trops noted. Patient admitted to r/o ACS and was about to undergo cardiac cath on 8/4 but deferred in the setting of fever temp 102. Patient treated for viral bronchitis and given azithromycin. She underwent cath on 8/7 revealing nonobstructive CAD. During hospital course patient had 1:1 for closer monitoring after episode of confusion / depressed MS (work up negative) and refuses to part with personal bag in room.     PMHx:  COPD;  lung CA;  DM;  CAD-AMI + CABG.    8/15- laying in bed - in no distress- complaining of feeling nauseous -  as per patient she used to take Reglan 4 times a day  8/16 - feels better today- does not want to go to rehab would like to go back to the shelter because they have her belongings. she said she will not use the oxygen if it's whats keeping her her      ROS:   All 10 systems reviewed and found to be negative with the exception of what has been described above.    Vital Signs Last 24 Hrs  T(C): 36.3 (16 Aug 2017 11:48), Max: 37.1 (15 Aug 2017 20:51)  T(F): 97.4 (16 Aug 2017 11:48), Max: 98.8 (15 Aug 2017 20:51)  HR: 88 (16 Aug 2017 11:48) (75 - 95)  BP: 97/41 (16 Aug 2017 11:48) (97/41 - 120/50)  BP(mean): --  RR: 18 (16 Aug 2017 11:48) (18 - 95)  SpO2: 98% (16 Aug 2017 11:48) (86% - 98%)      PE:  Constitutional: NAD, laying in bed  HEENT: NC/AT, EOMI, PERRLA  Neck: supple  Back: no tenderness  Respiratory: LCTA  Cardiovascular: S1S2 regular, no murmurs  Abdomen: soft, not tender, not distended, positive BS  Genitourinary: voiding  Rectal: deferred  Musculoskeletal: no muscle tenderness, no joint swelling or tenderness  Extremities: no pedal edema   Neurological: no focal deficits      MEDICATIONS  (STANDING):  heparin  Injectable 5000 Unit(s) SubCutaneous every 12 hours  lisinopril 2.5 milliGRAM(s) Oral daily  gabapentin 600 milliGRAM(s) Oral four times a day  atorvastatin 40 milliGRAM(s) Oral at bedtime  famotidine    Tablet 20 milliGRAM(s) Oral daily  pantoprazole    Tablet 40 milliGRAM(s) Oral before breakfast  dextrose 5%. 1000 milliLiter(s) (50 mL/Hr) IV Continuous <Continuous>  dextrose 50% Injectable 12.5 Gram(s) IV Push once  dextrose 50% Injectable 25 Gram(s) IV Push once  dextrose 50% Injectable 25 Gram(s) IV Push once  oxybutynin 10 milliGRAM(s) Oral three times a day  ALBUTerol/ipratropium for Nebulization 3 milliLiter(s) Nebulizer every 6 hours  clopidogrel Tablet 75 milliGRAM(s) Oral daily  aspirin enteric coated 81 milliGRAM(s) Oral daily  insulin lispro (HumaLOG) corrective regimen sliding scale   SubCutaneous three times a day before meals  insulin lispro Injectable (HumaLOG) 5 Unit(s) SubCutaneous three times a day before meals  sodium chloride 0.9%. 1000 milliLiter(s) (75 mL/Hr) IV Continuous <Continuous>  metoclopramide 5 milliGRAM(s) Oral Before meals and at bedtime  insulin glargine Injectable (LANTUS) 50 Unit(s) SubCutaneous two times a day  imipramine 50 milliGRAM(s) Oral at bedtime    MEDICATIONS  (PRN):  acetaminophen   Tablet. 650 milliGRAM(s) Oral every 6 hours PRN Mild Pain (1 - 3)  ALBUTerol    90 MICROgram(s) HFA Inhaler 2 Puff(s) Inhalation every 6 hours PRN Shortness of Breath and/or Wheezing  guaiFENesin    Syrup 100 milliGRAM(s) Oral every 6 hours PRN Cough  dextrose Gel 1 Dose(s) Oral once PRN Blood Glucose LESS THAN 70 milliGRAM(s)/deciLiter  glucagon  Injectable 1 milliGRAM(s) IntraMuscular once PRN Glucose <70 milliGRAM(s)/deciLiter  promethazine Syrup 5 milliGRAM(s) Oral every 6 hours PRN Cough  ondansetron Injectable 4 milliGRAM(s) IV Push every 4 hours PRN Nausea and/or Vomiting  melatonin 3 milliGRAM(s) Oral at bedtime PRN Insomnia  oxyCODONE    5 mG/acetaminophen 325 mG 1 Tablet(s) Oral every 6 hours PRN Severe Pain (7 - 10)                                           11.6   8.9   )-----------( 256      ( 14 Aug 2017 06:20 )             34.1     14 Aug 2017 06:20    135    |  99     |  44     ----------------------------<  211    5.3     |  29     |  1.51     Ca    9.2        14 Aug 2017 06:20        CAPILLARY BLOOD GLUCOSE  215 (13 Aug 2017 21:50)  100 (13 Aug 2017 16:14)

## 2017-08-16 NOTE — PROGRESS NOTE ADULT - SUBJECTIVE AND OBJECTIVE BOX
Patient is a 57y old  Female who presents with a chief complaint of chest pain (11 Aug 2017 15:19)      Subective:  Tolerating POs but with difficulty. Eager to have EGD in house.    PAST MEDICAL & SURGICAL HISTORY:  Lung cancer  COPD (chronic obstructive pulmonary disease)  No significant past surgical history      MEDICATIONS  (STANDING):  heparin  Injectable 5000 Unit(s) SubCutaneous every 12 hours  lisinopril 2.5 milliGRAM(s) Oral daily  gabapentin 600 milliGRAM(s) Oral four times a day  atorvastatin 40 milliGRAM(s) Oral at bedtime  famotidine    Tablet 20 milliGRAM(s) Oral daily  pantoprazole    Tablet 40 milliGRAM(s) Oral before breakfast  dextrose 5%. 1000 milliLiter(s) (50 mL/Hr) IV Continuous <Continuous>  dextrose 50% Injectable 12.5 Gram(s) IV Push once  dextrose 50% Injectable 25 Gram(s) IV Push once  dextrose 50% Injectable 25 Gram(s) IV Push once  oxybutynin 10 milliGRAM(s) Oral three times a day  ALBUTerol/ipratropium for Nebulization 3 milliLiter(s) Nebulizer every 6 hours  aspirin enteric coated 81 milliGRAM(s) Oral daily  insulin lispro (HumaLOG) corrective regimen sliding scale   SubCutaneous three times a day before meals  insulin lispro Injectable (HumaLOG) 5 Unit(s) SubCutaneous three times a day before meals  sodium chloride 0.9%. 1000 milliLiter(s) (75 mL/Hr) IV Continuous <Continuous>  metoclopramide 5 milliGRAM(s) Oral Before meals and at bedtime  insulin glargine Injectable (LANTUS) 50 Unit(s) SubCutaneous two times a day  imipramine 50 milliGRAM(s) Oral at bedtime    MEDICATIONS  (PRN):  acetaminophen   Tablet. 650 milliGRAM(s) Oral every 6 hours PRN Mild Pain (1 - 3)  ALBUTerol    90 MICROgram(s) HFA Inhaler 2 Puff(s) Inhalation every 6 hours PRN Shortness of Breath and/or Wheezing  guaiFENesin    Syrup 100 milliGRAM(s) Oral every 6 hours PRN Cough  dextrose Gel 1 Dose(s) Oral once PRN Blood Glucose LESS THAN 70 milliGRAM(s)/deciLiter  glucagon  Injectable 1 milliGRAM(s) IntraMuscular once PRN Glucose <70 milliGRAM(s)/deciLiter  promethazine Syrup 5 milliGRAM(s) Oral every 6 hours PRN Cough  ondansetron Injectable 4 milliGRAM(s) IV Push every 4 hours PRN Nausea and/or Vomiting  melatonin 3 milliGRAM(s) Oral at bedtime PRN Insomnia  oxyCODONE    5 mG/acetaminophen 325 mG 1 Tablet(s) Oral every 6 hours PRN Severe Pain (7 - 10)      REVIEW OF SYSTEMS:    RESPIRATORY: No shortness of breath  CARDIOVASCULAR: No chest pain  All other review of systems is negative unless indicated above.    Vital Signs Last 24 Hrs  T(C): 36.3 (16 Aug 2017 11:48), Max: 37.1 (15 Aug 2017 20:51)  T(F): 97.4 (16 Aug 2017 11:48), Max: 98.8 (15 Aug 2017 20:51)  HR: 88 (16 Aug 2017 11:48) (75 - 95)  BP: 97/41 (16 Aug 2017 11:48) (97/41 - 120/50)  BP(mean): --  RR: 18 (16 Aug 2017 11:48) (18 - 95)  SpO2: 98% (16 Aug 2017 11:48) (86% - 98%)    PHYSICAL EXAM:    Constitutional: NAD, well-developed  Respiratory: CTAB  Cardiovascular: S1 and S2, RRR  Gastrointestinal: BS+, soft, NT/ND  Extremities: No peripheral edema  Psychiatric: Normal mood, normal affect    LABS:    08-15    137  |  101  |  46<H>  ----------------------------<  237<H>  4.8   |  28  |  1.19    Ca    8.8      15 Aug 2017 05:38            RADIOLOGY & ADDITIONAL STUDIES:

## 2017-08-17 RX ORDER — METOCLOPRAMIDE HCL 10 MG
1 TABLET ORAL
Qty: 0 | Refills: 0 | COMMUNITY
Start: 2017-08-17

## 2017-08-17 RX ORDER — INSULIN GLARGINE 100 [IU]/ML
25 INJECTION, SOLUTION SUBCUTANEOUS ONCE
Qty: 0 | Refills: 0 | Status: COMPLETED | OUTPATIENT
Start: 2017-08-17 | End: 2017-08-17

## 2017-08-17 RX ORDER — INSULIN GLARGINE 100 [IU]/ML
50 INJECTION, SOLUTION SUBCUTANEOUS
Qty: 0 | Refills: 0 | COMMUNITY
Start: 2017-08-17

## 2017-08-17 RX ADMIN — Medication 3 MILLILITER(S): at 19:45

## 2017-08-17 RX ADMIN — Medication 10 MILLIGRAM(S): at 21:38

## 2017-08-17 RX ADMIN — Medication 10 MILLIGRAM(S): at 13:59

## 2017-08-17 RX ADMIN — GABAPENTIN 600 MILLIGRAM(S): 400 CAPSULE ORAL at 23:12

## 2017-08-17 RX ADMIN — Medication 5 UNIT(S): at 09:59

## 2017-08-17 RX ADMIN — GABAPENTIN 600 MILLIGRAM(S): 400 CAPSULE ORAL at 06:18

## 2017-08-17 RX ADMIN — Medication 5 MILLIGRAM(S): at 21:37

## 2017-08-17 RX ADMIN — Medication 3 MILLILITER(S): at 14:00

## 2017-08-17 RX ADMIN — Medication 5 MILLIGRAM(S): at 13:51

## 2017-08-17 RX ADMIN — INSULIN GLARGINE 25 UNIT(S): 100 INJECTION, SOLUTION SUBCUTANEOUS at 22:15

## 2017-08-17 RX ADMIN — INSULIN GLARGINE 50 UNIT(S): 100 INJECTION, SOLUTION SUBCUTANEOUS at 10:00

## 2017-08-17 RX ADMIN — Medication 50 MILLIGRAM(S): at 21:37

## 2017-08-17 RX ADMIN — GABAPENTIN 600 MILLIGRAM(S): 400 CAPSULE ORAL at 13:51

## 2017-08-17 RX ADMIN — Medication 5 MILLIGRAM(S): at 17:10

## 2017-08-17 RX ADMIN — PANTOPRAZOLE SODIUM 40 MILLIGRAM(S): 20 TABLET, DELAYED RELEASE ORAL at 06:19

## 2017-08-17 RX ADMIN — HEPARIN SODIUM 5000 UNIT(S): 5000 INJECTION INTRAVENOUS; SUBCUTANEOUS at 17:10

## 2017-08-17 RX ADMIN — Medication 3 MILLIGRAM(S): at 21:37

## 2017-08-17 RX ADMIN — HEPARIN SODIUM 5000 UNIT(S): 5000 INJECTION INTRAVENOUS; SUBCUTANEOUS at 06:18

## 2017-08-17 RX ADMIN — Medication 5 UNIT(S): at 13:52

## 2017-08-17 RX ADMIN — Medication 10 MILLIGRAM(S): at 06:19

## 2017-08-17 RX ADMIN — FAMOTIDINE 20 MILLIGRAM(S): 10 INJECTION INTRAVENOUS at 13:50

## 2017-08-17 RX ADMIN — LISINOPRIL 2.5 MILLIGRAM(S): 2.5 TABLET ORAL at 06:18

## 2017-08-17 RX ADMIN — ATORVASTATIN CALCIUM 40 MILLIGRAM(S): 80 TABLET, FILM COATED ORAL at 21:37

## 2017-08-17 RX ADMIN — Medication 81 MILLIGRAM(S): at 13:50

## 2017-08-17 RX ADMIN — Medication 5 MILLIGRAM(S): at 06:22

## 2017-08-17 RX ADMIN — GABAPENTIN 600 MILLIGRAM(S): 400 CAPSULE ORAL at 17:10

## 2017-08-18 VITALS
HEART RATE: 102 BPM | SYSTOLIC BLOOD PRESSURE: 126 MMHG | RESPIRATION RATE: 18 BRPM | OXYGEN SATURATION: 93 % | TEMPERATURE: 98 F | DIASTOLIC BLOOD PRESSURE: 72 MMHG

## 2017-08-18 RX ORDER — INSULIN GLARGINE 100 [IU]/ML
25 INJECTION, SOLUTION SUBCUTANEOUS
Qty: 0 | Refills: 0 | Status: DISCONTINUED | OUTPATIENT
Start: 2017-08-18 | End: 2017-08-18

## 2017-08-18 RX ORDER — INSULIN GLARGINE 100 [IU]/ML
25 INJECTION, SOLUTION SUBCUTANEOUS
Qty: 0 | Refills: 0 | COMMUNITY
Start: 2017-08-18

## 2017-08-18 RX ADMIN — GABAPENTIN 600 MILLIGRAM(S): 400 CAPSULE ORAL at 11:58

## 2017-08-18 RX ADMIN — Medication 81 MILLIGRAM(S): at 12:05

## 2017-08-18 RX ADMIN — LISINOPRIL 2.5 MILLIGRAM(S): 2.5 TABLET ORAL at 05:25

## 2017-08-18 RX ADMIN — Medication 5 MILLIGRAM(S): at 09:44

## 2017-08-18 RX ADMIN — HEPARIN SODIUM 5000 UNIT(S): 5000 INJECTION INTRAVENOUS; SUBCUTANEOUS at 05:24

## 2017-08-18 RX ADMIN — FAMOTIDINE 20 MILLIGRAM(S): 10 INJECTION INTRAVENOUS at 12:05

## 2017-08-18 RX ADMIN — PANTOPRAZOLE SODIUM 40 MILLIGRAM(S): 20 TABLET, DELAYED RELEASE ORAL at 09:44

## 2017-08-18 RX ADMIN — Medication 10 MILLIGRAM(S): at 13:32

## 2017-08-18 RX ADMIN — Medication 10 MILLIGRAM(S): at 05:25

## 2017-08-18 RX ADMIN — GABAPENTIN 600 MILLIGRAM(S): 400 CAPSULE ORAL at 05:25

## 2017-08-18 RX ADMIN — Medication 5 MILLIGRAM(S): at 11:58

## 2017-08-18 RX ADMIN — Medication 100 MILLIGRAM(S): at 02:18

## 2017-08-18 NOTE — PROGRESS NOTE ADULT - SUBJECTIVE AND OBJECTIVE BOX
Patient is a 57y old  Female who presents with a chief complaint of chest pain (11 Aug 2017 15:19)      Subective:  Feels good. Swallowing unchanged.    PAST MEDICAL & SURGICAL HISTORY:  Lung cancer  COPD (chronic obstructive pulmonary disease)  No significant past surgical history      MEDICATIONS  (STANDING):  heparin  Injectable 5000 Unit(s) SubCutaneous every 12 hours  lisinopril 2.5 milliGRAM(s) Oral daily  gabapentin 600 milliGRAM(s) Oral four times a day  atorvastatin 40 milliGRAM(s) Oral at bedtime  famotidine    Tablet 20 milliGRAM(s) Oral daily  pantoprazole    Tablet 40 milliGRAM(s) Oral before breakfast  dextrose 5%. 1000 milliLiter(s) (50 mL/Hr) IV Continuous <Continuous>  dextrose 50% Injectable 12.5 Gram(s) IV Push once  dextrose 50% Injectable 25 Gram(s) IV Push once  dextrose 50% Injectable 25 Gram(s) IV Push once  oxybutynin 10 milliGRAM(s) Oral three times a day  ALBUTerol/ipratropium for Nebulization 3 milliLiter(s) Nebulizer every 6 hours  aspirin enteric coated 81 milliGRAM(s) Oral daily  insulin lispro (HumaLOG) corrective regimen sliding scale   SubCutaneous three times a day before meals  insulin lispro Injectable (HumaLOG) 5 Unit(s) SubCutaneous three times a day before meals  sodium chloride 0.9%. 1000 milliLiter(s) (75 mL/Hr) IV Continuous <Continuous>  metoclopramide 5 milliGRAM(s) Oral Before meals and at bedtime  insulin glargine Injectable (LANTUS) 50 Unit(s) SubCutaneous two times a day  imipramine 50 milliGRAM(s) Oral at bedtime    MEDICATIONS  (PRN):  acetaminophen   Tablet. 650 milliGRAM(s) Oral every 6 hours PRN Mild Pain (1 - 3)  ALBUTerol    90 MICROgram(s) HFA Inhaler 2 Puff(s) Inhalation every 6 hours PRN Shortness of Breath and/or Wheezing  guaiFENesin    Syrup 100 milliGRAM(s) Oral every 6 hours PRN Cough  dextrose Gel 1 Dose(s) Oral once PRN Blood Glucose LESS THAN 70 milliGRAM(s)/deciLiter  glucagon  Injectable 1 milliGRAM(s) IntraMuscular once PRN Glucose <70 milliGRAM(s)/deciLiter  promethazine Syrup 5 milliGRAM(s) Oral every 6 hours PRN Cough  ondansetron Injectable 4 milliGRAM(s) IV Push every 4 hours PRN Nausea and/or Vomiting  melatonin 3 milliGRAM(s) Oral at bedtime PRN Insomnia  oxyCODONE    5 mG/acetaminophen 325 mG 1 Tablet(s) Oral every 6 hours PRN Severe Pain (7 - 10)      REVIEW OF SYSTEMS:    RESPIRATORY: No shortness of breath  CARDIOVASCULAR: No chest pain  All other review of systems is negative unless indicated above.    Vital Signs Last 24 Hrs  T(C): 37.2 (18 Aug 2017 05:46), Max: 37.2 (18 Aug 2017 05:46)  T(F): 98.9 (18 Aug 2017 05:46), Max: 98.9 (18 Aug 2017 05:46)  HR: 90 (18 Aug 2017 05:46) (65 - 99)  BP: 122/58 (18 Aug 2017 05:46) (121/69 - 127/65)  BP(mean): --  RR: 18 (18 Aug 2017 05:46) (17 - 19)  SpO2: 94% (18 Aug 2017 05:46) (91% - 98%)    PHYSICAL EXAM:    Constitutional: NAD, well-developed  Respiratory: CTAB  Cardiovascular: S1 and S2, RRR  Gastrointestinal: BS+, soft, NT/ND  Extremities: No peripheral edema  Psychiatric: Normal mood, normal affect

## 2017-08-18 NOTE — PROGRESS NOTE ADULT - ASSESSMENT
Imp:  Chronic dysphagia -- achalasia vs stricture    Rec:  No time in endo schedule today  I could arrange next week but d/c planned  Patient instructed to follow up with me which she agrees to do  OK to resume antiplatelet agents as procedure deferrred

## 2017-08-18 NOTE — PROGRESS NOTE ADULT - PROVIDER SPECIALTY LIST ADULT
Gastroenterology
Hospitalist

## 2017-08-23 DIAGNOSIS — K22.4 DYSKINESIA OF ESOPHAGUS: ICD-10-CM

## 2017-08-23 DIAGNOSIS — R13.10 DYSPHAGIA, UNSPECIFIED: ICD-10-CM

## 2017-08-23 DIAGNOSIS — N94.89 OTHER SPECIFIED CONDITIONS ASSOCIATED WITH FEMALE GENITAL ORGANS AND MENSTRUAL CYCLE: ICD-10-CM

## 2017-08-23 DIAGNOSIS — B34.9 VIRAL INFECTION, UNSPECIFIED: ICD-10-CM

## 2017-08-23 DIAGNOSIS — I21.4 NON-ST ELEVATION (NSTEMI) MYOCARDIAL INFARCTION: ICD-10-CM

## 2017-08-23 DIAGNOSIS — J44.1 CHRONIC OBSTRUCTIVE PULMONARY DISEASE WITH (ACUTE) EXACERBATION: ICD-10-CM

## 2017-08-23 DIAGNOSIS — N17.9 ACUTE KIDNEY FAILURE, UNSPECIFIED: ICD-10-CM

## 2017-08-23 DIAGNOSIS — G93.40 ENCEPHALOPATHY, UNSPECIFIED: ICD-10-CM

## 2017-08-23 DIAGNOSIS — F17.210 NICOTINE DEPENDENCE, CIGARETTES, UNCOMPLICATED: ICD-10-CM

## 2017-08-23 DIAGNOSIS — Z95.1 PRESENCE OF AORTOCORONARY BYPASS GRAFT: ICD-10-CM

## 2017-08-23 DIAGNOSIS — J20.8 ACUTE BRONCHITIS DUE TO OTHER SPECIFIED ORGANISMS: ICD-10-CM

## 2017-08-23 DIAGNOSIS — K44.9 DIAPHRAGMATIC HERNIA WITHOUT OBSTRUCTION OR GANGRENE: ICD-10-CM

## 2017-08-23 DIAGNOSIS — E11.65 TYPE 2 DIABETES MELLITUS WITH HYPERGLYCEMIA: ICD-10-CM

## 2017-08-23 DIAGNOSIS — M62.08 SEPARATION OF MUSCLE (NONTRAUMATIC), OTHER SITE: ICD-10-CM

## 2017-08-23 DIAGNOSIS — Z59.0 HOMELESSNESS: ICD-10-CM

## 2017-08-23 DIAGNOSIS — R07.9 CHEST PAIN, UNSPECIFIED: ICD-10-CM

## 2017-08-23 DIAGNOSIS — I25.2 OLD MYOCARDIAL INFARCTION: ICD-10-CM

## 2017-08-23 DIAGNOSIS — Z85.118 PERSONAL HISTORY OF OTHER MALIGNANT NEOPLASM OF BRONCHUS AND LUNG: ICD-10-CM

## 2017-08-23 DIAGNOSIS — I10 ESSENTIAL (PRIMARY) HYPERTENSION: ICD-10-CM

## 2017-08-23 DIAGNOSIS — R50.9 FEVER, UNSPECIFIED: ICD-10-CM

## 2017-08-23 DIAGNOSIS — E11.649 TYPE 2 DIABETES MELLITUS WITH HYPOGLYCEMIA WITHOUT COMA: ICD-10-CM

## 2017-08-23 DIAGNOSIS — K21.9 GASTRO-ESOPHAGEAL REFLUX DISEASE WITHOUT ESOPHAGITIS: ICD-10-CM

## 2017-08-23 DIAGNOSIS — E78.00 PURE HYPERCHOLESTEROLEMIA, UNSPECIFIED: ICD-10-CM

## 2017-08-23 DIAGNOSIS — E66.9 OBESITY, UNSPECIFIED: ICD-10-CM

## 2017-08-23 DIAGNOSIS — I73.9 PERIPHERAL VASCULAR DISEASE, UNSPECIFIED: ICD-10-CM

## 2017-08-23 SDOH — ECONOMIC STABILITY - HOUSING INSECURITY: HOMELESSNESS: Z59.0

## 2017-09-02 ENCOUNTER — INPATIENT (INPATIENT)
Facility: HOSPITAL | Age: 57
LOS: 4 days | Discharge: HOME | End: 2017-09-07
Attending: INTERNAL MEDICINE | Admitting: INTERNAL MEDICINE
Payer: MEDICAID

## 2017-09-02 VITALS
TEMPERATURE: 98 F | SYSTOLIC BLOOD PRESSURE: 100 MMHG | OXYGEN SATURATION: 97 % | HEART RATE: 108 BPM | WEIGHT: 207.9 LBS | RESPIRATION RATE: 17 BRPM | HEIGHT: 64 IN | DIASTOLIC BLOOD PRESSURE: 62 MMHG

## 2017-09-02 LAB
ALBUMIN SERPL ELPH-MCNC: 3.6 G/DL — SIGNIFICANT CHANGE UP (ref 3.3–5)
ALP SERPL-CCNC: 100 U/L — SIGNIFICANT CHANGE UP (ref 40–120)
ALT FLD-CCNC: 14 U/L — SIGNIFICANT CHANGE UP (ref 12–78)
ANION GAP SERPL CALC-SCNC: 12 MMOL/L — SIGNIFICANT CHANGE UP (ref 5–17)
ANION GAP SERPL CALC-SCNC: 7 MMOL/L — SIGNIFICANT CHANGE UP (ref 5–17)
APTT BLD: 32 SEC — SIGNIFICANT CHANGE UP (ref 27.5–37.4)
AST SERPL-CCNC: 18 U/L — SIGNIFICANT CHANGE UP (ref 15–37)
BASOPHILS # BLD AUTO: 0.1 K/UL — SIGNIFICANT CHANGE UP (ref 0–0.2)
BASOPHILS NFR BLD AUTO: 1.4 % — SIGNIFICANT CHANGE UP (ref 0–2)
BILIRUB SERPL-MCNC: 0.5 MG/DL — SIGNIFICANT CHANGE UP (ref 0.2–1.2)
BUN SERPL-MCNC: 26 MG/DL — HIGH (ref 7–23)
BUN SERPL-MCNC: 28 MG/DL — HIGH (ref 7–23)
CALCIUM SERPL-MCNC: 9.1 MG/DL — SIGNIFICANT CHANGE UP (ref 8.5–10.1)
CALCIUM SERPL-MCNC: 9.4 MG/DL — SIGNIFICANT CHANGE UP (ref 8.5–10.1)
CHLORIDE SERPL-SCNC: 100 MMOL/L — SIGNIFICANT CHANGE UP (ref 96–108)
CHLORIDE SERPL-SCNC: 103 MMOL/L — SIGNIFICANT CHANGE UP (ref 96–108)
CO2 SERPL-SCNC: 23 MMOL/L — SIGNIFICANT CHANGE UP (ref 22–31)
CO2 SERPL-SCNC: 25 MMOL/L — SIGNIFICANT CHANGE UP (ref 22–31)
CREAT SERPL-MCNC: 1.33 MG/DL — HIGH (ref 0.5–1.3)
CREAT SERPL-MCNC: 1.46 MG/DL — HIGH (ref 0.5–1.3)
EOSINOPHIL # BLD AUTO: 0.3 K/UL — SIGNIFICANT CHANGE UP (ref 0–0.5)
EOSINOPHIL NFR BLD AUTO: 3 % — SIGNIFICANT CHANGE UP (ref 0–6)
GLUCOSE SERPL-MCNC: 253 MG/DL — HIGH (ref 70–99)
GLUCOSE SERPL-MCNC: 287 MG/DL — HIGH (ref 70–99)
HCT VFR BLD CALC: 37 % — SIGNIFICANT CHANGE UP (ref 34.5–45)
HGB BLD-MCNC: 12.7 G/DL — SIGNIFICANT CHANGE UP (ref 11.5–15.5)
INR BLD: 1.16 RATIO — SIGNIFICANT CHANGE UP (ref 0.88–1.16)
LYMPHOCYTES # BLD AUTO: 2.8 K/UL — SIGNIFICANT CHANGE UP (ref 1–3.3)
LYMPHOCYTES # BLD AUTO: 28 % — SIGNIFICANT CHANGE UP (ref 13–44)
MCHC RBC-ENTMCNC: 28.9 PG — SIGNIFICANT CHANGE UP (ref 27–34)
MCHC RBC-ENTMCNC: 34.2 GM/DL — SIGNIFICANT CHANGE UP (ref 32–36)
MCV RBC AUTO: 84.6 FL — SIGNIFICANT CHANGE UP (ref 80–100)
MONOCYTES # BLD AUTO: 0.7 K/UL — SIGNIFICANT CHANGE UP (ref 0–0.9)
MONOCYTES NFR BLD AUTO: 7.3 % — SIGNIFICANT CHANGE UP (ref 2–14)
NEUTROPHILS # BLD AUTO: 5.9 K/UL — SIGNIFICANT CHANGE UP (ref 1.8–7.4)
NEUTROPHILS NFR BLD AUTO: 60.2 % — SIGNIFICANT CHANGE UP (ref 43–77)
NT-PROBNP SERPL-SCNC: 1027 PG/ML — HIGH (ref 0–125)
PLATELET # BLD AUTO: 283 K/UL — SIGNIFICANT CHANGE UP (ref 150–400)
POTASSIUM SERPL-MCNC: 4.1 MMOL/L — SIGNIFICANT CHANGE UP (ref 3.5–5.3)
POTASSIUM SERPL-MCNC: 4.7 MMOL/L — SIGNIFICANT CHANGE UP (ref 3.5–5.3)
POTASSIUM SERPL-SCNC: 4.1 MMOL/L — SIGNIFICANT CHANGE UP (ref 3.5–5.3)
POTASSIUM SERPL-SCNC: 4.7 MMOL/L — SIGNIFICANT CHANGE UP (ref 3.5–5.3)
PROT SERPL-MCNC: 7.1 GM/DL — SIGNIFICANT CHANGE UP (ref 6–8.3)
PROTHROM AB SERPL-ACNC: 12.6 SEC — SIGNIFICANT CHANGE UP (ref 9.8–12.7)
RAPID RVP RESULT: SIGNIFICANT CHANGE UP
RBC # BLD: 4.38 M/UL — SIGNIFICANT CHANGE UP (ref 3.8–5.2)
RBC # FLD: 14 % — SIGNIFICANT CHANGE UP (ref 10.3–14.5)
SODIUM SERPL-SCNC: 135 MMOL/L — SIGNIFICANT CHANGE UP (ref 135–145)
SODIUM SERPL-SCNC: 135 MMOL/L — SIGNIFICANT CHANGE UP (ref 135–145)
TROPONIN I SERPL-MCNC: <0.015 NG/ML — SIGNIFICANT CHANGE UP (ref 0.01–0.04)
TROPONIN I SERPL-MCNC: <0.015 NG/ML — SIGNIFICANT CHANGE UP (ref 0.01–0.04)
WBC # BLD: 9.9 K/UL — SIGNIFICANT CHANGE UP (ref 3.8–10.5)
WBC # FLD AUTO: 9.9 K/UL — SIGNIFICANT CHANGE UP (ref 3.8–10.5)

## 2017-09-02 PROCEDURE — 71010: CPT | Mod: 26

## 2017-09-02 PROCEDURE — 71275 CT ANGIOGRAPHY CHEST: CPT | Mod: 26

## 2017-09-02 PROCEDURE — 93010 ELECTROCARDIOGRAM REPORT: CPT

## 2017-09-02 PROCEDURE — 99285 EMERGENCY DEPT VISIT HI MDM: CPT

## 2017-09-02 RX ORDER — OXYBUTYNIN CHLORIDE 5 MG
1 TABLET ORAL
Qty: 0 | Refills: 0 | COMMUNITY

## 2017-09-02 RX ORDER — SODIUM CHLORIDE 9 MG/ML
1000 INJECTION INTRAMUSCULAR; INTRAVENOUS; SUBCUTANEOUS ONCE
Qty: 0 | Refills: 0 | Status: COMPLETED | OUTPATIENT
Start: 2017-09-02 | End: 2017-09-02

## 2017-09-02 RX ORDER — ONDANSETRON 8 MG/1
4 TABLET, FILM COATED ORAL ONCE
Qty: 0 | Refills: 0 | Status: COMPLETED | OUTPATIENT
Start: 2017-09-02 | End: 2017-09-02

## 2017-09-02 RX ORDER — INSULIN LISPRO 100/ML
0 VIAL (ML) SUBCUTANEOUS
Qty: 0 | Refills: 0 | COMMUNITY

## 2017-09-02 RX ORDER — ALBUTEROL 90 UG/1
2.5 AEROSOL, METERED ORAL
Qty: 0 | Refills: 0 | Status: COMPLETED | OUTPATIENT
Start: 2017-09-02 | End: 2017-09-02

## 2017-09-02 RX ORDER — SODIUM CHLORIDE 9 MG/ML
3 INJECTION INTRAMUSCULAR; INTRAVENOUS; SUBCUTANEOUS ONCE
Qty: 0 | Refills: 0 | Status: COMPLETED | OUTPATIENT
Start: 2017-09-02 | End: 2017-09-02

## 2017-09-02 RX ORDER — FAMOTIDINE 10 MG/ML
20 INJECTION INTRAVENOUS ONCE
Qty: 0 | Refills: 0 | Status: COMPLETED | OUTPATIENT
Start: 2017-09-02 | End: 2017-09-02

## 2017-09-02 RX ADMIN — SODIUM CHLORIDE 3 MILLILITER(S): 9 INJECTION INTRAMUSCULAR; INTRAVENOUS; SUBCUTANEOUS at 19:18

## 2017-09-02 RX ADMIN — ALBUTEROL 2.5 MILLIGRAM(S): 90 AEROSOL, METERED ORAL at 19:00

## 2017-09-02 RX ADMIN — ONDANSETRON 4 MILLIGRAM(S): 8 TABLET, FILM COATED ORAL at 21:57

## 2017-09-02 RX ADMIN — ALBUTEROL 2.5 MILLIGRAM(S): 90 AEROSOL, METERED ORAL at 18:45

## 2017-09-02 RX ADMIN — ALBUTEROL 2.5 MILLIGRAM(S): 90 AEROSOL, METERED ORAL at 18:30

## 2017-09-02 RX ADMIN — FAMOTIDINE 20 MILLIGRAM(S): 10 INJECTION INTRAVENOUS at 19:45

## 2017-09-02 RX ADMIN — SODIUM CHLORIDE 1000 MILLILITER(S): 9 INJECTION INTRAMUSCULAR; INTRAVENOUS; SUBCUTANEOUS at 20:00

## 2017-09-02 NOTE — H&P ADULT - ASSESSMENT
57 y F with PMHx DM, HTN, CAD ( s/p CABG), COPD on home O2 , active smoker brought in [facility] from shelter [facility] with complaints of shortness of breath, pleuritic chest pain and cough. According to the patient she was recently diagnosed with lung cancer.

## 2017-09-02 NOTE — ED ADULT NURSE NOTE - ED STAT RN HANDOFF DETAILS
Report given to district nurse on 3E. pt a+o x3. ptm belongings sent with pt including a red walker with attached seat. name stickers attached to walker. med lock intact in right AC. no signs or symptoms of IV complications. nasal 02 @ 3 liters in place cardiac monitor in place. pt transported via stretcher to 3E with RN and transporter and belongings

## 2017-09-02 NOTE — ED ADULT NURSE NOTE - OBJECTIVE STATEMENT
Pt reports worsening dyspnea and cough over past 2-3 days, along with hx of COPD and recent dx of lung cancer.  Cardiac and VS monitoring initiated.  EKG done.  20g PIV placed in RFA.

## 2017-09-02 NOTE — H&P ADULT - RS GEN PE MLT RESP DETAILS PC
breath sounds equal/no rales/respirations non-labored/no wheezes/airway patent/no rhonchi/clear to auscultation bilaterally/normal/good air movement

## 2017-09-02 NOTE — ED PROVIDER NOTE - PROGRESS NOTE DETAILS
CTA neg for PE. Trop neg, bnp low. Likely COPD exacerbation primarily. Steroids held 2/2 DM with mild hyperglycemia on arrival. Pt improving with nebs and short time (<1hr) on CPAP. Will admit for further management

## 2017-09-02 NOTE — ED PROVIDER NOTE - OBJECTIVE STATEMENT
56 y/o female pmhx COPD on O2 at  home, DM presents to ED due to worsening sob for the past week. c/o productive cough, cp, light headed. SOB worsened when walking and laying flat. Denies abd pain, n/v/d. Pt had a 3 neb treatment en route to ER w/ some relief of sx.

## 2017-09-02 NOTE — H&P ADULT - PROBLEM SELECTOR PLAN 1
Chest x-ray does not show any gross/ acute pathology  CTA negative for PE  Venous blood gas does not show any CO2 retention  WBC wnl; no fever  Patient saturating 99% on 3 l  Will continue Duoneb q6h  No significant wheezing - as to start steroids  RVP secondary to dehydration  Increased Creatinine 1.46 (vs baseline)  Received 1l of fluid in the ED and will continue at 75ml/hr

## 2017-09-02 NOTE — H&P ADULT - ATTENDING COMMENTS
Pt seen and examined with PGY2 Dr. Johnson.  Case and presentation reviewed in detail.  Agree with plan of care.     Pt is a 58 y/o woman with PMHx DM, HTN, CAD ( s/p CABG), Lung ca , COPD on home O2 , active smoker admitted for worsening SOB, COPD exac and Acute on CKD II,  dehydration .  She reports she last smoked 2 days ago due to SOB.  She has been off her meds for 2 days after leaving her housing facility in [place].  She has been homeless.    Bipap attempted in ER, pt refused and it was discontinued.     - cont nebs, pt reports breathing is better  - cont IVF  - restart reflux meds  - asp precautions  - f/u for endoscopy  - needs follow up and compliance for lung cancer treatment, pt was dx in April 2017 and has not followed up   - smoking cessation

## 2017-09-02 NOTE — H&P ADULT - PROBLEM SELECTOR PLAN 6
during last  admission patient was scheduled to have an upper GI endoscopy but did not have one.   GI consult to rule out recurrent GERD resulting in aspiration pneumonitis

## 2017-09-02 NOTE — H&P ADULT - PROBLEM SELECTOR PLAN 2
Glargine insulin 25U bid  Humalog pre meal Chest x-ray does not show any gross/ acute pathology  CTA negative for PE  Venous blood gas does not show any CO2 retention  WBC wnl; no fever  Patient saturating 99% on 3 l  Will continue Duoneb q6h  No significant wheezing - as to start steroids  RVP

## 2017-09-02 NOTE — H&P ADULT - HISTORY OF PRESENT ILLNESS
57 y F with PMHx DM, HTN, CAD ( s/p CABG), COPD on home O2 , active smoker brought in [facility] from shelter [facility] with complaints of shortness of breath, pleuritic chest pain and cough. According to the patient she was recently diagnosed with lung cancer.     Patient is uncooperative and does not recall what she was doing when the shortness of breath started. Says it started suddenly and has had similar episodes a few times in the past. SOB got progressively worse. She does not recall any alleviating or exacerbating factors. ( Is lying flat in on the stretcher in ED and does not appear to be in any distress.) Says that she has been having cough for the past couple of days, does not recall if is productive. Is not sure if had fever/ chills.   In the ED she was CTA was negative for PE.  CXR did not not show any pathology.   She was recently admitted to [facility]   Denies any palpitations, change in bowel/ urinary habits. 57 y F with PMHx DM, HTN, CAD ( s/p CABG), COPD on home O2 , active smoker brought in [facility] from shelter [facility] with complaints of shortness of breath, pleuritic chest pain and cough. According to the patient she was recently diagnosed with lung cancer.     Patient is uncooperative and does not recall what she was doing when the shortness of breath started. Says it started suddenly and has had similar episodes a few times in the past. SOB got progressively worse. She does not recall any alleviating or exacerbating factors. ( Is lying flat in on the stretcher in ED and does not appear to be in any distress.) Says that she has been having cough for the past couple of days, does not recall if is productive. Is not sure if had fever/ chills.   In the ED she was CTA was negative for PE.  CXR did not not show any pathology. She was given 3x nebulizer treatments en route to the hospital- had symptomatic relief.   She was recently admitted to [facility]   Denies any palpitations, change in bowel/ urinary habits. 57 y F with PMHx DM, HTN, CAD ( s/p CABG), COPD on home O2 , active smoker brought in [facility] from [facility] ) with complaints of shortness of breath, pleuritic chest pain and cough. According to the patient she was recently diagnosed with lung cancer.     Patient is uncooperative and does not recall what she was doing when the shortness of breath started. Says it started suddenly and has had similar episodes a few times in the past. SOB got progressively worse. She does not recall any alleviating or exacerbating factors. ( Is lying flat in on the stretcher in ED and does not appear to be in any distress.) Says that she has been having cough for the past couple of days, does not recall if is productive. Is not sure if had fever/ chills. She has not taken her medications for the last 2 days as she had not been feeling well.   In the ED she was CTA was negative for PE.  CXR did not not show any pathology. She was given 3x nebulizer treatments en route to the hospital- had symptomatic relief.   She was recently admitted to [facility] for shortness of breath and AMS and treated for COPD exacerbation.  According to the patient she was diagnosed as having lung cancer at [facility] but did not follow up with recommended therapy.   Denies any palpitations, change in bowel/ urinary habits. 57 y F with PMHx DM, HTN, CAD ( s/p CABG), COPD on home O2 , active smoker brought in [facility] from [facility] ) with complaints of shortness of breath, pleuritic chest pain and cough. According to the patient she was recently diagnosed with lung cancer.     Patient is uncooperative and does not recall what she was doing when the shortness of breath started. Says it started suddenly and has had similar episodes a few times in the past. SOB got progressively worse. She does not recall any alleviating or exacerbating factors. ( Is lying flat in on the stretcher in ED and does not appear to be in any distress.) Says that she has been having cough for the past couple of days, does not recall if is productive. Is not sure if had fever/ chills. She has not taken her medications for the last 2 days as she had not been feeling well.   In the ED she was CTA was negative for PE.  CXR did not not show any pathology. She was given 3x nebulizer treatments en route to the hospital- had symptomatic relief.   She was recently admitted to [facility] for shortness of breath and AMS and treated for COPD exacerbation.  According to the patient she was diagnosed as having lung cancer at [facility] but did not follow up with recommended therapy.   Denies any palpitations, change in bowel/ urinary habits.

## 2017-09-02 NOTE — H&P ADULT - PROBLEM SELECTOR PLAN 5
diagnosed at [facility] in April  Case management for close follow up

## 2017-09-03 DIAGNOSIS — I25.10 ATHEROSCLEROTIC HEART DISEASE OF NATIVE CORONARY ARTERY WITHOUT ANGINA PECTORIS: ICD-10-CM

## 2017-09-03 DIAGNOSIS — Z29.9 ENCOUNTER FOR PROPHYLACTIC MEASURES, UNSPECIFIED: ICD-10-CM

## 2017-09-03 DIAGNOSIS — K22.2 ESOPHAGEAL OBSTRUCTION: ICD-10-CM

## 2017-09-03 DIAGNOSIS — E11.9 TYPE 2 DIABETES MELLITUS WITHOUT COMPLICATIONS: ICD-10-CM

## 2017-09-03 DIAGNOSIS — C34.90 MALIGNANT NEOPLASM OF UNSPECIFIED PART OF UNSPECIFIED BRONCHUS OR LUNG: ICD-10-CM

## 2017-09-03 DIAGNOSIS — N17.9 ACUTE KIDNEY FAILURE, UNSPECIFIED: ICD-10-CM

## 2017-09-03 DIAGNOSIS — I10 ESSENTIAL (PRIMARY) HYPERTENSION: ICD-10-CM

## 2017-09-03 DIAGNOSIS — J44.1 CHRONIC OBSTRUCTIVE PULMONARY DISEASE WITH (ACUTE) EXACERBATION: ICD-10-CM

## 2017-09-03 LAB
BASOPHILS # BLD AUTO: 0.1 K/UL — SIGNIFICANT CHANGE UP (ref 0–0.2)
BASOPHILS NFR BLD AUTO: 2.2 % — HIGH (ref 0–2)
EOSINOPHIL # BLD AUTO: 0.3 K/UL — SIGNIFICANT CHANGE UP (ref 0–0.5)
EOSINOPHIL NFR BLD AUTO: 4.2 % — SIGNIFICANT CHANGE UP (ref 0–6)
HCT VFR BLD CALC: 38 % — SIGNIFICANT CHANGE UP (ref 34.5–45)
HGB BLD-MCNC: 12.6 G/DL — SIGNIFICANT CHANGE UP (ref 11.5–15.5)
LACTATE SERPL-SCNC: 1.3 MMOL/L — SIGNIFICANT CHANGE UP (ref 0.7–2)
LYMPHOCYTES # BLD AUTO: 2.2 K/UL — SIGNIFICANT CHANGE UP (ref 1–3.3)
LYMPHOCYTES # BLD AUTO: 35 % — SIGNIFICANT CHANGE UP (ref 13–44)
MAGNESIUM SERPL-MCNC: 1.6 MG/DL — SIGNIFICANT CHANGE UP (ref 1.6–2.6)
MCHC RBC-ENTMCNC: 28.3 PG — SIGNIFICANT CHANGE UP (ref 27–34)
MCHC RBC-ENTMCNC: 33.2 GM/DL — SIGNIFICANT CHANGE UP (ref 32–36)
MCV RBC AUTO: 85.4 FL — SIGNIFICANT CHANGE UP (ref 80–100)
MONOCYTES # BLD AUTO: 0.5 K/UL — SIGNIFICANT CHANGE UP (ref 0–0.9)
MONOCYTES NFR BLD AUTO: 7.1 % — SIGNIFICANT CHANGE UP (ref 2–14)
NEUTROPHILS # BLD AUTO: 3.3 K/UL — SIGNIFICANT CHANGE UP (ref 1.8–7.4)
NEUTROPHILS NFR BLD AUTO: 51.5 % — SIGNIFICANT CHANGE UP (ref 43–77)
PHOSPHATE SERPL-MCNC: 5 MG/DL — HIGH (ref 2.5–4.5)
PLATELET # BLD AUTO: 251 K/UL — SIGNIFICANT CHANGE UP (ref 150–400)
RBC # BLD: 4.46 M/UL — SIGNIFICANT CHANGE UP (ref 3.8–5.2)
RBC # FLD: 14.2 % — SIGNIFICANT CHANGE UP (ref 10.3–14.5)
T3 SERPL-MCNC: 111 NG/DL — SIGNIFICANT CHANGE UP (ref 80–200)
T4 AB SER-ACNC: 7.5 UG/DL — SIGNIFICANT CHANGE UP (ref 4.6–12)
TROPONIN I SERPL-MCNC: <0.015 NG/ML — SIGNIFICANT CHANGE UP (ref 0.01–0.04)
TSH SERPL-MCNC: 1.12 UIU/ML — SIGNIFICANT CHANGE UP (ref 0.36–3.74)
WBC # BLD: 6.4 K/UL — SIGNIFICANT CHANGE UP (ref 3.8–10.5)
WBC # FLD AUTO: 6.4 K/UL — SIGNIFICANT CHANGE UP (ref 3.8–10.5)

## 2017-09-03 RX ORDER — GABAPENTIN 400 MG/1
300 CAPSULE ORAL EVERY 8 HOURS
Qty: 0 | Refills: 0 | Status: DISCONTINUED | OUTPATIENT
Start: 2017-09-03 | End: 2017-09-07

## 2017-09-03 RX ORDER — GLUCAGON INJECTION, SOLUTION 0.5 MG/.1ML
1 INJECTION, SOLUTION SUBCUTANEOUS ONCE
Qty: 0 | Refills: 0 | Status: DISCONTINUED | OUTPATIENT
Start: 2017-09-03 | End: 2017-09-07

## 2017-09-03 RX ORDER — ATORVASTATIN CALCIUM 80 MG/1
40 TABLET, FILM COATED ORAL AT BEDTIME
Qty: 0 | Refills: 0 | Status: DISCONTINUED | OUTPATIENT
Start: 2017-09-03 | End: 2017-09-07

## 2017-09-03 RX ORDER — LISINOPRIL 2.5 MG/1
2.5 TABLET ORAL DAILY
Qty: 0 | Refills: 0 | Status: DISCONTINUED | OUTPATIENT
Start: 2017-09-03 | End: 2017-09-07

## 2017-09-03 RX ORDER — ACETAMINOPHEN 500 MG
650 TABLET ORAL ONCE
Qty: 0 | Refills: 0 | Status: COMPLETED | OUTPATIENT
Start: 2017-09-03 | End: 2017-09-03

## 2017-09-03 RX ORDER — OXYBUTYNIN CHLORIDE 5 MG
5 TABLET ORAL
Qty: 0 | Refills: 0 | Status: DISCONTINUED | OUTPATIENT
Start: 2017-09-03 | End: 2017-09-07

## 2017-09-03 RX ORDER — GABAPENTIN 400 MG/1
600 CAPSULE ORAL
Qty: 0 | Refills: 0 | Status: DISCONTINUED | OUTPATIENT
Start: 2017-09-03 | End: 2017-09-03

## 2017-09-03 RX ORDER — TIOTROPIUM BROMIDE 18 UG/1
1 CAPSULE ORAL; RESPIRATORY (INHALATION) DAILY
Qty: 0 | Refills: 0 | Status: DISCONTINUED | OUTPATIENT
Start: 2017-09-03 | End: 2017-09-07

## 2017-09-03 RX ORDER — OXYCODONE HYDROCHLORIDE 5 MG/1
5 TABLET ORAL ONCE
Qty: 0 | Refills: 0 | Status: DISCONTINUED | OUTPATIENT
Start: 2017-09-03 | End: 2017-09-04

## 2017-09-03 RX ORDER — CLOPIDOGREL BISULFATE 75 MG/1
75 TABLET, FILM COATED ORAL DAILY
Qty: 0 | Refills: 0 | Status: DISCONTINUED | OUTPATIENT
Start: 2017-09-03 | End: 2017-09-03

## 2017-09-03 RX ORDER — DEXTROSE 50 % IN WATER 50 %
25 SYRINGE (ML) INTRAVENOUS ONCE
Qty: 0 | Refills: 0 | Status: DISCONTINUED | OUTPATIENT
Start: 2017-09-03 | End: 2017-09-07

## 2017-09-03 RX ORDER — DEXTROSE 50 % IN WATER 50 %
12.5 SYRINGE (ML) INTRAVENOUS ONCE
Qty: 0 | Refills: 0 | Status: DISCONTINUED | OUTPATIENT
Start: 2017-09-03 | End: 2017-09-07

## 2017-09-03 RX ORDER — LANOLIN ALCOHOL/MO/W.PET/CERES
3 CREAM (GRAM) TOPICAL AT BEDTIME
Qty: 0 | Refills: 0 | Status: DISCONTINUED | OUTPATIENT
Start: 2017-09-03 | End: 2017-09-07

## 2017-09-03 RX ORDER — METOCLOPRAMIDE HCL 10 MG
5 TABLET ORAL ONCE
Qty: 0 | Refills: 0 | Status: COMPLETED | OUTPATIENT
Start: 2017-09-03 | End: 2017-09-03

## 2017-09-03 RX ORDER — INSULIN GLARGINE 100 [IU]/ML
25 INJECTION, SOLUTION SUBCUTANEOUS
Qty: 0 | Refills: 0 | Status: DISCONTINUED | OUTPATIENT
Start: 2017-09-03 | End: 2017-09-03

## 2017-09-03 RX ORDER — SODIUM CHLORIDE 9 MG/ML
1000 INJECTION INTRAMUSCULAR; INTRAVENOUS; SUBCUTANEOUS
Qty: 0 | Refills: 0 | Status: DISCONTINUED | OUTPATIENT
Start: 2017-09-03 | End: 2017-09-04

## 2017-09-03 RX ORDER — ACETAMINOPHEN 500 MG
650 TABLET ORAL ONCE
Qty: 0 | Refills: 0 | Status: DISCONTINUED | OUTPATIENT
Start: 2017-09-03 | End: 2017-09-07

## 2017-09-03 RX ORDER — ALBUTEROL 90 UG/1
2 AEROSOL, METERED ORAL EVERY 6 HOURS
Qty: 0 | Refills: 0 | Status: DISCONTINUED | OUTPATIENT
Start: 2017-09-03 | End: 2017-09-07

## 2017-09-03 RX ORDER — INSULIN LISPRO 100/ML
VIAL (ML) SUBCUTANEOUS AT BEDTIME
Qty: 0 | Refills: 0 | Status: DISCONTINUED | OUTPATIENT
Start: 2017-09-03 | End: 2017-09-07

## 2017-09-03 RX ORDER — FAMOTIDINE 10 MG/ML
20 INJECTION INTRAVENOUS DAILY
Qty: 0 | Refills: 0 | Status: DISCONTINUED | OUTPATIENT
Start: 2017-09-03 | End: 2017-09-07

## 2017-09-03 RX ORDER — MORPHINE SULFATE 50 MG/1
0.5 CAPSULE, EXTENDED RELEASE ORAL ONCE
Qty: 0 | Refills: 0 | Status: DISCONTINUED | OUTPATIENT
Start: 2017-09-03 | End: 2017-09-03

## 2017-09-03 RX ORDER — ENOXAPARIN SODIUM 100 MG/ML
40 INJECTION SUBCUTANEOUS EVERY 24 HOURS
Qty: 0 | Refills: 0 | Status: DISCONTINUED | OUTPATIENT
Start: 2017-09-03 | End: 2017-09-07

## 2017-09-03 RX ORDER — LOPERAMIDE HCL 2 MG
2 TABLET ORAL ONCE
Qty: 0 | Refills: 0 | Status: COMPLETED | OUTPATIENT
Start: 2017-09-03 | End: 2017-09-03

## 2017-09-03 RX ORDER — SODIUM CHLORIDE 9 MG/ML
1000 INJECTION, SOLUTION INTRAVENOUS
Qty: 0 | Refills: 0 | Status: DISCONTINUED | OUTPATIENT
Start: 2017-09-03 | End: 2017-09-07

## 2017-09-03 RX ORDER — DEXTROSE 50 % IN WATER 50 %
1 SYRINGE (ML) INTRAVENOUS ONCE
Qty: 0 | Refills: 0 | Status: DISCONTINUED | OUTPATIENT
Start: 2017-09-03 | End: 2017-09-07

## 2017-09-03 RX ORDER — IPRATROPIUM/ALBUTEROL SULFATE 18-103MCG
3 AEROSOL WITH ADAPTER (GRAM) INHALATION EVERY 6 HOURS
Qty: 0 | Refills: 0 | Status: DISCONTINUED | OUTPATIENT
Start: 2017-09-03 | End: 2017-09-07

## 2017-09-03 RX ORDER — INSULIN LISPRO 100/ML
VIAL (ML) SUBCUTANEOUS
Qty: 0 | Refills: 0 | Status: DISCONTINUED | OUTPATIENT
Start: 2017-09-03 | End: 2017-09-07

## 2017-09-03 RX ORDER — ASPIRIN/CALCIUM CARB/MAGNESIUM 324 MG
81 TABLET ORAL DAILY
Qty: 0 | Refills: 0 | Status: DISCONTINUED | OUTPATIENT
Start: 2017-09-03 | End: 2017-09-07

## 2017-09-03 RX ORDER — INSULIN GLARGINE 100 [IU]/ML
18 INJECTION, SOLUTION SUBCUTANEOUS
Qty: 0 | Refills: 0 | Status: DISCONTINUED | OUTPATIENT
Start: 2017-09-03 | End: 2017-09-07

## 2017-09-03 RX ADMIN — Medication 3 MILLILITER(S): at 09:14

## 2017-09-03 RX ADMIN — Medication 100 MILLIGRAM(S): at 06:05

## 2017-09-03 RX ADMIN — Medication 5 MILLIGRAM(S): at 05:21

## 2017-09-03 RX ADMIN — Medication 200 MILLIGRAM(S): at 21:43

## 2017-09-03 RX ADMIN — Medication 5 MILLIGRAM(S): at 18:24

## 2017-09-03 RX ADMIN — Medication 2: at 12:07

## 2017-09-03 RX ADMIN — Medication 5 MILLIGRAM(S): at 02:29

## 2017-09-03 RX ADMIN — Medication 4: at 21:42

## 2017-09-03 RX ADMIN — Medication 650 MILLIGRAM(S): at 02:29

## 2017-09-03 RX ADMIN — INSULIN GLARGINE 18 UNIT(S): 100 INJECTION, SOLUTION SUBCUTANEOUS at 21:42

## 2017-09-03 RX ADMIN — MORPHINE SULFATE 0.5 MILLIGRAM(S): 50 CAPSULE, EXTENDED RELEASE ORAL at 22:38

## 2017-09-03 RX ADMIN — GABAPENTIN 300 MILLIGRAM(S): 400 CAPSULE ORAL at 05:21

## 2017-09-03 RX ADMIN — GABAPENTIN 300 MILLIGRAM(S): 400 CAPSULE ORAL at 14:45

## 2017-09-03 RX ADMIN — Medication 2: at 18:23

## 2017-09-03 RX ADMIN — ENOXAPARIN SODIUM 40 MILLIGRAM(S): 100 INJECTION SUBCUTANEOUS at 05:21

## 2017-09-03 RX ADMIN — MORPHINE SULFATE 0.5 MILLIGRAM(S): 50 CAPSULE, EXTENDED RELEASE ORAL at 21:43

## 2017-09-03 RX ADMIN — Medication 2 MILLIGRAM(S): at 15:08

## 2017-09-03 RX ADMIN — Medication 5 MILLIGRAM(S): at 23:51

## 2017-09-03 RX ADMIN — CLOPIDOGREL BISULFATE 75 MILLIGRAM(S): 75 TABLET, FILM COATED ORAL at 12:13

## 2017-09-03 RX ADMIN — Medication 40 MILLIGRAM(S): at 12:12

## 2017-09-03 RX ADMIN — Medication 50 MILLIGRAM(S): at 21:41

## 2017-09-03 RX ADMIN — Medication 3 MILLIGRAM(S): at 21:42

## 2017-09-03 RX ADMIN — GABAPENTIN 300 MILLIGRAM(S): 400 CAPSULE ORAL at 21:41

## 2017-09-03 RX ADMIN — Medication 3 MILLILITER(S): at 20:40

## 2017-09-03 RX ADMIN — INSULIN GLARGINE 18 UNIT(S): 100 INJECTION, SOLUTION SUBCUTANEOUS at 08:41

## 2017-09-03 RX ADMIN — Medication 3: at 08:39

## 2017-09-03 RX ADMIN — SODIUM CHLORIDE 75 MILLILITER(S): 9 INJECTION INTRAMUSCULAR; INTRAVENOUS; SUBCUTANEOUS at 06:02

## 2017-09-03 RX ADMIN — Medication 5 MILLIGRAM(S): at 12:12

## 2017-09-03 RX ADMIN — Medication 3 MILLILITER(S): at 14:57

## 2017-09-03 RX ADMIN — ATORVASTATIN CALCIUM 40 MILLIGRAM(S): 80 TABLET, FILM COATED ORAL at 21:41

## 2017-09-03 RX ADMIN — Medication 81 MILLIGRAM(S): at 12:12

## 2017-09-03 RX ADMIN — FAMOTIDINE 20 MILLIGRAM(S): 10 INJECTION INTRAVENOUS at 12:12

## 2017-09-03 NOTE — CONSULT NOTE ADULT - SUBJECTIVE AND OBJECTIVE BOX
HPI:  58 yo woman was admited with SOB, CP, cough. Recent similar admission for COPD exacerbation. Questionable history of recent diagnosis of lung ca, which is being clarified. +hx of GERD and dysphagia. Esophagram notable for esophageal dysmotility and possible spasming.       PAST MEDICAL & SURGICAL HISTORY:  Lung cancer  COPD (chronic obstructive pulmonary disease)  No significant past surgical history      MEDICATIONS  (STANDING):  imipramine 50 milliGRAM(s) Oral at bedtime  atorvastatin 40 milliGRAM(s) Oral at bedtime  clopidogrel Tablet 75 milliGRAM(s) Oral daily  tiotropium 18 MICROgram(s) Capsule 1 Capsule(s) Inhalation daily  famotidine    Tablet 20 milliGRAM(s) Oral daily  oxybutynin 5 milliGRAM(s) Oral four times a day  aspirin enteric coated 81 milliGRAM(s) Oral daily  lisinopril 2.5 milliGRAM(s) Oral daily  ALBUTerol/ipratropium for Nebulization 3 milliLiter(s) Nebulizer every 6 hours  enoxaparin Injectable 40 milliGRAM(s) SubCutaneous every 24 hours  gabapentin 300 milliGRAM(s) Oral every 8 hours  sodium chloride 0.9%. 1000 milliLiter(s) (75 mL/Hr) IV Continuous <Continuous>  insulin glargine Injectable (LANTUS) 18 Unit(s) SubCutaneous two times a day  insulin lispro (HumaLOG) corrective regimen sliding scale   SubCutaneous three times a day before meals  insulin lispro (HumaLOG) corrective regimen sliding scale   SubCutaneous at bedtime  dextrose 5%. 1000 milliLiter(s) (50 mL/Hr) IV Continuous <Continuous>  dextrose 50% Injectable 12.5 Gram(s) IV Push once  dextrose 50% Injectable 25 Gram(s) IV Push once  dextrose 50% Injectable 25 Gram(s) IV Push once  methylPREDNISolone sodium succinate Injectable 40 milliGRAM(s) IV Push daily    MEDICATIONS  (PRN):  ALBUTerol    90 MICROgram(s) HFA Inhaler 2 Puff(s) Inhalation every 6 hours PRN Shortness of Breath and/or Wheezing  dextrose Gel 1 Dose(s) Oral once PRN Blood Glucose LESS THAN 70 milliGRAM(s)/deciliter  glucagon  Injectable 1 milliGRAM(s) IntraMuscular once PRN Glucose LESS THAN 70 milligrams/deciliter      Allergies    Dilaudid (Unknown)    Intolerances        SOCIAL HISTORY:  No smoking, social alcohol use, no recreational drug use    FAMILY HISTORY:  No pertinent family history in first degree relatives      REVIEW OF SYSTEMS    General: no fever    HEENT: no icterus    Respiratory and Thorax: as above  	  Cardiovascular: no CP    Gastrointestinal: as above    : no dysuria    Musculoskeletal: no myalgias    Skin: no jaundice    Neuro: no headaches or dizziness    Vital Signs Last 24 Hrs  T(C): 36.6 (03 Sep 2017 11:20), Max: 36.9 (03 Sep 2017 05:12)  T(F): 97.9 (03 Sep 2017 11:20), Max: 98.5 (03 Sep 2017 05:12)  HR: 92 (03 Sep 2017 11:20) (76 - 108)  BP: 129/54 (03 Sep 2017 11:20) (94/42 - 137/71)  BP(mean): 53 (03 Sep 2017 05:12) (53 - 53)  RR: 17 (03 Sep 2017 11:20) (17 - 21)  SpO2: 98% (03 Sep 2017 11:20) (94% - 100%)    PHYSICAL EXAM:    Constitutional: NAD    Head: NCAT    HEENT: anicteric    Neck: no abnormal lymphadenopathy    Cardiovascular:  RRR    Gastrointestinal: soft ND +BS NTTP    Extremities: no LE edema    Neuro: no focal deficits    Skin: no jaundice      LABS:                        12.6   6.4   )-----------( 251      ( 03 Sep 2017 04:00 )             38.0     09-02    135  |  100  |  28<H>  ----------------------------<  287<H>  4.1   |  23  |  1.46<H>    Ca    9.1      02 Sep 2017 22:27  Phos  5.0     09-03  Mg     1.6     09-03    TPro  7.1  /  Alb  3.6  /  TBili  0.5  /  DBili  x   /  AST  18  /  ALT  14  /  AlkPhos  100  09-02    PT/INR - ( 02 Sep 2017 18:58 )   PT: 12.6 sec;   INR: 1.16 ratio         PTT - ( 02 Sep 2017 18:58 )  PTT:32.0 sec  LIVER FUNCTIONS - ( 02 Sep 2017 18:58 )  Alb: 3.6 g/dL / Pro: 7.1 gm/dL / ALK PHOS: 100 U/L / ALT: 14 U/L / AST: 18 U/L / GGT: x             RADIOLOGY & ADDITIONAL STUDIES:

## 2017-09-03 NOTE — CONSULT NOTE ADULT - ASSESSMENT
58 yo woman admitted with COPD exacerbation  Dysphagia and GERD with suggestion of motility disorder on recent esophagram    Diet as toleraed  Continue PPI  Dr. Woodward returns tomorrow to determine timing of EGD  Hold plavix

## 2017-09-03 NOTE — PROGRESS NOTE ADULT - SUBJECTIVE AND OBJECTIVE BOX
Pt has been seen and examined with FP resident, resident supervised agree with a/p       Patient is a 57y old  Female who presents with a chief complaint of SOB (02 Sep 2017 23:47)        HPI:  57 y F with PMHx DM, HTN, CAD ( s/p CABG), COPD on home O2 , active smoker brought in [facility] from [facility] ) with complaints of shortness of breath, pleuritic chest pain and cough. As per patient she was recently diagnosed with lung cancer.         PHYSICAL EXAM:  Vital Signs Last 24 Hrs  T(C): 36.6 (03 Sep 2017 11:20), Max: 36.9 (03 Sep 2017 05:12)  T(F): 97.9 (03 Sep 2017 11:20), Max: 98.5 (03 Sep 2017 05:12)  HR: 92 (03 Sep 2017 11:20) (76 - 108)  BP: 129/54 (03 Sep 2017 11:20) (94/42 - 137/71)  BP(mean): 53 (03 Sep 2017 05:12) (53 - 53)  RR: 17 (03 Sep 2017 11:20) (17 - 21)  SpO2: 98% (03 Sep 2017 11:20) (94% - 100%)  general- comfortable   -rs-aeeb, prolonged expiration, mild wheeze present   -cvs-s1s2 normal   -p/a-soft,bs+  -extremity- no asymmetrical swelling noted   -cns- non focal         A/P    #AECOPD- start iv steroids, bronchodilator    #dvt pr

## 2017-09-03 NOTE — PROGRESS NOTE ADULT - SUBJECTIVE AND OBJECTIVE BOX
Pt is a 57 y F with PMHx DM, HTN, CAD ( s/p CABG), COPD who is an active smoker brought in [facility] from [facility]) with complaints of shortness of breath, pleuritic chest pain and dry non productive cough. According to the patient she was recently diagnosed with lung cancer at [facility], however she is uncooperative and doesn't recall when she was diagnosed. She reports her SOB started suddenly at the [facility]  and has had similar episodes a few times in the past. However, at the shelter her SOB got progressively worse and she doesn't remember any alleviating or exacerbating factors. In the ED she was CTA was negative for Pulmonary Embolism, Her CXR did not not show any pathology. She was given nebulizer treatments.     57 y F with PMHx DM, HTN, CAD ( s/p CABG), COPD on home O2 , active smoker brought in [facility] from [facility] ) with complaints of shortness of breath, pleuritic chest pain and cough. According to the patient she was recently diagnosed with lung cancer.     Patient is uncooperative and does not recall what she was doing when the shortness of breath started. Says it started suddenly and has had similar episodes a few times in the past. SOB got progressively worse. She does not recall any alleviating or exacerbating factors. ( Is lying flat in on the stretcher in ED and does not appear to be in any distress.) Says that she has been having cough for the past couple of days, does not recall if is productive. Is not sure if had fever/ chills. She has not taken her medications for the last 2 days as she had not been feeling well.   In the ED she was CTA was negative for PE.  CXR did not not show any pathology. She was given 3x nebulizer treatments en route to the hospital- had symptomatic relief.   She was recently admitted to [facility] for shortness of breath and AMS and treated for COPD exacerbation.  According to the patient she was diagnosed as having lung cancer at [facility] but did not follow up with recommended therapy.   Denies any palpitations, change in bowel/ urinary habits.  9/3/17: Pt was seen at bedside and still is c/o non productive dry cough and shortness of breath. She is in NAD and is resting comfortably. Pt denies any fever, headache, body aches, chills, chest congestion, chest pain, palpitations, nausea, vomiting, abdominal pain, diarrhea and constipation. Pt has no further complaints.     REVIEW OF SYSTEMS:  General: NAD, hemodynamically stable, (-)  fever, (-) chills, (-) weakness  HEENT:  Eyes:  No visual loss, blurred vision, double vision  sneezing, congestion, runny nose or sore throat.  SKIN:  No rash or itching.  CARDIOVASCULAR:  No chest pain, chest pressure or chest discomfort. No palpitations or edema.  RESPIRATORY:  + shortness of breath, intermittent dry non productive cough  GASTROINTESTINAL:  No anorexia, nausea, vomiting or diarrhea. No abdominal pain or blood.  NEUROLOGICAL:  No headache, dizziness, numbness or tingling in the extremities.   MUSCULOSKELETAL:  No muscle, back pain, joint pain or stiffness.  ENDOCRINOLOGIC:  No reports of sweating, cold or heat intolerance.   ALLERGIES:   hives, eczema or rhinitis.    PHYSICAL EXAM:  GENERAL: NAD,   HEAD:  NC/AT  EYES: EOMI, PERRLA, no scleral icterus  HEENT: Moist mucous membranes  NECK: Supple, No JVD  CNS:  Alert & Oriented X3,  LUNG: prolonged expiration and + wheezing, no ronchi, no rales,  HEART: RRR; No murmurs, rubs, or gallops  ABDOMEN: +BS, ST/ND/NT  EXTREMITIES:  2+ Peripheral Pulses, No clubbing, cyanosis, or b/l leg swelling noted.     Labs    CARDIAC MARKERS ( 03 Sep 2017 04:00 )  <0.015 ng/mL / x     / x     / x     / x      CARDIAC MARKERS ( 02 Sep 2017 22:27 )  <0.015 ng/mL / x     / x     / x     / x      CARDIAC MARKERS ( 02 Sep 2017 18:58 )  <0.015 ng/mL / x     / x     / x     / x                   12.6   6.4   )-----------( 251      ( 03 Sep 2017 04:00 )             38.0     02 Sep 2017 22:27    135    |  100    |  28     ----------------------------<  287    4.1     |  23     |  1.46     Ca    9.1        02 Sep 2017 22:27  Phos  5.0       03 Sep 2017 04:00  Mg     1.6       03 Sep 2017 04:00    TPro  7.1    /  Alb  3.6    /  TBili  0.5    /  DBili  x      /  AST  18     /  ALT  14     /  AlkPhos  100    02 Sep 2017 18:58    PT/INR - ( 02 Sep 2017 18:58 )   PT: 12.6 sec;   INR: 1.16 ratio         PTT - ( 02 Sep 2017 18:58 )  PTT:32.0 sec  CAPILLARY BLOOD GLUCOSE    LIVER FUNCTIONS - ( 02 Sep 2017 18:58 )  Alb: 3.6 g/dL / Pro: 7.1 gm/dL / ALK PHOS: 100 U/L / ALT: 14 U/L / AST: 18 U/L / GGT: x               Tests:     Rapid Respiratory Viral Panel (09.02.17 @ 21:31)    Rapid RVP Result: NotDetec: The [employer] RVP Rapid uses polymerase chain reaction (PCR) and melt  curve analysis to screen for adenovirus; coronavirus HKU1, NL63, 229E,  OC43; human metapneumovirus (hMPV); human enterovirus/rhinovirus  (Entero/RV); influenza A; influenza A/H1;NotDetec: influenza A/H3; influenza  A/H1-2009; influenza B; parainfluenza viruses 1, 2, 3, 4; respiratory  syncytial virus; Bordetella pertussis; Mycoplasma pneumoniae; and  Chlamydophila pneumoniae.      < from: CT Angio Chest PE Protocol w/ IV Cont (09.02.17 @ 22:19) >  IMPRESSION:  No pulmonary embolus. Mild diffuse centrilobular emphysema.    < end of copied text >        < from: 12 Lead ECG (09.02.17 @ 19:00) >  Diagnosis Line Sinus tachycardia with frequent Premature ventricular complexes  Nonspecific T wave abnormality  Abnormal ECG  When compared with ECG of 04-AUG-2017 22:39,  Premature ventricular complexes are now Present  Questionable change in QRS axis  Nonspecific T wave abnormality, worse in Lateral leads  Confirmed by MOODY RUIZ, KRYSTAL ([id number]) on 9/3/2017 2:21:06 PM    < end of copied text >

## 2017-09-04 LAB
ANION GAP SERPL CALC-SCNC: 8 MMOL/L — SIGNIFICANT CHANGE UP (ref 5–17)
BUN SERPL-MCNC: 33 MG/DL — HIGH (ref 7–23)
CALCIUM SERPL-MCNC: 8.8 MG/DL — SIGNIFICANT CHANGE UP (ref 8.5–10.1)
CHLORIDE SERPL-SCNC: 103 MMOL/L — SIGNIFICANT CHANGE UP (ref 96–108)
CO2 SERPL-SCNC: 27 MMOL/L — SIGNIFICANT CHANGE UP (ref 22–31)
CREAT SERPL-MCNC: 1.27 MG/DL — SIGNIFICANT CHANGE UP (ref 0.5–1.3)
GLUCOSE SERPL-MCNC: 146 MG/DL — HIGH (ref 70–99)
HCT VFR BLD CALC: 30.5 % — LOW (ref 34.5–45)
HGB BLD-MCNC: 10.3 G/DL — LOW (ref 11.5–15.5)
MCHC RBC-ENTMCNC: 28.6 PG — SIGNIFICANT CHANGE UP (ref 27–34)
MCHC RBC-ENTMCNC: 33.7 GM/DL — SIGNIFICANT CHANGE UP (ref 32–36)
MCV RBC AUTO: 84.8 FL — SIGNIFICANT CHANGE UP (ref 80–100)
PLATELET # BLD AUTO: 204 K/UL — SIGNIFICANT CHANGE UP (ref 150–400)
POTASSIUM SERPL-MCNC: 4.3 MMOL/L — SIGNIFICANT CHANGE UP (ref 3.5–5.3)
POTASSIUM SERPL-SCNC: 4.3 MMOL/L — SIGNIFICANT CHANGE UP (ref 3.5–5.3)
RBC # BLD: 3.6 M/UL — LOW (ref 3.8–5.2)
RBC # FLD: 13.9 % — SIGNIFICANT CHANGE UP (ref 10.3–14.5)
SODIUM SERPL-SCNC: 138 MMOL/L — SIGNIFICANT CHANGE UP (ref 135–145)
WBC # BLD: 8.2 K/UL — SIGNIFICANT CHANGE UP (ref 3.8–10.5)
WBC # FLD AUTO: 8.2 K/UL — SIGNIFICANT CHANGE UP (ref 3.8–10.5)

## 2017-09-04 RX ORDER — INSULIN LISPRO 100/ML
10 VIAL (ML) SUBCUTANEOUS ONCE
Qty: 0 | Refills: 0 | Status: COMPLETED | OUTPATIENT
Start: 2017-09-04 | End: 2017-09-04

## 2017-09-04 RX ADMIN — Medication 200 MILLIGRAM(S): at 05:27

## 2017-09-04 RX ADMIN — Medication 10 UNIT(S): at 00:38

## 2017-09-04 RX ADMIN — ENOXAPARIN SODIUM 40 MILLIGRAM(S): 100 INJECTION SUBCUTANEOUS at 05:26

## 2017-09-04 RX ADMIN — FAMOTIDINE 20 MILLIGRAM(S): 10 INJECTION INTRAVENOUS at 12:08

## 2017-09-04 RX ADMIN — INSULIN GLARGINE 18 UNIT(S): 100 INJECTION, SOLUTION SUBCUTANEOUS at 21:10

## 2017-09-04 RX ADMIN — Medication 3 MILLILITER(S): at 07:46

## 2017-09-04 RX ADMIN — Medication 2: at 09:07

## 2017-09-04 RX ADMIN — Medication 81 MILLIGRAM(S): at 12:07

## 2017-09-04 RX ADMIN — Medication 5 MILLIGRAM(S): at 05:26

## 2017-09-04 RX ADMIN — Medication 3 MILLILITER(S): at 13:47

## 2017-09-04 RX ADMIN — Medication 30 MILLIGRAM(S): at 14:19

## 2017-09-04 RX ADMIN — Medication 200 MILLIGRAM(S): at 19:59

## 2017-09-04 RX ADMIN — Medication 3 MILLILITER(S): at 02:21

## 2017-09-04 RX ADMIN — Medication 5 MILLIGRAM(S): at 12:08

## 2017-09-04 RX ADMIN — Medication 3: at 12:08

## 2017-09-04 RX ADMIN — Medication 50 MILLIGRAM(S): at 21:06

## 2017-09-04 RX ADMIN — Medication 40 MILLIGRAM(S): at 05:26

## 2017-09-04 RX ADMIN — OXYCODONE HYDROCHLORIDE 5 MILLIGRAM(S): 5 TABLET ORAL at 00:15

## 2017-09-04 RX ADMIN — Medication 3 MILLILITER(S): at 19:35

## 2017-09-04 RX ADMIN — ATORVASTATIN CALCIUM 40 MILLIGRAM(S): 80 TABLET, FILM COATED ORAL at 21:06

## 2017-09-04 RX ADMIN — Medication 5 MILLIGRAM(S): at 23:12

## 2017-09-04 RX ADMIN — GABAPENTIN 300 MILLIGRAM(S): 400 CAPSULE ORAL at 21:06

## 2017-09-04 RX ADMIN — INSULIN GLARGINE 18 UNIT(S): 100 INJECTION, SOLUTION SUBCUTANEOUS at 09:08

## 2017-09-04 RX ADMIN — Medication 5 MILLIGRAM(S): at 17:31

## 2017-09-04 RX ADMIN — GABAPENTIN 300 MILLIGRAM(S): 400 CAPSULE ORAL at 05:26

## 2017-09-04 RX ADMIN — OXYCODONE HYDROCHLORIDE 5 MILLIGRAM(S): 5 TABLET ORAL at 00:42

## 2017-09-04 RX ADMIN — GABAPENTIN 300 MILLIGRAM(S): 400 CAPSULE ORAL at 14:19

## 2017-09-04 RX ADMIN — LISINOPRIL 2.5 MILLIGRAM(S): 2.5 TABLET ORAL at 05:26

## 2017-09-04 RX ADMIN — Medication 2: at 21:11

## 2017-09-04 RX ADMIN — Medication 1: at 17:30

## 2017-09-04 NOTE — PROGRESS NOTE ADULT - SUBJECTIVE AND OBJECTIVE BOX
Patient is a 57y old  Female who presents with a chief complaint of SOB (02 Sep 2017 23:47)      Subective:  Feels good. Breathing a little better. On 3 L NC.    PAST MEDICAL & SURGICAL HISTORY:  Lung cancer  COPD (chronic obstructive pulmonary disease)  No significant past surgical history      MEDICATIONS  (STANDING):  imipramine 50 milliGRAM(s) Oral at bedtime  atorvastatin 40 milliGRAM(s) Oral at bedtime  tiotropium 18 MICROgram(s) Capsule 1 Capsule(s) Inhalation daily  famotidine    Tablet 20 milliGRAM(s) Oral daily  oxybutynin 5 milliGRAM(s) Oral four times a day  aspirin enteric coated 81 milliGRAM(s) Oral daily  lisinopril 2.5 milliGRAM(s) Oral daily  ALBUTerol/ipratropium for Nebulization 3 milliLiter(s) Nebulizer every 6 hours  enoxaparin Injectable 40 milliGRAM(s) SubCutaneous every 24 hours  gabapentin 300 milliGRAM(s) Oral every 8 hours  sodium chloride 0.9%. 1000 milliLiter(s) (75 mL/Hr) IV Continuous <Continuous>  insulin glargine Injectable (LANTUS) 18 Unit(s) SubCutaneous two times a day  insulin lispro (HumaLOG) corrective regimen sliding scale   SubCutaneous three times a day before meals  insulin lispro (HumaLOG) corrective regimen sliding scale   SubCutaneous at bedtime  dextrose 5%. 1000 milliLiter(s) (50 mL/Hr) IV Continuous <Continuous>  dextrose 50% Injectable 12.5 Gram(s) IV Push once  dextrose 50% Injectable 25 Gram(s) IV Push once  dextrose 50% Injectable 25 Gram(s) IV Push once  methylPREDNISolone sodium succinate Injectable 40 milliGRAM(s) IV Push daily    MEDICATIONS  (PRN):  ALBUTerol    90 MICROgram(s) HFA Inhaler 2 Puff(s) Inhalation every 6 hours PRN Shortness of Breath and/or Wheezing  dextrose Gel 1 Dose(s) Oral once PRN Blood Glucose LESS THAN 70 milliGRAM(s)/deciliter  glucagon  Injectable 1 milliGRAM(s) IntraMuscular once PRN Glucose LESS THAN 70 milligrams/deciliter  guaiFENesin   Syrup  (Sugar-Free) 200 milliGRAM(s) Oral every 6 hours PRN Cough  acetaminophen   Tablet. 650 milliGRAM(s) Oral once PRN Mild Pain (1 - 3)  melatonin 3 milliGRAM(s) Oral at bedtime PRN Insomnia      REVIEW OF SYSTEMS:    RESPIRATORY: No shortness of breath  CARDIOVASCULAR: No chest pain  All other review of systems is negative unless indicated above.    Vital Signs Last 24 Hrs  T(C): 36.7 (04 Sep 2017 11:05), Max: 36.7 (03 Sep 2017 17:06)  T(F): 98 (04 Sep 2017 11:05), Max: 98.1 (04 Sep 2017 04:44)  HR: 95 (04 Sep 2017 11:05) (77 - 783)  BP: 140/61 (04 Sep 2017 11:05) (111/60 - 140/61)  BP(mean): --  RR: 18 (04 Sep 2017 11:05) (17 - 18)  SpO2: 98% (04 Sep 2017 11:05) (96% - 100%)    PHYSICAL EXAM:    Constitutional: NAD, well-developed  Respiratory: CTAB  Cardiovascular: S1 and S2, RRR  Gastrointestinal: BS+, soft, NT/ND  Extremities: No peripheral edema  Psychiatric: Normal mood, normal affect    LABS:                        10.3   8.2   )-----------( 204      ( 04 Sep 2017 06:07 )             30.5     09-04    138  |  103  |  33<H>  ----------------------------<  146<H>  4.3   |  27  |  1.27    Ca    8.8      04 Sep 2017 06:07  Phos  5.0     09-03  Mg     1.6     09-03    TPro  7.1  /  Alb  3.6  /  TBili  0.5  /  DBili  x   /  AST  18  /  ALT  14  /  AlkPhos  100  09-02    PT/INR - ( 02 Sep 2017 18:58 )   PT: 12.6 sec;   INR: 1.16 ratio         PTT - ( 02 Sep 2017 18:58 )  PTT:32.0 sec  LIVER FUNCTIONS - ( 02 Sep 2017 18:58 )  Alb: 3.6 g/dL / Pro: 7.1 gm/dL / ALK PHOS: 100 U/L / ALT: 14 U/L / AST: 18 U/L / GGT: x             RADIOLOGY & ADDITIONAL STUDIES:

## 2017-09-04 NOTE — PROGRESS NOTE ADULT - ASSESSMENT
Imp:  Chronic dysphagia    Rec:  EGD this admission if possible, once breathing improves a little more  D/W patient who agrees

## 2017-09-04 NOTE — PROGRESS NOTE ADULT - PROBLEM SELECTOR PLAN 1
start patient on start on po antibiotics 30mg QD bronchodilator.  continue supplemental 02 start patient on start on po antibiotics 30mg QD bronchodilator.  continue supplemental 02  d/c IV fluids. VSS start patient on start on po prednisone 30mg QD bronchodilator.  continue supplemental 02  d/c IV fluids. VSS

## 2017-09-04 NOTE — PROGRESS NOTE ADULT - ASSESSMENT
Pt is a 57 y F with PMHx of  COPD who is an active smoker brought in [facility] from [facility]) with complaints of shortness of breath, pleuritic chest pain and dry non productive cough.

## 2017-09-04 NOTE — PROGRESS NOTE ADULT - SUBJECTIVE AND OBJECTIVE BOX
Pt is a 57 y F with PMHx DM, HTN, CAD ( s/p CABG), COPD who is an active smoker brought in [facility] from [facility]) with complaints of shortness of breath, pleuritic chest pain and dry non productive cough. According to the patient she was recently diagnosed with lung cancer at [facility], however she is uncooperative and doesn't recall when she was diagnosed. She reports her SOB started suddenly at the [facility]  and has had similar episodes a few times in the past. However, at the shelter her SOB got progressively worse and she doesn't remember any alleviating or exacerbating factors. In the ED she was CTA was negative for Pulmonary Embolism, Her CXR did not not show any pathology. She was given nebulizer treatments.     57 y F with PMHx DM, HTN, CAD ( s/p CABG), COPD on home O2 , active smoker brought in [facility] from [facility] ) with complaints of shortness of breath, pleuritic chest pain and cough. According to the patient she was recently diagnosed with lung cancer.     Patient is uncooperative and does not recall what she was doing when the shortness of breath started. Says it started suddenly and has had similar episodes a few times in the past. SOB got progressively worse. She does not recall any alleviating or exacerbating factors. ( Is lying flat in on the stretcher in ED and does not appear to be in any distress.) Says that she has been having cough for the past couple of days, does not recall if is productive. Is not sure if had fever/ chills. She has not taken her medications for the last 2 days as she had not been feeling well.   In the ED she was CTA was negative for PE.  CXR did not not show any pathology. She was given 3x nebulizer treatments en route to the hospital- had symptomatic relief.   She was recently admitted to [facility] for shortness of breath and AMS and treated for COPD exacerbation.  According to the patient she was diagnosed as having lung cancer at [facility] but did not follow up with recommended therapy.   Denies any palpitations, change in bowel/ urinary habits.  9/3/17: Pt was seen at bedside and still is c/o non productive dry cough and shortness of breath. She is in NAD and is resting comfortably. Pt denies any fever, headache, body aches, chills, chest congestion, chest pain, palpitations, nausea, vomiting, abdominal pain, diarrhea and constipation. Pt has no further complaints.     9/4/17: Pt was seen at beside and still is feeling tired, short of breath and has a minor dry non productive cough. She is in NAD and is resting comfortably. Pt denies any fever, headache, body aches, chills, chest congestion, chest pain, palpitations, nausea, vomiting, abdominal pain, diarrhea and constipation. Pt has no further complaints. VSS.    REVIEW OF SYSTEMS:  General: NAD, hemodynamically stable, (-)  fever, (-) chills, - weakness  HEENT:  Eyes:  No visual loss, blurred vision, double vision  sneezing, congestion, runny nose or sore throat.  SKIN:  No rash or itching.  CARDIOVASCULAR:  No chest pain, chest pressure or chest discomfort. No palpitations or edema.  RESPIRATORY:  + shortness of breath, intermittent dry non productive cough  GASTROINTESTINAL:  No anorexia, nausea, vomiting or diarrhea. No abdominal pain or blood.  NEUROLOGICAL:  No headache, dizziness, numbness or tingling in the extremities.   MUSCULOSKELETAL:  No muscle, back pain, joint pain or stiffness.  ENDOCRINOLOGIC:  No reports of sweating, cold or heat intolerance.   ALLERGIES:   hives, eczema or rhinitis.    PHYSICAL EXAM:  GENERAL: NAD,   HEAD:  NC/AT  EYES: EOMI, PERRLA, no scleral icterus  HEENT: Moist mucous membranes  NECK: Supple, No JVD  CNS:  Alert & Oriented X3,  LUNG: prolonged expiration and +mild wheezing, no ronchi, no rales,  HEART: RRR; No murmurs, rubs, or gallops  ABDOMEN: +BS, ST/ND/NT  EXTREMITIES:  2+ Peripheral Pulses, No clubbing, cyanosis, or b/l leg swelling noted.     Labs    CARDIAC MARKERS ( 03 Sep 2017 04:00 )  <0.015 ng/mL / x     / x     / x     / x      CARDIAC MARKERS ( 02 Sep 2017 22:27 )  <0.015 ng/mL / x     / x     / x     / x      CARDIAC MARKERS ( 02 Sep 2017 18:58 )  <0.015 ng/mL / x     / x     / x     / x        Labs    CARDIAC MARKERS ( 03 Sep 2017 04:00 )  <0.015 ng/mL / x     / x     / x     / x      CARDIAC MARKERS ( 02 Sep 2017 22:27 )  <0.015 ng/mL / x     / x     / x     / x      CARDIAC MARKERS ( 02 Sep 2017 18:58 )  <0.015 ng/mL / x     / x     / x     / x                                10.3   8.2   )-----------( 204      ( 04 Sep 2017 06:07 )             30.5     04 Sep 2017 06:07    138    |  103    |  33     ----------------------------<  146    4.3     |  27     |  1.27     Ca    8.8        04 Sep 2017 06:07  Phos  5.0       03 Sep 2017 04:00  Mg     1.6       03 Sep 2017 04:00    TPro  7.1    /  Alb  3.6    /  TBili  0.5    /  DBili  x      /  AST  18     /  ALT  14     /  AlkPhos  100    02 Sep 2017 18:58    PT/INR - ( 02 Sep 2017 18:58 )   PT: 12.6 sec;   INR: 1.16 ratio         PTT - ( 02 Sep 2017 18:58 )  PTT:32.0 sec  CAPILLARY BLOOD GLUCOSE  416 (03 Sep 2017 23:50)  439 (03 Sep 2017 21:14)        LIVER FUNCTIONS - ( 02 Sep 2017 18:58 )  Alb: 3.6 g/dL / Pro: 7.1 gm/dL / ALK PHOS: 100 U/L / ALT: 14 U/L / AST: 18 U/L / GGT: x             Tests:     Rapid Respiratory Viral Panel (09.02.17 @ 21:31)    Rapid RVP Result: NotDetec: The FilmArray RVP Rapid uses polymerase chain reaction (PCR) and melt  curve analysis to screen for adenovirus; coronavirus HKU1, NL63, 229E,  OC43; human metapneumovirus (hMPV); human enterovirus/rhinovirus  (Entero/RV); influenza A; influenza A/H1;NotDetec: influenza A/H3; influenza  A/H1-2009; influenza B; parainfluenza viruses 1, 2, 3, 4; respiratory  syncytial virus; Bordetella pertussis; Mycoplasma pneumoniae; and  Chlamydophila pneumoniae.      < from: CT Angio Chest PE Protocol w/ IV Cont (09.02.17 @ 22:19) >  IMPRESSION:  No pulmonary embolus. Mild diffuse centrilobular emphysema.    < end of copied text >    < from: 12 Lead ECG (09.02.17 @ 19:00) >  Diagnosis Line Sinus tachycardia with frequent Premature ventricular complexes  Nonspecific T wave abnormality  Abnormal ECG  When compared with ECG of 04-AUG-2017 22:39,  Premature ventricular complexes are now Present  Questionable change in QRS axis  Nonspecific T wave abnormality, worse in Lateral leads  Confirmed by MOODY RUIZ, KRYSTAL ([id number]) on 9/3/2017 2:21:06 PM    < end of copied text > Pt is a 57 y F with PMHx DM, HTN, CAD ( s/p CABG), COPD who is an active smoker brought in [facility] from [facility]) with complaints of shortness of breath, pleuritic chest pain and dry non productive cough. According to the patient she was recently diagnosed with lung cancer at [facility], however she is uncooperative and doesn't recall when she was diagnosed. She reports her SOB started suddenly at the [facility]  and has had similar episodes a few times in the past. However, at the shelter her SOB got progressively worse and she doesn't remember any alleviating or exacerbating factors. In the ED she was CTA was negative for Pulmonary Embolism, Her CXR did not not show any pathology. She was given nebulizer treatments.     57 y F with PMHx DM, HTN, CAD ( s/p CABG), COPD on home O2 , active smoker brought in [facility] from [facility] ) with complaints of shortness of breath, pleuritic chest pain and cough. According to the patient she was recently diagnosed with lung cancer.     Patient is uncooperative and does not recall what she was doing when the shortness of breath started. Says it started suddenly and has had similar episodes a few times in the past. SOB got progressively worse. She does not recall any alleviating or exacerbating factors. ( Is lying flat in on the stretcher in ED and does not appear to be in any distress.) Says that she has been having cough for the past couple of days, does not recall if is productive. Is not sure if had fever/ chills. She has not taken her medications for the last 2 days as she had not been feeling well.   In the ED she was CTA was negative for PE.  CXR did not not show any pathology. She was given 3x nebulizer treatments en route to the hospital- had symptomatic relief.   She was recently admitted to [facility] for shortness of breath and AMS and treated for COPD exacerbation.  According to the patient she was diagnosed as having lung cancer at [facility] [place] but did not follow up with recommended therapy.   Denies any palpitations, change in bowel/ urinary habits.  9/3/17: Pt was seen at bedside and still is c/o non productive dry cough and shortness of breath. She is in NAD and is resting comfortably. Pt denies any fever, headache, body aches, chills, chest congestion, chest pain, palpitations, nausea, vomiting, abdominal pain, diarrhea and constipation. Pt has no further complaints.     9/4/17: Pt was seen at beside and still is feeling tired, short of breath and has a minor dry non productive cough. She is in NAD and is resting comfortably. Pt denies any fever, headache, body aches, chills, chest congestion, chest pain, palpitations, nausea, vomiting, abdominal pain, diarrhea and constipation. Pt has no further complaints. VSS. IV fluids were discontinued.     REVIEW OF SYSTEMS:  General: NAD, hemodynamically stable, (-)  fever, (-) chills, - weakness  HEENT:  Eyes:  No visual loss, blurred vision, double vision  sneezing, congestion, runny nose or sore throat.  SKIN:  No rash or itching.  CARDIOVASCULAR:  No chest pain, chest pressure or chest discomfort. No palpitations or edema.  RESPIRATORY:  + shortness of breath, intermittent dry non productive cough  GASTROINTESTINAL:  No anorexia, nausea, vomiting or diarrhea. No abdominal pain or blood.  NEUROLOGICAL:  No headache, dizziness, numbness or tingling in the extremities.   MUSCULOSKELETAL:  No muscle, back pain, joint pain or stiffness.  ENDOCRINOLOGIC:  No reports of sweating, cold or heat intolerance.   ALLERGIES:   hives, eczema or rhinitis.    PHYSICAL EXAM:  GENERAL: NAD,   HEAD:  NC/AT  EYES: EOMI, PERRLA, no scleral icterus  HEENT: Moist mucous membranes  NECK: Supple, No JVD  CNS:  Alert & Oriented X3,  LUNG: prolonged expiration and +mild wheezing, no ronchi, no rales,  HEART: RRR; No murmurs, rubs, or gallops  ABDOMEN: +BS, ST/ND/NT  EXTREMITIES:  2+ Peripheral Pulses, No clubbing, cyanosis, or b/l leg swelling noted.     Labs    CARDIAC MARKERS ( 03 Sep 2017 04:00 )  <0.015 ng/mL / x     / x     / x     / x      CARDIAC MARKERS ( 02 Sep 2017 22:27 )  <0.015 ng/mL / x     / x     / x     / x      CARDIAC MARKERS ( 02 Sep 2017 18:58 )  <0.015 ng/mL / x     / x     / x     / x        Labs    CARDIAC MARKERS ( 03 Sep 2017 04:00 )  <0.015 ng/mL / x     / x     / x     / x      CARDIAC MARKERS ( 02 Sep 2017 22:27 )  <0.015 ng/mL / x     / x     / x     / x      CARDIAC MARKERS ( 02 Sep 2017 18:58 )  <0.015 ng/mL / x     / x     / x     / x                                10.3   8.2   )-----------( 204      ( 04 Sep 2017 06:07 )             30.5     04 Sep 2017 06:07    138    |  103    |  33     ----------------------------<  146    4.3     |  27     |  1.27     Ca    8.8        04 Sep 2017 06:07  Phos  5.0       03 Sep 2017 04:00  Mg     1.6       03 Sep 2017 04:00    TPro  7.1    /  Alb  3.6    /  TBili  0.5    /  DBili  x      /  AST  18     /  ALT  14     /  AlkPhos  100    02 Sep 2017 18:58    PT/INR - ( 02 Sep 2017 18:58 )   PT: 12.6 sec;   INR: 1.16 ratio         PTT - ( 02 Sep 2017 18:58 )  PTT:32.0 sec  CAPILLARY BLOOD GLUCOSE  416 (03 Sep 2017 23:50)  439 (03 Sep 2017 21:14)        LIVER FUNCTIONS - ( 02 Sep 2017 18:58 )  Alb: 3.6 g/dL / Pro: 7.1 gm/dL / ALK PHOS: 100 U/L / ALT: 14 U/L / AST: 18 U/L / GGT: x             Tests:     Rapid Respiratory Viral Panel (09.02.17 @ 21:31)    Rapid RVP Result: NotDetec: The FilmArray RVP Rapid uses polymerase chain reaction (PCR) and melt  curve analysis to screen for adenovirus; coronavirus HKU1, NL63, 229E,  OC43; human metapneumovirus (hMPV); human enterovirus/rhinovirus  (Entero/RV); influenza A; influenza A/H1;NotDetec: influenza A/H3; influenza  A/H1-2009; influenza B; parainfluenza viruses 1, 2, 3, 4; respiratory  syncytial virus; Bordetella pertussis; Mycoplasma pneumoniae; and  Chlamydophila pneumoniae.      < from: CT Angio Chest PE Protocol w/ IV Cont (09.02.17 @ 22:19) >  IMPRESSION:  No pulmonary embolus. Mild diffuse centrilobular emphysema.    < end of copied text >    < from: 12 Lead ECG (09.02.17 @ 19:00) >  Diagnosis Line Sinus tachycardia with frequent Premature ventricular complexes  Nonspecific T wave abnormality  Abnormal ECG  When compared with ECG of 04-AUG-2017 22:39,  Premature ventricular complexes are now Present  Questionable change in QRS axis  Nonspecific T wave abnormality, worse in Lateral leads  Confirmed by MOODY RUIZ, KRYSTAL ([id number]) on 9/3/2017 2:21:06 PM    < end of copied text >

## 2017-09-04 NOTE — PROGRESS NOTE ADULT - SUBJECTIVE AND OBJECTIVE BOX
Pt has been seen and examined with FP resident, resident supervised agree with a/p       Patient is a 57y old  Female who presents with a chief complaint of SOB (02 Sep 2017 23:47)        HPI:  57 y F with PMHx DM, HTN, CAD ( s/p CABG), COPD on home O2 , active smoker brought in [facility] from [facility] ) with complaints of shortness of breath, pleuritic chest pain and cough. As per patient she was recently diagnosed with lung cancer.         PHYSICAL EXAM:  vitals-stable   general- comfortable   -rs-aeeb, prolonged expiration, mild wheeze present   -cvs-s1s2 normal   -p/a-soft,bs+  -extremity- no asymmetrical swelling noted   -cns- non focal         A/P    #AECOPD- ct steroids, bronchodilator, change to po steroids     #Dysphagia - possible EGD during this admission as per Dr. Woodward     #dvt pr

## 2017-09-05 DIAGNOSIS — R13.10 DYSPHAGIA, UNSPECIFIED: ICD-10-CM

## 2017-09-05 RX ORDER — INSULIN LISPRO 100/ML
4 VIAL (ML) SUBCUTANEOUS ONCE
Qty: 0 | Refills: 0 | Status: COMPLETED | OUTPATIENT
Start: 2017-09-05 | End: 2017-09-05

## 2017-09-05 RX ORDER — METOCLOPRAMIDE HCL 10 MG
10 TABLET ORAL
Qty: 0 | Refills: 0 | Status: DISCONTINUED | OUTPATIENT
Start: 2017-09-05 | End: 2017-09-07

## 2017-09-05 RX ORDER — ONDANSETRON 8 MG/1
4 TABLET, FILM COATED ORAL EVERY 6 HOURS
Qty: 0 | Refills: 0 | Status: DISCONTINUED | OUTPATIENT
Start: 2017-09-05 | End: 2017-09-07

## 2017-09-05 RX ADMIN — Medication 50 MILLIGRAM(S): at 21:09

## 2017-09-05 RX ADMIN — ATORVASTATIN CALCIUM 40 MILLIGRAM(S): 80 TABLET, FILM COATED ORAL at 21:09

## 2017-09-05 RX ADMIN — Medication 5 MILLIGRAM(S): at 11:55

## 2017-09-05 RX ADMIN — INSULIN GLARGINE 18 UNIT(S): 100 INJECTION, SOLUTION SUBCUTANEOUS at 21:08

## 2017-09-05 RX ADMIN — Medication 4: at 11:52

## 2017-09-05 RX ADMIN — Medication 10 MILLIGRAM(S): at 17:08

## 2017-09-05 RX ADMIN — ONDANSETRON 4 MILLIGRAM(S): 8 TABLET, FILM COATED ORAL at 08:44

## 2017-09-05 RX ADMIN — LISINOPRIL 2.5 MILLIGRAM(S): 2.5 TABLET ORAL at 05:46

## 2017-09-05 RX ADMIN — GABAPENTIN 300 MILLIGRAM(S): 400 CAPSULE ORAL at 15:15

## 2017-09-05 RX ADMIN — GABAPENTIN 300 MILLIGRAM(S): 400 CAPSULE ORAL at 21:09

## 2017-09-05 RX ADMIN — Medication 30 MILLIGRAM(S): at 05:46

## 2017-09-05 RX ADMIN — Medication 200 MILLIGRAM(S): at 11:56

## 2017-09-05 RX ADMIN — Medication 3 MILLILITER(S): at 02:16

## 2017-09-05 RX ADMIN — ENOXAPARIN SODIUM 40 MILLIGRAM(S): 100 INJECTION SUBCUTANEOUS at 05:46

## 2017-09-05 RX ADMIN — Medication 3: at 08:39

## 2017-09-05 RX ADMIN — Medication 5 MILLIGRAM(S): at 05:46

## 2017-09-05 RX ADMIN — Medication 3 MILLILITER(S): at 08:40

## 2017-09-05 RX ADMIN — Medication 5 MILLIGRAM(S): at 23:03

## 2017-09-05 RX ADMIN — Medication 5 MILLIGRAM(S): at 17:10

## 2017-09-05 RX ADMIN — GABAPENTIN 300 MILLIGRAM(S): 400 CAPSULE ORAL at 05:46

## 2017-09-05 RX ADMIN — TIOTROPIUM BROMIDE 1 CAPSULE(S): 18 CAPSULE ORAL; RESPIRATORY (INHALATION) at 08:40

## 2017-09-05 RX ADMIN — Medication: at 17:10

## 2017-09-05 RX ADMIN — FAMOTIDINE 20 MILLIGRAM(S): 10 INJECTION INTRAVENOUS at 11:51

## 2017-09-05 RX ADMIN — Medication 4: at 21:08

## 2017-09-05 RX ADMIN — Medication 4 UNIT(S): at 22:48

## 2017-09-05 RX ADMIN — INSULIN GLARGINE 18 UNIT(S): 100 INJECTION, SOLUTION SUBCUTANEOUS at 08:39

## 2017-09-05 RX ADMIN — Medication 81 MILLIGRAM(S): at 11:55

## 2017-09-05 RX ADMIN — Medication 3 MILLILITER(S): at 19:57

## 2017-09-05 NOTE — PROGRESS NOTE ADULT - SUBJECTIVE AND OBJECTIVE BOX
Pt is a 57 y F with PMHx DM, HTN, CAD ( s/p CABG), COPD who is an active smoker brought in [facility] from [facility]) with complaints of shortness of breath, pleuritic chest pain and dry non productive cough. According to the patient she was recently diagnosed with lung cancer at [facility], however she is uncooperative and doesn't recall when she was diagnosed. She reports her SOB started suddenly at the [facility]  and has had similar episodes a few times in the past. However, at the shelter her SOB got progressively worse and she doesn't remember any alleviating or exacerbating factors. In the ED she was CTA was negative for Pulmonary Embolism, Her CXR did not not show any pathology. She was given nebulizer treatments.     57 y F with PMHx DM, HTN, CAD ( s/p CABG), COPD on home O2 , active smoker brought in [facility] from [facility] ) with complaints of shortness of breath, pleuritic chest pain and cough. According to the patient she was recently diagnosed with lung cancer.     Patient is uncooperative and does not recall what she was doing when the shortness of breath started. Says it started suddenly and has had similar episodes a few times in the past. SOB got progressively worse. She does not recall any alleviating or exacerbating factors. ( Is lying flat in on the stretcher in ED and does not appear to be in any distress.) Says that she has been having cough for the past couple of days, does not recall if is productive. Is not sure if had fever/ chills. She has not taken her medications for the last 2 days as she had not been feeling well.   In the ED she was CTA was negative for PE.  CXR did not not show any pathology. She was given 3x nebulizer treatments en route to the hospital- had symptomatic relief.   She was recently admitted to [facility] for shortness of breath and AMS and treated for COPD exacerbation.  According to the patient she was diagnosed as having lung cancer at [facility] but did not follow up with recommended therapy.   Denies any palpitations, change in bowel/ urinary habits.  9/3/17: Pt was seen at bedside and still is c/o non productive dry cough and shortness of breath. She is in NAD and is resting comfortably. Pt denies any fever, headache, body aches, chills, chest congestion, chest pain, palpitations, nausea, vomiting, abdominal pain, diarrhea and constipation. Pt has no further complaints.     9/4/17: Pt was seen at beside and still is feeling tired, short of breath and has a minor dry non productive cough. She is in NAD and is resting comfortably. Pt denies any fever, headache, body aches, chills, chest congestion, chest pain, palpitations, nausea, vomiting, abdominal pain, diarrhea and constipation. Pt has no further complaints. VSS. IV fluids were discontinued.     9/5/17: Pt was seen at bedside and is looking better compared to yesterday. she is resting in bed comfortably and is in NAD. Pt denies any fever, headaches, body aches, chills, chest congestion, chest pain, palpitations, nausea, vomiting, abdominal pain, diarrhea and constipation. Pt has no further complaints. VSS.     REVIEW OF SYSTEMS:  General: NAD, hemodynamically stable, (-)  fever, (-) chills, - weakness  HEENT:  Eyes:  No visual loss, blurred vision, double vision  sneezing, congestion, runny nose or sore throat.  SKIN:  No rash or itching.  CARDIOVASCULAR:  No chest pain, chest pressure or chest discomfort. No palpitations or edema.  RESPIRATORY:  + shortness of breath, intermittent dry non productive cough  GASTROINTESTINAL:  No anorexia, nausea, vomiting or diarrhea. No abdominal pain or blood.  NEUROLOGICAL:  No headache, dizziness, numbness or tingling in the extremities.   MUSCULOSKELETAL:  No muscle, back pain, joint pain or stiffness.  ENDOCRINOLOGIC:  No reports of sweating, cold or heat intolerance.   ALLERGIES:   hives, eczema or rhinitis.    ICU Vital Signs Last 24 Hrs  T(C): 37.1 (05 Sep 2017 11:15), Max: 37.1 (05 Sep 2017 11:15)  T(F): 98.7 (05 Sep 2017 11:15), Max: 98.7 (05 Sep 2017 11:15)  HR: 96 (05 Sep 2017 11:15) (79 - 96)  BP: 131/95 (05 Sep 2017 11:15) (110/47 - 139/62)  RR: 18 (05 Sep 2017 11:15) (17 - 19)  SpO2: 97% (05 Sep 2017 11:15) (96% - 100%)    PHYSICAL EXAM:  GENERAL: NAD,   HEAD:  NC/AT  EYES: EOMI, PERRLA, no scleral icterus  HEENT: Moist mucous membranes  NECK: Supple, No JVD  CNS:  Alert & Oriented X3,  LUNG: prolonged expiration and +mild wheezing (much better from yesterday), no ronchi, no rales,  HEART: RRR; No murmurs, rubs, or gallops  ABDOMEN: +BS, ST/ND/NT  EXTREMITIES:  2+ Peripheral Pulses, No clubbing, cyanosis, or b/l leg swelling noted.     Labs    CARDIAC MARKERS ( 03 Sep 2017 04:00 )  <0.015 ng/mL / x     / x     / x     / x      CARDIAC MARKERS ( 02 Sep 2017 22:27 )  <0.015 ng/mL / x     / x     / x     / x      CARDIAC MARKERS ( 02 Sep 2017 18:58 )  <0.015 ng/mL / x     / x     / x     / x        Labs                          10.3   8.2   )-----------( 204      ( 04 Sep 2017 06:07 )             30.5     04 Sep 2017 06:07    138    |  103    |  33     ----------------------------<  146    4.3     |  27     |  1.27     Ca    8.8        04 Sep 2017 06:07        CAPILLARY BLOOD GLUCOSE  313 (04 Sep 2017 21:14)          Tests:     Rapid Respiratory Viral Panel (09.02.17 @ 21:31)    Rapid RVP Result: NotDete: The FilmArray RVP Rapid uses polymerase chain reaction (PCR) and melt  curve analysis to screen for adenovirus; coronavirus HKU1, NL63, 229E,  OC43; human metapneumovirus (hMPV); human enterovirus/rhinovirus  (Entero/RV); influenza A; influenza A/H1;NotDetec: influenza A/H3; influenza  A/H1-2009; influenza B; parainfluenza viruses 1, 2, 3, 4; respiratory  syncytial virus; Bordetella pertussis; Mycoplasma pneumoniae; and  Chlamydophila pneumoniae.      < from: CT Angio Chest PE Protocol w/ IV Cont (09.02.17 @ 22:19) >  IMPRESSION:  No pulmonary embolus. Mild diffuse centrilobular emphysema.    < end of copied text >    < from: 12 Lead ECG (09.02.17 @ 19:00) >  Diagnosis Line Sinus tachycardia with frequent Premature ventricular complexes  Nonspecific T wave abnormality  Abnormal ECG  When compared with ECG of 04-AUG-2017 22:39,  Premature ventricular complexes are now Present  Questionable change in QRS axis  Nonspecific T wave abnormality, worse in Lateral leads  Confirmed by MOODY RUIZ, KRYSTAL ([id number]) on 9/3/2017 2:21:06 PM    < end of copied text >

## 2017-09-05 NOTE — PROGRESS NOTE ADULT - ASSESSMENT
Pt is a 57 y F with PMHx of  COPD who is an active smoker brought in [facility] from [facility]) with complaints of shortness of breath, pleuritic chest pain and dry non productive cough.

## 2017-09-05 NOTE — PROGRESS NOTE ADULT - SUBJECTIVE AND OBJECTIVE BOX
Patient is a 57y old  Female who presents with a chief complaint of SOB (02 Sep 2017 23:47)      Subective:  C/O nausea  Still coughing a lot    PAST MEDICAL & SURGICAL HISTORY:  Lung cancer  COPD (chronic obstructive pulmonary disease)  No significant past surgical history      MEDICATIONS  (STANDING):  imipramine 50 milliGRAM(s) Oral at bedtime  atorvastatin 40 milliGRAM(s) Oral at bedtime  tiotropium 18 MICROgram(s) Capsule 1 Capsule(s) Inhalation daily  famotidine    Tablet 20 milliGRAM(s) Oral daily  oxybutynin 5 milliGRAM(s) Oral four times a day  aspirin enteric coated 81 milliGRAM(s) Oral daily  lisinopril 2.5 milliGRAM(s) Oral daily  ALBUTerol/ipratropium for Nebulization 3 milliLiter(s) Nebulizer every 6 hours  enoxaparin Injectable 40 milliGRAM(s) SubCutaneous every 24 hours  gabapentin 300 milliGRAM(s) Oral every 8 hours  insulin glargine Injectable (LANTUS) 18 Unit(s) SubCutaneous two times a day  insulin lispro (HumaLOG) corrective regimen sliding scale   SubCutaneous three times a day before meals  insulin lispro (HumaLOG) corrective regimen sliding scale   SubCutaneous at bedtime  dextrose 5%. 1000 milliLiter(s) (50 mL/Hr) IV Continuous <Continuous>  dextrose 50% Injectable 12.5 Gram(s) IV Push once  dextrose 50% Injectable 25 Gram(s) IV Push once  dextrose 50% Injectable 25 Gram(s) IV Push once  predniSONE   Tablet 30 milliGRAM(s) Oral daily    MEDICATIONS  (PRN):  ALBUTerol    90 MICROgram(s) HFA Inhaler 2 Puff(s) Inhalation every 6 hours PRN Shortness of Breath and/or Wheezing  dextrose Gel 1 Dose(s) Oral once PRN Blood Glucose LESS THAN 70 milliGRAM(s)/deciliter  glucagon  Injectable 1 milliGRAM(s) IntraMuscular once PRN Glucose LESS THAN 70 milligrams/deciliter  guaiFENesin   Syrup  (Sugar-Free) 200 milliGRAM(s) Oral every 6 hours PRN Cough  acetaminophen   Tablet. 650 milliGRAM(s) Oral once PRN Mild Pain (1 - 3)  melatonin 3 milliGRAM(s) Oral at bedtime PRN Insomnia  ondansetron Injectable 4 milliGRAM(s) IV Push every 6 hours PRN Nausea and/or Vomiting      REVIEW OF SYSTEMS:    RESPIRATORY: +cough  CARDIOVASCULAR: No chest pain  All other review of systems is negative unless indicated above.    Vital Signs Last 24 Hrs  T(C): 36.6 (05 Sep 2017 05:23), Max: 36.9 (04 Sep 2017 17:16)  T(F): 97.9 (05 Sep 2017 05:23), Max: 98.4 (04 Sep 2017 17:16)  HR: 89 (05 Sep 2017 05:23) (79 - 95)  BP: 139/62 (05 Sep 2017 05:23) (110/47 - 140/61)  BP(mean): --  RR: 19 (05 Sep 2017 05:23) (17 - 19)  SpO2: 98% (05 Sep 2017 05:23) (97% - 100%)    PHYSICAL EXAM:    Constitutional: NAD, well-developed  Respiratory: CTAB  Cardiovascular: S1 and S2, RRR  Gastrointestinal: BS+, soft, NT/ND  Extremities: No peripheral edema  Psychiatric: Normal mood, normal affect    LABS:                        10.3   8.2   )-----------( 204      ( 04 Sep 2017 06:07 )             30.5     09-04    138  |  103  |  33<H>  ----------------------------<  146<H>  4.3   |  27  |  1.27    Ca    8.8      04 Sep 2017 06:07            RADIOLOGY & ADDITIONAL STUDIES:

## 2017-09-05 NOTE — PROGRESS NOTE ADULT - PROBLEM SELECTOR PLAN 1
start patient on start on po prednisone 30mg QD bronchodilator.  continue supplemental 02  d/c IV fluids. VSS

## 2017-09-05 NOTE — PROGRESS NOTE ADULT - ASSESSMENT
Imp:  COPD flare  chronic dysphagia    Rec:  Add reglan per patient request  Await improvement in COPD prior to EGD

## 2017-09-05 NOTE — PROGRESS NOTE ADULT - SUBJECTIVE AND OBJECTIVE BOX
Pt has been seen and examined with FP resident, resident supervised agree with a/p       Patient is a 57y old  Female who presents with a chief complaint of SOB (02 Sep 2017 23:47)        HPI:  57 y F with PMHx DM, HTN, CAD ( s/p CABG), COPD on home O2 , active smoker brought in [facility] from [facility] ) with complaints of shortness of breath, pleuritic chest pain and cough. As per patient she was recently diagnosed with lung cancer.         PHYSICAL EXAM:  vitals-stable   general- comfortable   -rs-aeeb, prolonged expiration, mild wheeze present -better on exam today   -cvs-s1s2 normal   -p/a-soft,bs+  -extremity- no asymmetrical swelling noted   -cns- non focal         A/P    #AECOPD- ct steroids, bronchodilator, monitor her   -can transfer out of [facility]     #Dysphagia - possible EGD during this admission as per Dr. Woodward once copd will be under control     #dvt pr

## 2017-09-06 LAB
ANION GAP SERPL CALC-SCNC: 7 MMOL/L — SIGNIFICANT CHANGE UP (ref 5–17)
BUN SERPL-MCNC: 34 MG/DL — HIGH (ref 7–23)
CALCIUM SERPL-MCNC: 9.7 MG/DL — SIGNIFICANT CHANGE UP (ref 8.5–10.1)
CHLORIDE SERPL-SCNC: 104 MMOL/L — SIGNIFICANT CHANGE UP (ref 96–108)
CO2 SERPL-SCNC: 25 MMOL/L — SIGNIFICANT CHANGE UP (ref 22–31)
CREAT SERPL-MCNC: 1.08 MG/DL — SIGNIFICANT CHANGE UP (ref 0.5–1.3)
GLUCOSE SERPL-MCNC: 157 MG/DL — HIGH (ref 70–99)
HCT VFR BLD CALC: 38.8 % — SIGNIFICANT CHANGE UP (ref 34.5–45)
HGB BLD-MCNC: 13.1 G/DL — SIGNIFICANT CHANGE UP (ref 11.5–15.5)
MCHC RBC-ENTMCNC: 28.8 PG — SIGNIFICANT CHANGE UP (ref 27–34)
MCHC RBC-ENTMCNC: 33.8 GM/DL — SIGNIFICANT CHANGE UP (ref 32–36)
MCV RBC AUTO: 85.4 FL — SIGNIFICANT CHANGE UP (ref 80–100)
PLATELET # BLD AUTO: 234 K/UL — SIGNIFICANT CHANGE UP (ref 150–400)
POTASSIUM SERPL-MCNC: 4.7 MMOL/L — SIGNIFICANT CHANGE UP (ref 3.5–5.3)
POTASSIUM SERPL-SCNC: 4.7 MMOL/L — SIGNIFICANT CHANGE UP (ref 3.5–5.3)
RBC # BLD: 4.54 M/UL — SIGNIFICANT CHANGE UP (ref 3.8–5.2)
RBC # FLD: 14.2 % — SIGNIFICANT CHANGE UP (ref 10.3–14.5)
SODIUM SERPL-SCNC: 136 MMOL/L — SIGNIFICANT CHANGE UP (ref 135–145)
WBC # BLD: 10.4 K/UL — SIGNIFICANT CHANGE UP (ref 3.8–10.5)
WBC # FLD AUTO: 10.4 K/UL — SIGNIFICANT CHANGE UP (ref 3.8–10.5)

## 2017-09-06 RX ORDER — OXYCODONE AND ACETAMINOPHEN 5; 325 MG/1; MG/1
1 TABLET ORAL ONCE
Qty: 0 | Refills: 0 | Status: DISCONTINUED | OUTPATIENT
Start: 2017-09-06 | End: 2017-09-06

## 2017-09-06 RX ADMIN — Medication 200 MILLIGRAM(S): at 21:18

## 2017-09-06 RX ADMIN — Medication 1: at 09:02

## 2017-09-06 RX ADMIN — Medication 3 MILLILITER(S): at 01:35

## 2017-09-06 RX ADMIN — Medication 5 MILLIGRAM(S): at 05:26

## 2017-09-06 RX ADMIN — Medication 2: at 21:14

## 2017-09-06 RX ADMIN — INSULIN GLARGINE 18 UNIT(S): 100 INJECTION, SOLUTION SUBCUTANEOUS at 09:03

## 2017-09-06 RX ADMIN — Medication 5 MILLIGRAM(S): at 17:22

## 2017-09-06 RX ADMIN — FAMOTIDINE 20 MILLIGRAM(S): 10 INJECTION INTRAVENOUS at 11:56

## 2017-09-06 RX ADMIN — Medication 50 MILLIGRAM(S): at 21:10

## 2017-09-06 RX ADMIN — Medication 5: at 17:21

## 2017-09-06 RX ADMIN — GABAPENTIN 300 MILLIGRAM(S): 400 CAPSULE ORAL at 21:09

## 2017-09-06 RX ADMIN — ONDANSETRON 4 MILLIGRAM(S): 8 TABLET, FILM COATED ORAL at 23:13

## 2017-09-06 RX ADMIN — Medication 5 MILLIGRAM(S): at 23:13

## 2017-09-06 RX ADMIN — INSULIN GLARGINE 18 UNIT(S): 100 INJECTION, SOLUTION SUBCUTANEOUS at 21:15

## 2017-09-06 RX ADMIN — Medication 81 MILLIGRAM(S): at 11:56

## 2017-09-06 RX ADMIN — Medication 5: at 11:55

## 2017-09-06 RX ADMIN — Medication 3 MILLILITER(S): at 07:57

## 2017-09-06 RX ADMIN — OXYCODONE AND ACETAMINOPHEN 1 TABLET(S): 5; 325 TABLET ORAL at 18:24

## 2017-09-06 RX ADMIN — LISINOPRIL 2.5 MILLIGRAM(S): 2.5 TABLET ORAL at 05:28

## 2017-09-06 RX ADMIN — Medication 5 MILLIGRAM(S): at 11:57

## 2017-09-06 RX ADMIN — ENOXAPARIN SODIUM 40 MILLIGRAM(S): 100 INJECTION SUBCUTANEOUS at 05:25

## 2017-09-06 RX ADMIN — Medication 30 MILLIGRAM(S): at 05:25

## 2017-09-06 RX ADMIN — Medication 200 MILLIGRAM(S): at 10:14

## 2017-09-06 RX ADMIN — ATORVASTATIN CALCIUM 40 MILLIGRAM(S): 80 TABLET, FILM COATED ORAL at 21:09

## 2017-09-06 RX ADMIN — GABAPENTIN 300 MILLIGRAM(S): 400 CAPSULE ORAL at 14:06

## 2017-09-06 RX ADMIN — Medication 3 MILLILITER(S): at 20:25

## 2017-09-06 RX ADMIN — GABAPENTIN 300 MILLIGRAM(S): 400 CAPSULE ORAL at 05:27

## 2017-09-06 RX ADMIN — Medication 3 MILLIGRAM(S): at 21:09

## 2017-09-06 NOTE — PROGRESS NOTE ADULT - SUBJECTIVE AND OBJECTIVE BOX
Patient is a 57y old  Female who presents with a chief complaint of SOB (02 Sep 2017 23:47)      Subective:  Feels much better. Coughing much btter    PAST MEDICAL & SURGICAL HISTORY:  Lung cancer  COPD (chronic obstructive pulmonary disease)  No significant past surgical history      MEDICATIONS  (STANDING):  imipramine 50 milliGRAM(s) Oral at bedtime  atorvastatin 40 milliGRAM(s) Oral at bedtime  tiotropium 18 MICROgram(s) Capsule 1 Capsule(s) Inhalation daily  famotidine    Tablet 20 milliGRAM(s) Oral daily  oxybutynin 5 milliGRAM(s) Oral four times a day  aspirin enteric coated 81 milliGRAM(s) Oral daily  lisinopril 2.5 milliGRAM(s) Oral daily  ALBUTerol/ipratropium for Nebulization 3 milliLiter(s) Nebulizer every 6 hours  enoxaparin Injectable 40 milliGRAM(s) SubCutaneous every 24 hours  gabapentin 300 milliGRAM(s) Oral every 8 hours  insulin glargine Injectable (LANTUS) 18 Unit(s) SubCutaneous two times a day  insulin lispro (HumaLOG) corrective regimen sliding scale   SubCutaneous three times a day before meals  insulin lispro (HumaLOG) corrective regimen sliding scale   SubCutaneous at bedtime  dextrose 5%. 1000 milliLiter(s) (50 mL/Hr) IV Continuous <Continuous>  dextrose 50% Injectable 12.5 Gram(s) IV Push once  dextrose 50% Injectable 25 Gram(s) IV Push once  dextrose 50% Injectable 25 Gram(s) IV Push once  predniSONE   Tablet 30 milliGRAM(s) Oral daily    MEDICATIONS  (PRN):  ALBUTerol    90 MICROgram(s) HFA Inhaler 2 Puff(s) Inhalation every 6 hours PRN Shortness of Breath and/or Wheezing  dextrose Gel 1 Dose(s) Oral once PRN Blood Glucose LESS THAN 70 milliGRAM(s)/deciliter  glucagon  Injectable 1 milliGRAM(s) IntraMuscular once PRN Glucose LESS THAN 70 milligrams/deciliter  guaiFENesin   Syrup  (Sugar-Free) 200 milliGRAM(s) Oral every 6 hours PRN Cough  acetaminophen   Tablet. 650 milliGRAM(s) Oral once PRN Mild Pain (1 - 3)  melatonin 3 milliGRAM(s) Oral at bedtime PRN Insomnia  ondansetron Injectable 4 milliGRAM(s) IV Push every 6 hours PRN Nausea and/or Vomiting  metoclopramide 10 milliGRAM(s) Oral four times a day PRN nausea and/or vomiting      REVIEW OF SYSTEMS:    RESPIRATORY: No shortness of breath  CARDIOVASCULAR: No chest pain  All other review of systems is negative unless indicated above.    Vital Signs Last 24 Hrs  T(C): 36.9 (06 Sep 2017 16:34), Max: 36.9 (06 Sep 2017 11:56)  T(F): 98.5 (06 Sep 2017 16:34), Max: 98.5 (06 Sep 2017 11:56)  HR: 97 (06 Sep 2017 16:34) (40 - 100)  BP: 117/55 (06 Sep 2017 16:34) (117/55 - 150/69)  BP(mean): 80 (06 Sep 2017 11:56) (80 - 81)  RR: 199 (06 Sep 2017 11:56) (20 - 199)  SpO2: 97% (06 Sep 2017 16:34) (91% - 97%)    PHYSICAL EXAM:    Constitutional: NAD, well-developed  Respiratory: CTAB  Cardiovascular: S1 and S2, RRR  Gastrointestinal: BS+, soft, NT/ND  Extremities: No peripheral edema  Psychiatric: Normal mood, normal affect    LABS:                        13.1   10.4  )-----------( 234      ( 06 Sep 2017 06:37 )             38.8     09-06    136  |  104  |  34<H>  ----------------------------<  157<H>  4.7   |  25  |  1.08    Ca    9.7      06 Sep 2017 06:37            RADIOLOGY & ADDITIONAL STUDIES:

## 2017-09-06 NOTE — PROGRESS NOTE ADULT - PROBLEM SELECTOR PLAN 7
spoke to Dr Woodward, he will come see her today and check if pt is ready for EGD tomorrow.  keep pt NPO after midnight

## 2017-09-06 NOTE — PROGRESS NOTE ADULT - SUBJECTIVE AND OBJECTIVE BOX
Pt is a 57 y F with PMHx DM, HTN, CAD ( s/p CABG), COPD who is an active smoker brought in [facility] from [facility]) with complaints of shortness of breath, pleuritic chest pain and dry non productive cough. According to the patient she was recently diagnosed with lung cancer at [facility], however she is uncooperative and doesn't recall when she was diagnosed. She reports her SOB started suddenly at the [facility]  and has had similar episodes a few times in the past. However, at the shelter her SOB got progressively worse and she doesn't remember any alleviating or exacerbating factors. In the ED she was CTA was negative for Pulmonary Embolism, Her CXR did not not show any pathology. She was given nebulizer treatments.     57 y F with PMHx DM, HTN, CAD ( s/p CABG), COPD on home O2 , active smoker brought in [facility] from [facility] ) with complaints of shortness of breath, pleuritic chest pain and cough. According to the patient she was recently diagnosed with lung cancer.     Patient is uncooperative and does not recall what she was doing when the shortness of breath started. Says it started suddenly and has had similar episodes a few times in the past. SOB got progressively worse. She does not recall any alleviating or exacerbating factors. ( Is lying flat in on the stretcher in ED and does not appear to be in any distress.) Says that she has been having cough for the past couple of days, does not recall if is productive. Is not sure if had fever/ chills. She has not taken her medications for the last 2 days as she had not been feeling well.   In the ED she was CTA was negative for PE.  CXR did not not show any pathology. She was given 3x nebulizer treatments en route to the hospital- had symptomatic relief.   She was recently admitted to [facility] for shortness of breath and AMS and treated for COPD exacerbation.  According to the patient she was diagnosed as having lung cancer at [facility] but did not follow up with recommended therapy.   Denies any palpitations, change in bowel/ urinary habits.  9/3/17: Pt was seen at bedside and still is c/o non productive dry cough and shortness of breath. She is in NAD and is resting comfortably. Pt denies any fever, headache, body aches, chills, chest congestion, chest pain, palpitations, nausea, vomiting, abdominal pain, diarrhea and constipation. Pt has no further complaints.     9/4/17: Pt was seen at beside and still is feeling tired, short of breath and has a minor dry non productive cough. She is in NAD and is resting comfortably. Pt denies any fever, headache, body aches, chills, chest congestion, chest pain, palpitations, nausea, vomiting, abdominal pain, diarrhea and constipation. Pt has no further complaints. VSS. IV fluids were discontinued.     9/5/17: Pt was seen at bedside and is looking better compared to yesterday. she is resting in bed comfortably and is in NAD. Pt denies any fever, headaches, body aches, chills, chest congestion, chest pain, palpitations, nausea, vomiting, abdominal pain, diarrhea and constipation. Pt has no further complaints. VSS.     9/6/17: Pt was seen at bedside and is in NAD. Pt is resting comfortably, has a good appetitive and her shortness of breath and cough has improved drastically since admission. Pt and family medicine team are in works with Dr. Woodward (GI) for possible EGD tomorrow. I spoke to Dr Woodward today and he mentions he will go see her today and see if she is ready for an EGD tomorrow. Plan was discussed with pt and pt agrees and understands. Pt has no further complaints.    REVIEW OF SYSTEMS:  General: NAD, hemodynamically stable, (-)  fever, (-) chills, - weakness  HEENT:  Eyes:  No visual loss, blurred vision, double vision  sneezing, congestion, runny nose or sore throat.  SKIN:  No rash or itching.  CARDIOVASCULAR:  No chest pain, chest pressure or chest discomfort. No palpitations or edema.  RESPIRATORY:  + shortness of breath, intermittent dry non productive cough  GASTROINTESTINAL:  No anorexia, nausea, vomiting or diarrhea. No abdominal pain or blood.  NEUROLOGICAL:  No headache, dizziness, numbness or tingling in the extremities.   MUSCULOSKELETAL:  No muscle, back pain, joint pain or stiffness.  ENDOCRINOLOGIC:  No reports of sweating, cold or heat intolerance.   ALLERGIES:   hives, eczema or rhinitis.    ICU Vital Signs Last 24 Hrs  T(C): 37.1 (05 Sep 2017 11:15), Max: 37.1 (05 Sep 2017 11:15)  T(F): 98.7 (05 Sep 2017 11:15), Max: 98.7 (05 Sep 2017 11:15)  HR: 96 (05 Sep 2017 11:15) (79 - 96)  BP: 131/95 (05 Sep 2017 11:15) (110/47 - 139/62)  RR: 18 (05 Sep 2017 11:15) (17 - 19)  SpO2: 97% (05 Sep 2017 11:15) (96% - 100%)    PHYSICAL EXAM:  GENERAL: NAD,   HEAD:  NC/AT  EYES: EOMI, PERRLA, no scleral icterus  HEENT: Moist mucous membranes  NECK: Supple, No JVD  CNS:  Alert & Oriented X3,  LUNG: prolonged expiration and +mild wheezing (much better from yesterday), no ronchi, no rales,  HEART: RRR; No murmurs, rubs, or gallops  ABDOMEN: +BS, ST/ND/NT  EXTREMITIES:  2+ Peripheral Pulses, No clubbing, cyanosis, or b/l leg swelling noted.     Labs    CARDIAC MARKERS ( 03 Sep 2017 04:00 )  <0.015 ng/mL / x     / x     / x     / x      CARDIAC MARKERS ( 02 Sep 2017 22:27 )  <0.015 ng/mL / x     / x     / x     / x      CARDIAC MARKERS ( 02 Sep 2017 18:58 )  <0.015 ng/mL / x     / x     / x     / x        Labs                     13.1   10.4  )-----------( 234      ( 06 Sep 2017 06:37 )             38.8     06 Sep 2017 06:37    136    |  104    |  34     ----------------------------<  157    4.7     |  25     |  1.08     Ca    9.7        06 Sep 2017 06:37      CAPILLARY BLOOD GLUCOSE  368 (06 Sep 2017 11:54)  158 (06 Sep 2017 08:37)  327 (05 Sep 2017 22:39)  412 (05 Sep 2017 21:05)      Tests:     Rapid Respiratory Viral Panel (09.02.17 @ 21:31)    Rapid RVP Result: NotDete: The [employer] RVP Rapid uses polymerase chain reaction (PCR) and melt  curve analysis to screen for adenovirus; coronavirus HKU1, NL63, 229E,  OC43; human metapneumovirus (hMPV); human enterovirus/rhinovirus  (Entero/RV); influenza A; influenza A/H1;NotDetec: influenza A/H3; influenza  A/H1-2009; influenza B; parainfluenza viruses 1, 2, 3, 4; respiratory  syncytial virus; Bordetella pertussis; Mycoplasma pneumoniae; and  Chlamydophila pneumoniae.      < from: CT Angio Chest PE Protocol w/ IV Cont (09.02.17 @ 22:19) >  IMPRESSION:  No pulmonary embolus. Mild diffuse centrilobular emphysema.    < end of copied text >    < from: 12 Lead ECG (09.02.17 @ 19:00) >  Diagnosis Line Sinus tachycardia with frequent Premature ventricular complexes  Nonspecific T wave abnormality  Abnormal ECG  When compared with ECG of 04-AUG-2017 22:39,  Premature ventricular complexes are now Present  Questionable change in QRS axis  Nonspecific T wave abnormality, worse in Lateral leads  Confirmed by MOODY RUIZ, KRYSTAL ([id number] on 9/3/2017 2:21:06 PM    < end of copied text >

## 2017-09-06 NOTE — PROGRESS NOTE ADULT - PROBLEM SELECTOR PLAN 1
continue pt on po prednisone 30mg QD   continue bronchidilaters as directed  continue supplemental 02

## 2017-09-06 NOTE — PROGRESS NOTE ADULT - ASSESSMENT
Pt is a 57 y F with PMHx of  COPD who is an active smoker brought in [facility] from [facility]) with complaints of shortness of breath, pleuritic chest pain and dry non productive cough.

## 2017-09-07 ENCOUNTER — RESULT REVIEW (OUTPATIENT)
Age: 57
End: 2017-09-07

## 2017-09-07 VITALS
HEART RATE: 91 BPM | TEMPERATURE: 98 F | OXYGEN SATURATION: 97 % | DIASTOLIC BLOOD PRESSURE: 62 MMHG | RESPIRATION RATE: 16 BRPM | SYSTOLIC BLOOD PRESSURE: 108 MMHG

## 2017-09-07 PROCEDURE — 88305 TISSUE EXAM BY PATHOLOGIST: CPT | Mod: 26

## 2017-09-07 PROCEDURE — 88313 SPECIAL STAINS GROUP 2: CPT | Mod: 26

## 2017-09-07 PROCEDURE — 88312 SPECIAL STAINS GROUP 1: CPT | Mod: 26

## 2017-09-07 RX ORDER — METOCLOPRAMIDE HCL 10 MG
1 TABLET ORAL
Qty: 80 | Refills: 0 | OUTPATIENT
Start: 2017-09-07 | End: 2017-09-27

## 2017-09-07 RX ORDER — LISINOPRIL 2.5 MG/1
1 TABLET ORAL
Qty: 30 | Refills: 0 | OUTPATIENT
Start: 2017-09-07 | End: 2017-10-07

## 2017-09-07 RX ORDER — TIOTROPIUM BROMIDE 18 UG/1
1 CAPSULE ORAL; RESPIRATORY (INHALATION)
Qty: 1 | Refills: 0 | OUTPATIENT
Start: 2017-09-07 | End: 2017-10-07

## 2017-09-07 RX ADMIN — Medication 6: at 17:25

## 2017-09-07 RX ADMIN — LISINOPRIL 2.5 MILLIGRAM(S): 2.5 TABLET ORAL at 05:38

## 2017-09-07 RX ADMIN — GABAPENTIN 300 MILLIGRAM(S): 400 CAPSULE ORAL at 05:38

## 2017-09-07 RX ADMIN — Medication 5 MILLIGRAM(S): at 17:27

## 2017-09-07 RX ADMIN — Medication 200 MILLIGRAM(S): at 17:23

## 2017-09-07 RX ADMIN — Medication 30 MILLIGRAM(S): at 05:38

## 2017-09-07 RX ADMIN — Medication 5 MILLIGRAM(S): at 17:24

## 2017-09-07 RX ADMIN — Medication 5 MILLIGRAM(S): at 05:38

## 2017-09-07 RX ADMIN — Medication 3 MILLILITER(S): at 08:05

## 2017-09-07 RX ADMIN — Medication 3 MILLILITER(S): at 14:03

## 2017-09-07 RX ADMIN — Medication 3 MILLILITER(S): at 01:56

## 2017-09-07 RX ADMIN — ONDANSETRON 4 MILLIGRAM(S): 8 TABLET, FILM COATED ORAL at 17:36

## 2017-09-07 RX ADMIN — Medication 10 MILLIGRAM(S): at 02:45

## 2017-09-07 RX ADMIN — GABAPENTIN 300 MILLIGRAM(S): 400 CAPSULE ORAL at 17:25

## 2017-09-07 RX ADMIN — Medication 1: at 08:59

## 2017-09-07 NOTE — PROGRESS NOTE ADULT - PROBLEM SELECTOR PLAN 3
continue to monitor BS  continue insulin lispro

## 2017-09-07 NOTE — PROGRESS NOTE ADULT - ASSESSMENT
Pt is a 57 y F with PMHx of  COPD who is an active smoker brought in [facility] from [facility]) with complaints of shortness of breath, pleuritic chest pain and dry non productive cough.

## 2017-09-07 NOTE — DISCHARGE NOTE ADULT - PLAN OF CARE
control breathing and prevent further attacks patient was educated and discussion was done on smoking cessation.  take nebulzier and inhaler as directed.  continue to take steroids (with tapering dose)  f/u with pulmonologist control blood sugar monitor BS daily  take diabetic medication as directed  encouraged low carbohydrate diet monitor blood pressure take blood pressure  low sodium intake  take medication as directed maintain renal function low sodium diet  [facility]  take medication as directed follow up with  who gave diagnosis. medical records from [facility] were obtained and no evidence of diagnosis was found.   please follow up with doctor who diagnosed you with lung cancer preventative medicine

## 2017-09-07 NOTE — DISCHARGE NOTE ADULT - MEDICATION SUMMARY - MEDICATIONS TO TAKE
I will START or STAY ON the medications listed below when I get home from the hospital:    predniSONE 10 mg oral tablet  -- 3 tab(s) by mouth once a day x 2 days  2 tab(s) by mouth once a day x 2 days  1 tab(s) by mouth once a day x 2 days  -- Indication: For COPD exacerbation    acetaminophen 325 mg oral tablet  -- 2 tab(s) by mouth every 6 hours, As needed, Mild Pain (1 - 3)  -- Indication: For Pain    aspirin 81 mg oral delayed release tablet  -- 1 tab(s) by mouth once a day  -- Indication: For Prophylactic measure    lisinopril 2.5 mg oral tablet  -- 1 tab(s) by mouth once a day  -- Indication: For Hypertension    gabapentin 600 mg oral tablet  -- 1 tab(s) by mouth 4 times a day  -- Indication: For Pain    imipramine 50 mg oral tablet  -- 1 tab(s) by mouth once a day (at bedtime)  -- Indication: For mood stabilzer    insulin glargine  -- 25 unit(s) subcutaneous 2 times a day  -- Indication: For Diabetes mellitus    insulin lispro 100 units/mL subcutaneous solution  -- 1 Unit(s) if Glucose 151 - 200  2 Unit(s) if Glucose 201 - 250  3 Unit(s) if Glucose 251 - 300  4 Unit(s) if Glucose 301 - 350  5 Unit(s) if Glucose 351 - 400  6 Unit(s) if Glucose GREATER THAN 400  -- Indication: For Diabetes mellitus    diphenhydrAMINE 50 mg oral capsule  -- 1 cap(s) by mouth once a day (at bedtime)  -- Indication: For allergy    metoclopramide 10 mg oral tablet  -- 1 tab(s) by mouth 4 times a day, As needed, nausea and/or vomiting  -- Indication: For gastoparesis    atorvastatin 40 mg oral tablet  -- 1 tab(s) by mouth once a day (at bedtime)  -- Indication: For Hyperlipidemia    albuterol 90 mcg/inh inhalation aerosol  -- 2 puff(s) inhaled every 6 hours, As needed, Shortness of Breath and/or Wheezing  -- Indication: For COPD exacerbation    albuterol-ipratropium 2.5 mg-0.5 mg/3 mL inhalation solution  -- 3 milliliter(s) inhaled every 6 hours, As Needed wheezing/shortness of breath  -- Indication: For COPD exacerbation    tiotropium 18 mcg inhalation capsule  -- 1 cap(s) inhaled once a day  -- Indication: For COPD exacerbation    guaiFENesin 100 mg/5 mL oral liquid  -- 5 milliliter(s) by mouth every 6 hours, As needed, Cough  -- Indication: For COugh    famotidine 20 mg oral tablet  -- 1 tab(s) by mouth once a day  -- Indication: For gastritis    pantoprazole 40 mg oral delayed release tablet  -- 1 tab(s) by mouth once a day (before a meal)  -- Indication: For Gastirtis    oxybutynin 5 mg oral tablet  -- 1 tab(s) by mouth 4 times a day  -- Indication: For urine incontience

## 2017-09-07 NOTE — PROGRESS NOTE ADULT - PROBLEM SELECTOR PLAN 6
DVT prophylactic care with enoxaparin 40mg

## 2017-09-07 NOTE — PROGRESS NOTE ADULT - SUBJECTIVE AND OBJECTIVE BOX
Pt is a 57 y F with PMHx DM, HTN, CAD ( s/p CABG), COPD who is an active smoker brought in [facility] from [facility]) with complaints of shortness of breath, pleuritic chest pain and dry non productive cough. According to the patient she was recently diagnosed with lung cancer at [facility] [place], however she is uncooperative and doesn't recall when she was diagnosed. She reports her SOB started suddenly at the [facility]  and has had similar episodes a few times in the past. However, at the shelter her SOB got progressively worse and she doesn't remember any alleviating or exacerbating factors. In the ED she was CTA was negative for Pulmonary Embolism, Her CXR did not not show any pathology. She was given nebulizer treatments.     57 y F with PMHx DM, HTN, CAD ( s/p CABG), COPD on home O2 , active smoker brought in [facility] from [facility] ) with complaints of shortness of breath, pleuritic chest pain and cough. According to the patient she was recently diagnosed with lung cancer.     Patient is uncooperative and does not recall what she was doing when the shortness of breath started. Says it started suddenly and has had similar episodes a few times in the past. SOB got progressively worse. She does not recall any alleviating or exacerbating factors. ( Is lying flat in on the stretcher in ED and does not appear to be in any distress.) Says that she has been having cough for the past couple of days, does not recall if is productive. Is not sure if had fever/ chills. She has not taken her medications for the last 2 days as she had not been feeling well.   In the ED she was CTA was negative for PE.  CXR did not not show any pathology. She was given 3x nebulizer treatments en route to the hospital- had symptomatic relief.   She was recently admitted to [facility] for shortness of breath and AMS and treated for COPD exacerbation.  According to the patient she [place] as [place] [facility] did not follow up with recommended therapy.   Denies any palpitations, change in bowel/ urinary habits.  9/3/17: Pt was seen at bedside and still is c/o non productive dry cough and shortness of breath. She is in NAD and is resting comfortably. Pt denies any fever, headache, body aches, chills, chest congestion, chest pain, palpitations, nausea, vomiting, abdominal pain, diarrhea and constipation. Pt has no further complaints.     9/4/17: Pt was seen at beside and still is feeling tired, short of breath and has a minor dry non productive cough. She is in NAD and is resting comfortably. Pt denies any fever, headache, body aches, chills, chest congestion, chest pain, palpitations, nausea, vomiting, abdominal pain, diarrhea and constipation. Pt has no further complaints. VSS. IV fluids were discontinued.     9/5/17: Pt was seen at bedside and is looking better compared to yesterday. she is resting in bed comfortably and is in NAD. Pt denies any fever, headaches, body aches, chills, chest congestion, chest pain, palpitations, nausea, vomiting, abdominal pain, diarrhea and constipation. Pt has no further complaints. VSS.     9/6/17: Pt was seen at bedside and is in NAD. Pt is resting comfortably, has a good appetitive and her shortness of breath and cough has improved drastically since admission. Pt and family medicine team are in works with Dr. Woodward (GI) for possible EGD tomorrow. I spoke to Dr Woodward today and he mentions he will go see her today and see if she is ready for an EGD tomorrow. Plan was discussed with pt and pt agrees and understands. Pt has no further complaints.    9/7/17: pt was seen at bedside and is in NAD. Pt is resting comfortably and is NPO due to her EGD taking place today with Dr. Woodward. Possible discharge later today pending EGD. Pt VSS and has no further complaints.     REVIEW OF SYSTEMS:  General: NAD, hemodynamically stable, (-)  fever, (-) chills, - weakness  HEENT:  Eyes:  No visual loss, blurred vision, double vision  sneezing, congestion, runny nose or sore throat.  SKIN:  No rash or itching.  CARDIOVASCULAR:  No chest pain, chest pressure or chest discomfort. No palpitations or edema.  RESPIRATORY:  no shortness of breath, minor dry cough.  GASTROINTESTINAL:  No anorexia, nausea, vomiting or diarrhea. No abdominal pain or blood.  NEUROLOGICAL:  No headache, dizziness, numbness or tingling in the extremities.   MUSCULOSKELETAL:  No muscle, back pain, joint pain or stiffness.  ENDOCRINOLOGIC:  No reports of sweating, cold or heat intolerance.   ALLERGIES:   hives, eczema or rhinitis.    ICU Vital Signs Last 24 Hrs  T(C): 36.7 (06 Sep 2017 21:46), Max: 36.9 (06 Sep 2017 11:56)  T(F): 98.1 (06 Sep 2017 21:46), Max: 98.5 (06 Sep 2017 11:56)  HR: 72 (07 Sep 2017 08:06) (52 - 97)  BP: 139/53 (06 Sep 2017 21:46) (117/55 - 139/53)  BP(mean): 80 (06 Sep 2017 11:56) (80 - 80)  RR: 18 (06 Sep 2017 21:46) (18 - 199)  SpO2: 96% (06 Sep 2017 21:46) (96% - 97%)    PHYSICAL EXAM:  GENERAL: NAD,   HEAD:  NC/AT  EYES: EOMI, PERRLA, no scleral icterus  HEENT: Moist mucous membranes  NECK: Supple, No JVD  CNS:  Alert & Oriented X3,  LUNG: prolonged expiration and no wheezing, no ronchi, no rales,  HEART: RRR; No murmurs, rubs, or gallops  ABDOMEN: +BS, ST/ND/NT  EXTREMITIES:  2+ Peripheral Pulses, No clubbing, cyanosis, or b/l leg swelling noted.     Labs    CARDIAC MARKERS ( 03 Sep 2017 04:00 )  <0.015 ng/mL / x     / x     / x     / x      CARDIAC MARKERS ( 02 Sep 2017 22:27 )  <0.015 ng/mL / x     / x     / x     / x      CARDIAC MARKERS ( 02 Sep 2017 18:58 )  <0.015 ng/mL / x     / x     / x     / x        Labs                        13.1   10.4  )-----------( 234      ( 06 Sep 2017 06:37 )             38.8     06 Sep 2017 06:37    136    |  104    |  34     ----------------------------<  157    4.7     |  25     |  1.08     Ca    9.7        06 Sep 2017 06:37        CAPILLARY BLOOD GLUCOSE  206 (07 Sep 2017 07:43)  319 (06 Sep 2017 21:18)  376 (06 Sep 2017 16:52)  368 (06 Sep 2017 11:54)        Tests:     Rapid Respiratory Viral Panel (09.02.17 @ 21:31)    Rapid RVP Result: NotDete: The FilmArray RVP Rapid uses polymerase chain reaction (PCR) and melt  curve analysis to screen for adenovirus; coronavirus HKU1, NL63, 229E,  OC43; human metapneumovirus (hMPV); human enterovirus/rhinovirus  (Entero/RV); influenza A; influenza A/H1;NotDetec: influenza A/H3; influenza  A/H1-2009; influenza B; parainfluenza viruses 1, 2, 3, 4; respiratory  syncytial virus; Bordetella pertussis; Mycoplasma pneumoniae; and  Chlamydophila pneumoniae.      < from: CT Angio Chest PE Protocol w/ IV Cont (09.02.17 @ 22:19) >  IMPRESSION:  No pulmonary embolus. Mild diffuse centrilobular emphysema.    < from: 12 Lead ECG (09.02.17 @ 19:00) >  Diagnosis Line Sinus tachycardia with frequent Premature ventricular complexes  Nonspecific T wave abnormality  Abnormal ECG  When compared with ECG of 04-AUG-2017 22:39,  Premature ventricular complexes are now Present  Questionable change in QRS axis  Nonspecific T wave abnormality, worse in Lateral leads  Confirmed by MOODY RUIZ, KRYSTAL ([id number]) on 9/3/2017 2:21:06 PM Pt is a 57 y F with PMHx DM, HTN, CAD ( s/p CABG), COPD who is an active smoker brought in [facility] from [facility]) with complaints of shortness of breath, pleuritic chest pain and dry non productive cough. According to the patient she was recently diagnosed with lung cancer at [facility], however she is uncooperative and doesn't recall when she was diagnosed. She reports her SOB started suddenly at the [facility]  and has had similar episodes a few times in the past. However, at the shelter her SOB got progressively worse and she doesn't remember any alleviating or exacerbating factors. In the ED she was CTA was negative for Pulmonary Embolism, Her CXR did not not show any pathology. She was given nebulizer treatments.     57 y F with PMHx DM, HTN, CAD ( s/p CABG), COPD on home O2 , active smoker brought in [facility] from [facility] ) with complaints of shortness of breath, pleuritic chest pain and cough. According to the patient she was recently diagnosed with lung cancer.     Patient is uncooperative and does not recall what she was doing when the shortness of breath started. Says it started suddenly and has had similar episodes a few times in the past. SOB got progressively worse. She does not recall any alleviating or exacerbating factors. ( Is lying flat in on the stretcher in ED and does not appear to be in any distress.) Says that she has been having cough for the past couple of days, does not recall if is productive. Is not sure if had fever/ chills. She has not taken her medications for the last 2 days as she had not been feeling well.   In the ED she was CTA was negative for PE.  CXR did not not show any pathology. She was given 3x nebulizer treatments en route to the hospital- had symptomatic relief.   She was recently admitted to [facility] for shortness of breath and AMS and treated for COPD exacerbation.  According to the patient she was diagnosed as having [place] [facility] [place] but did not follow up with recommended therapy.   Denies any palpitations, change in bowel/ urinary habits.  9/3/17: Pt was seen at bedside and still is c/o non productive dry cough and shortness of breath. She is in NAD and is resting comfortably. Pt denies any fever, headache, body aches, chills, chest congestion, chest pain, palpitations, nausea, vomiting, abdominal pain, diarrhea and constipation. Pt has no further complaints.     9/4/17: Pt was seen at beside and still is feeling tired, short of breath and has a minor dry non productive cough. She is in NAD and is resting comfortably. Pt denies any fever, headache, body aches, chills, chest congestion, chest pain, palpitations, nausea, vomiting, abdominal pain, diarrhea and constipation. Pt has no further complaints. VSS. IV fluids were discontinued.     9/5/17: Pt was seen at bedside and is looking better compared to yesterday. she is resting in bed comfortably and is in NAD. Pt denies any fever, headaches, body aches, chills, chest congestion, chest pain, palpitations, nausea, vomiting, abdominal pain, diarrhea and constipation. Pt has no further complaints. VSS.     9/6/17: Pt was seen at bedside and is in NAD. Pt is resting comfortably, has a good appetitive and her shortness of breath and cough has improved drastically since admission. Pt and family medicine team are in works with Dr. Woodward (GI) for possible EGD tomorrow. I spoke to Dr Woodward today and he mentions he will go see her today and see if she is ready for an EGD tomorrow. Plan was discussed with pt and pt agrees and understands. Pt has no further complaints.    9/7/17: pt was seen at bedside and is in NAD. Pt is resting comfortably and is NPO due to her EGD taking place today with Dr. Woodward. Possible discharge later today pending EGD. Pt VSS and has no further complaints. Spoke to Dr Woodward at 3:08pm and he mentioned after the procedure she is clear to go and can follow up with him in his office. Pt agrees and understand management of care.    REVIEW OF SYSTEMS:  General: NAD, hemodynamically stable, (-)  fever, (-) chills, - weakness  HEENT:  Eyes:  No visual loss, blurred vision, double vision  sneezing, congestion, runny nose or sore throat.  SKIN:  No rash or itching.  CARDIOVASCULAR:  No chest pain, chest pressure or chest discomfort. No palpitations or edema.  RESPIRATORY:  no shortness of breath, minor dry cough.  GASTROINTESTINAL:  No anorexia, nausea, vomiting or diarrhea. No abdominal pain or blood.  NEUROLOGICAL:  No headache, dizziness, numbness or tingling in the extremities.   MUSCULOSKELETAL:  No muscle, back pain, joint pain or stiffness.  ENDOCRINOLOGIC:  No reports of sweating, cold or heat intolerance.   ALLERGIES:   hives, eczema or rhinitis.    ICU Vital Signs Last 24 Hrs  T(C): 36.7 (06 Sep 2017 21:46), Max: 36.9 (06 Sep 2017 11:56)  T(F): 98.1 (06 Sep 2017 21:46), Max: 98.5 (06 Sep 2017 11:56)  HR: 72 (07 Sep 2017 08:06) (52 - 97)  BP: 139/53 (06 Sep 2017 21:46) (117/55 - 139/53)  BP(mean): 80 (06 Sep 2017 11:56) (80 - 80)  RR: 18 (06 Sep 2017 21:46) (18 - 199)  SpO2: 96% (06 Sep 2017 21:46) (96% - 97%)    PHYSICAL EXAM:  GENERAL: NAD,   HEAD:  NC/AT  EYES: EOMI, PERRLA, no scleral icterus  HEENT: Moist mucous membranes  NECK: Supple, No JVD  CNS:  Alert & Oriented X3,  LUNG: prolonged expiration and no wheezing, no ronchi, no rales,  HEART: RRR; No murmurs, rubs, or gallops  ABDOMEN: +BS, ST/ND/NT  EXTREMITIES:  2+ Peripheral Pulses, No clubbing, cyanosis, or b/l leg swelling noted.     Labs    CARDIAC MARKERS ( 03 Sep 2017 04:00 )  <0.015 ng/mL / x     / x     / x     / x      CARDIAC MARKERS ( 02 Sep 2017 22:27 )  <0.015 ng/mL / x     / x     / x     / x      CARDIAC MARKERS ( 02 Sep 2017 18:58 )  <0.015 ng/mL / x     / x     / x     / x        Labs                        13.1   10.4  )-----------( 234      ( 06 Sep 2017 06:37 )             38.8     06 Sep 2017 06:37    136    |  104    |  34     ----------------------------<  157    4.7     |  25     |  1.08     Ca    9.7        06 Sep 2017 06:37        CAPILLARY BLOOD GLUCOSE  206 (07 Sep 2017 07:43)  319 (06 Sep 2017 21:18)  376 (06 Sep 2017 16:52)  368 (06 Sep 2017 11:54)        Tests:     Rapid Respiratory Viral Panel (09.02.17 @ 21:31)    Rapid RVP Result: NotDete: The FilmArray RVP Rapid uses polymerase chain reaction (PCR) and melt  curve analysis to screen for adenovirus; coronavirus HKU1, NL63, 229E,  OC43; human metapneumovirus (hMPV); human enterovirus/rhinovirus  (Entero/RV); influenza A; influenza A/H1;NotDetec: influenza A/H3; influenza  A/H1-2009; influenza B; parainfluenza viruses 1, 2, 3, 4; respiratory  syncytial virus; Bordetella pertussis; Mycoplasma pneumoniae; and  Chlamydophila pneumoniae.      < from: CT Angio Chest PE Protocol w/ IV Cont (09.02.17 @ 22:19) >  IMPRESSION:  No pulmonary embolus. Mild diffuse centrilobular emphysema.    < from: 12 Lead ECG (09.02.17 @ 19:00) >  Diagnosis Line Sinus tachycardia with frequent Premature ventricular complexes  Nonspecific T wave abnormality  Abnormal ECG  When compared with ECG of 04-AUG-2017 22:39,  Pre< from: Upper Endoscopy (09.07.17 @ 10:44) >  mature ventricular complexes are now Present  Questionable change in QRS axis  Nonspecific T wave abnormality, worse in Lateral leads  Confirmed by KRYSTAL URIOSTEGUI MD ([id number]) on 9/3/2017 2:21:06 PM        FINDING Findings:    FINDING      A small hiatus hernia was found. The proximal extent of the gastric     FINDING      folds (end of tubular esophagus) was 38 cm from the incisors. The hiatal     FINDING      narrowing was 41 cm from the incisors.    FINDING      The esophagus was otherwise normal.    FINDING      Two biopsies were obtained with cold forceps for evaluation of     FINDING      eosinophilic esophagitis in the lower and middle third of the esophagus.    FINDING      There was a moderate amount ofretained food in the stomach c/w     FINDING      gastroparesis. The stomach was otherwise normal. Multiple biopsies were     FINDING      obtained in the gastric antrum with cold forceps for Helicobacter pylori     FINDING      testing.    FINDING   There was a 1 cm polyp in the second portion of the duodenum and a few     FINDING      polyps from 6-8 mm in the distal bulb. Biopsies were obtained. The     FINDING      examined duodenum was otherwise normal.    COMPLIC Complications:       No immediate complications.    IMPRESS Impression:          - Small hiatus hernia.    IMPRESS                      - Retained food in the stomach c/w gastroparesis.    IMPRESS                      - Duodenal polyps.    ENDORECOMMENDATION Recommendation:    - Await pathology results.    ENDORECOMMENDATION                      - Continue present medications.    ENDORECOMMENDATION                      - Patient has a contact number available for     ENDORECOMMENDATION                      emergencies.The signs and symptoms of potential delayed     ENDORECOMMENDATION                      complications were discussed with the patient. Return to     ENDORECOMMENDATION                      normal activities tomorrow. Written discharge     ENDORECOMMENDATION                      instructions were provided to the patient.    ENDORECOMMENDATION                      - Resume previous diet.    SIGNATURENAME Kaden Woodward MD    SIGNATURENAMROBERT Woodward MD    SIGNATUREDATE 9/7/2017 1:06:12 PM    NUMADDENDA Number of Addenda: 0 Pt is a 57 y F with PMHx DM, HTN, CAD ( s/p CABG), COPD who is an active smoker brought in [facility] from [facility]) with complaints of shortness of breath, pleuritic chest pain and dry non productive cough. According to the patient she was recently diagnosed with lung cancer at [facility], however she is uncooperative and doesn't recall when she was diagnosed. She reports her SOB started suddenly at the [facility]  and has had similar episodes a few times in the past. However, at the shelter her SOB got progressively worse and she doesn't remember any alleviating or exacerbating factors. In the ED she was CTA was negative for Pulmonary Embolism, Her CXR did not not show any pathology. She was given nebulizer treatments.     57 y F with PMHx DM, HTN, CAD ( s/p CABG), COPD on home O2 , active smoker brought in [facility] from [facility] ) with complaints of shortness of breath, pleuritic chest pain and cough. According to the patient she was recently diagnosed with lung cancer.     Patient is uncooperative and does not recall what she was doing when the shortness of breath started. Says it started suddenly and has had similar episodes a few times in the past. SOB got progressively worse. She does not recall any alleviating or exacerbating factors. ( Is lying flat in on the stretcher in ED and does not appear to be in any distress.) Says that she has been having cough for the past couple of days, does not recall if is productive. Is not sure if had fever/ chills. She has not taken her medications for the last 2 days as she had not been feeling well.   In the ED she was CTA was negative for PE.  CXR did not not show any pathology. She was given 3x nebulizer treatments en route to the hospital- had symptomatic relief.   She was recently admitted to [facility] for shortness of breath and AMS and treated for COPD exacerbation.  According to the patient she was [place] as [place] [facility] [place] not follow up with recommended therapy.   Denies any palpitations, change in bowel/ urinary habits.  9/3/17: Pt was seen at bedside and still is c/o non productive dry cough and shortness of breath. She is in NAD and is resting comfortably. Pt denies any fever, headache, body aches, chills, chest congestion, chest pain, palpitations, nausea, vomiting, abdominal pain, diarrhea and constipation. Pt has no further complaints.     9/4/17: Pt was seen at beside and still is feeling tired, short of breath and has a minor dry non productive cough. She is in NAD and is resting comfortably. Pt denies any fever, headache, body aches, chills, chest congestion, chest pain, palpitations, nausea, vomiting, abdominal pain, diarrhea and constipation. Pt has no further complaints. VSS. IV fluids were discontinued.     9/5/17: Pt was seen at bedside and is looking better compared to yesterday. she is resting in bed comfortably and is in NAD. Pt denies any fever, headaches, body aches, chills, chest congestion, chest pain, palpitations, nausea, vomiting, abdominal pain, diarrhea and constipation. Pt has no further complaints. VSS.     9/6/17: Pt was seen at bedside and is in NAD. Pt is resting comfortably, has a good appetitive and her shortness of breath and cough has improved drastically since admission. Pt and family medicine team are in works with Dr. Woowdard (GI) for possible EGD tomorrow. I spoke to Dr Woodward today and he mentions he will go see her today and see if she is ready for an EGD tomorrow. Plan was discussed with pt and pt agrees and understands. Pt has no further complaints.    9/7/17: pt was seen at bedside and is in NAD. Pt is resting comfortably and is NPO due to her EGD taking place today with Dr. Woodward. Possible discharge later today pending EGD. Pt VSS and has no further complaints. Spoke to Dr Woodward at 3:08pm and he mentioned after the procedure she is clear to go and can follow up with him in his office.  In addition, pt was followed up with diagnosis of lung cancer and was advised to follow up with her PCP or Doctor who diagnosed her with lung cancer. [place] [facility] [place]. Pt agrees and understand management of care.    REVIEW OF SYSTEMS:  General: NAD, hemodynamically stable, (-)  fever, (-) chills, - weakness  HEENT:  Eyes:  No visual loss, blurred vision, double vision  sneezing, congestion, runny nose or sore throat.  SKIN:  No rash or itching.  CARDIOVASCULAR:  No chest pain, chest pressure or chest discomfort. No palpitations or edema.  RESPIRATORY:  no shortness of breath, minor dry cough.  GASTROINTESTINAL:  No anorexia, nausea, vomiting or diarrhea. No abdominal pain or blood.  NEUROLOGICAL:  No headache, dizziness, numbness or tingling in the extremities.   MUSCULOSKELETAL:  No muscle, back pain, joint pain or stiffness.  ENDOCRINOLOGIC:  No reports of sweating, cold or heat intolerance.   ALLERGIES:   hives, eczema or rhinitis.    ICU Vital Signs Last 24 Hrs  T(C): 36.7 (06 Sep 2017 21:46), Max: 36.9 (06 Sep 2017 11:56)  T(F): 98.1 (06 Sep 2017 21:46), Max: 98.5 (06 Sep 2017 11:56)  HR: 72 (07 Sep 2017 08:06) (52 - 97)  BP: 139/53 (06 Sep 2017 21:46) (117/55 - 139/53)  BP(mean): 80 (06 Sep 2017 11:56) (80 - 80)  RR: 18 (06 Sep 2017 21:46) (18 - 199)  SpO2: 96% (06 Sep 2017 21:46) (96% - 97%)    PHYSICAL EXAM:  GENERAL: NAD,   HEAD:  NC/AT  EYES: EOMI, PERRLA, no scleral icterus  HEENT: Moist mucous membranes  NECK: Supple, No JVD  CNS:  Alert & Oriented X3,  LUNG: prolonged expiration and no wheezing, no ronchi, no rales,  HEART: RRR; No murmurs, rubs, or gallops  ABDOMEN: +BS, ST/ND/NT  EXTREMITIES:  2+ Peripheral Pulses, No clubbing, cyanosis, or b/l leg swelling noted.     Labs    CARDIAC MARKERS ( 03 Sep 2017 04:00 )  <0.015 ng/mL / x     / x     / x     / x      CARDIAC MARKERS ( 02 Sep 2017 22:27 )  <0.015 ng/mL / x     / x     / x     / x      CARDIAC MARKERS ( 02 Sep 2017 18:58 )  <0.015 ng/mL / x     / x     / x     / x        Labs                        13.1   10.4  )-----------( 234      ( 06 Sep 2017 06:37 )             38.8     06 Sep 2017 06:37    136    |  104    |  34     ----------------------------<  157    4.7     |  25     |  1.08     Ca    9.7        06 Sep 2017 06:37        CAPILLARY BLOOD GLUCOSE  206 (07 Sep 2017 07:43)  319 (06 Sep 2017 21:18)  376 (06 Sep 2017 16:52)  368 (06 Sep 2017 11:54)        Tests:     Rapid Respiratory Viral Panel (09.02.17 @ 21:31)    Rapid RVP Result: NotDete: The FilmArray RVP Rapid uses polymerase chain reaction (PCR) and melt  curve analysis to screen for adenovirus; coronavirus HKU1, NL63, 229E,  OC43; human metapneumovirus (hMPV); human enterovirus/rhinovirus  (Entero/RV); influenza A; influenza A/H1;NotDetec: influenza A/H3; influenza  A/H1-2009; influenza B; parainfluenza viruses 1, 2, 3, 4; respiratory  syncytial virus; Bordetella pertussis; Mycoplasma pneumoniae; and  Chlamydophila pneumoniae.      < from: CT Angio Chest PE Protocol w/ IV Cont (09.02.17 @ 22:19) >  FINDINGS:  There is no pulmonary embolus. The main pulmonary artery is normal   caliber.    The trachea and main bronchi are patent. There is mild diffuse   centrilobular emphysema. There is no lobar consolidation. There are no   pleural effusions. There is no pneumothorax.    Median sternotomy and coronary artery bypass graft. Coronary artery   calcifications. Normal heart size. There is no pericardial effusion. The   thoracic aorta is normal caliber without aneurysm or dissection.    The thyroid gland is unremarkable. There is no axillary, mediastinal or   hilar lymphadenopathy.     The visualized upper abdomen demonstrates cholecystectomy. The remaining   upper abdominal organs are within normal limits.    The visualized bones do not demonstrateabnormality.     IMPRESSION:  No pulmonary embolus. Mild diffuse centrilobular emphysema.      < from: 12 Lead ECG (09.02.17 @ 19:00) >  Diagnosis Line Sinus tachycardia with frequent Premature ventricular complexes  Nonspecific T wave abnormality  Abnormal ECG  When compared with ECG of 04-AUG-2017 22:39,  Pre< from: Upper Endoscopy (09.07.17 @ 10:44) >  mature ventricular complexes are now Present  Questionable change in QRS axis  Nonspecific T wave abnormality, worse in Lateral leads  Confirmed by KRYSTAL URIOSTEGUI MD ([id number]) on 9/3/2017 2:21:06 PM      EGD on 9/7/2017: Dr. Kaden Woodward    A small hiatus hernia was found. The proximal extent of the gastric folds (end of tubular esophagus) was 38 cm from the incisors. The hiatal narrowing was 41 cm from the incisors.The esophagus was otherwise normal.Two biopsies were obtained with cold forceps for evaluation of eosinophilic esophagitis in the lower and middle third of the esophagus. There was a moderate amount ofretained food in the stomach c/w gastroparesis. The stomach was otherwise normal. Multiple biopsies were obtained in the gastric antrum with cold forceps for Helicobacter pylori testing. There was a 1 cm polyp in the second portion of the duodenum and a few polyps from 6-8 mm in the distal bulb. Biopsies were obtained. The examined duodenum was otherwise normal. No immediate complications. Small hiatus hernia. Retained food in the stomach c/w gastroparesis. Duodenal polyps.Recommendation: Await pathology results. Continue present medications Patient has a contact number available for  emergencies. The signs and symptoms of potential delayed complications were discussed with the patient. Return to normal activities tomorrow. Written discharge  instructions were provided to the patient. Resume previous diet.

## 2017-09-07 NOTE — PROGRESS NOTE ADULT - ATTENDING COMMENTS
Patient seen and examined with Portillo Gifford, Matt Mustafa, Tommy Graves, and Niya Stout on the Family Medicine Teaching Service.  Agree with history, physical, labs and plan which were reviewed in detail.
Patient seen and examined with Portillo Gifford, Tommy Graves, and Niya Stout on the Family Medicine Teaching Service.  Agree with history, physical, labs and plan which were reviewed in detail.

## 2017-09-07 NOTE — DISCHARGE NOTE ADULT - SECONDARY DIAGNOSIS.
Diabetes mellitus Hypertension Renal failure (ARF), acute on chronic Prophylactic measure Lung cancer

## 2017-09-07 NOTE — PROGRESS NOTE ADULT - PROVIDER SPECIALTY LIST ADULT
Family Medicine
Gastroenterology
Family Medicine

## 2017-09-07 NOTE — PROGRESS NOTE ADULT - PROBLEM SELECTOR PLAN 2
continue to monitor BS  continue lisinopril 2.5mg

## 2017-09-07 NOTE — DISCHARGE NOTE ADULT - CARE PLAN
Principal Discharge DX:	COPD exacerbation  Goal:	control breathing and prevent further attacks  Instructions for follow-up, activity and diet:	patient was educated and discussion was done on smoking cessation.  take nebulzier and inhaler as directed.  continue to take steroids (with tapering dose)  f/u with pulmonologist  Secondary Diagnosis:	Diabetes mellitus  Goal:	control blood sugar  Instructions for follow-up, activity and diet:	monitor BS daily  take diabetic medication as directed  encouraged low carbohydrate diet  Secondary Diagnosis:	Hypertension  Goal:	monitor blood pressure  Instructions for follow-up, activity and diet:	take blood pressure  low sodium intake  take medication as directed  Secondary Diagnosis:	Renal failure (ARF), acute on chronic  Goal:	maintain renal function  Instructions for follow-up, activity and diet:	low sodium diet  follow up with nephrology  Secondary Diagnosis:	Prophylactic measure  Goal:	preventitive medicine  Instructions for follow-up, activity and diet:	smoking and alcohol cessation recommended  take medication as directed  Secondary Diagnosis:	Lung cancer  Goal:	follow up with  who gave diagnosis.  Instructions for follow-up, activity and diet:	medical records from [facility] [place] were obtained and no evidence of diagnosis was found.   please follow up with doctor who diagnosed you with lung cancer Principal Discharge DX:	COPD exacerbation  Goal:	control breathing and prevent further attacks  Instructions for follow-up, activity and diet:	patient was educated and discussion was done on smoking cessation.  take nebulzier and inhaler as directed.  continue to take steroids (with tapering dose)  f/u with pulmonologist  Secondary Diagnosis:	Diabetes mellitus  Goal:	control blood sugar  Instructions for follow-up, activity and diet:	monitor BS daily  take diabetic medication as directed  encouraged low carbohydrate diet  Secondary Diagnosis:	Hypertension  Goal:	monitor blood pressure  Instructions for follow-up, activity and diet:	take blood pressure  low sodium intake  take medication as directed  Secondary Diagnosis:	Renal failure (ARF), acute on chronic  Goal:	maintain renal function  Instructions for follow-up, activity and diet:	low sodium diet  follow up with nephrology  Secondary Diagnosis:	Prophylactic measure  Goal:	preventative medicine  Instructions for follow-up, activity and diet:	smoking and alcohol cessation recommended  take medication as directed  Secondary Diagnosis:	Lung cancer  Goal:	follow up with dr who gave diagnosis.  Instructions for follow-up, activity and diet:	medical records from [facility] [place] were obtained and no evidence of diagnosis was found.   please follow up with doctor who diagnosed you with lung cancer Principal Discharge DX:	COPD exacerbation  Goal:	control breathing and prevent further attacks  Instructions for follow-up, activity and diet:	patient was educated and discussion was done on smoking cessation.  take nebulzier and inhaler as directed.  continue to take steroids (with tapering dose)  f/u with pulmonologist  Secondary Diagnosis:	Diabetes mellitus  Goal:	control blood sugar  Instructions for follow-up, activity and diet:	monitor BS daily  take diabetic medication as directed  encouraged low carbohydrate diet  Secondary Diagnosis:	Hypertension  Goal:	monitor blood pressure  Instructions for follow-up, activity and diet:	take blood pressure  low sodium intake  take medication as directed  Secondary Diagnosis:	Renal failure (ARF), acute on chronic  Goal:	maintain renal function  Instructions for follow-up, activity and diet:	low sodium diet  follow up with nephrology  Secondary Diagnosis:	Prophylactic measure  Goal:	preventative medicine  Instructions for follow-up, activity and diet:	smoking and alcohol cessation recommended  take medication as directed  Secondary Diagnosis:	Lung cancer  Goal:	follow up with dr who gave diagnosis.  Instructions for follow-up, activity and diet:	medical records from [facility] [place] were obtained and no evidence of diagnosis was found.   please follow up with doctor who diagnosed you with lung cancer Principal Discharge DX:	COPD exacerbation  Goal:	control breathing and prevent further attacks  Instructions for follow-up, activity and diet:	patient was educated and discussion was done on smoking cessation.  take nebulzier and inhaler as directed.  continue to take steroids (with tapering dose)  f/u with pulmonologist  Secondary Diagnosis:	Diabetes mellitus  Goal:	control blood sugar  Instructions for follow-up, activity and diet:	monitor BS daily  take diabetic medication as directed  encouraged low carbohydrate diet  Secondary Diagnosis:	Hypertension  Goal:	monitor blood pressure  Instructions for follow-up, activity and diet:	take blood pressure  low sodium intake  take medication as directed  Secondary Diagnosis:	Renal failure (ARF), acute on chronic  Goal:	maintain renal function  Instructions for follow-up, activity and diet:	low sodium diet  follow up with nephrology  Secondary Diagnosis:	Prophylactic measure  Goal:	preventative medicine  Instructions for follow-up, activity and diet:	smoking and alcohol cessation recommended  take medication as directed  Secondary Diagnosis:	Lung cancer  Goal:	follow up with dr who gave diagnosis.  Instructions for follow-up, activity and diet:	medical records from [facility] [place] were obtained and no evidence of diagnosis was found.   please follow up with doctor who diagnosed you with lung cancer Principal Discharge DX:	COPD exacerbation  Goal:	control breathing and prevent further attacks  Instructions for follow-up, activity and diet:	patient was educated and discussion was done on smoking cessation.  take nebulzier and inhaler as directed.  continue to take steroids (with tapering dose)  f/u with pulmonologist  Secondary Diagnosis:	Diabetes mellitus  Goal:	control blood sugar  Instructions for follow-up, activity and diet:	monitor BS daily  take diabetic medication as directed  encouraged low carbohydrate diet  Secondary Diagnosis:	Hypertension  Goal:	monitor blood pressure  Instructions for follow-up, activity and diet:	take blood pressure  low sodium intake  take medication as directed  Secondary Diagnosis:	Renal failure (ARF), acute on chronic  Goal:	maintain renal function  Instructions for follow-up, activity and diet:	low sodium diet  follow up with nephrology  Secondary Diagnosis:	Prophylactic measure  Goal:	preventative medicine  Instructions for follow-up, activity and diet:	smoking and alcohol cessation recommended  take medication as directed  Secondary Diagnosis:	Lung cancer  Goal:	follow up with dr who gave diagnosis.  Instructions for follow-up, activity and diet:	medical records from [facility] [place] were obtained and no evidence of diagnosis was found.   please follow up with doctor who diagnosed you with lung cancer

## 2017-09-07 NOTE — PROGRESS NOTE ADULT - PROBLEM SELECTOR PLAN 5
continue atorvastatin 40mg oral at bedtime  continue aspirin 81mg.

## 2017-09-07 NOTE — PROGRESS NOTE ADULT - PROBLEM SELECTOR PLAN 7
spoke to Dr Woodward f/u with EGD results s/p procedure today done with Dr. Woodward spoke to Dr Woodward after procedure. Pt is clear to discharge. spoke to Dr Woodward after procedure. Pt is clear to discharge. EGD showed hital hernia. f/u with Dr Woodward after discharge.

## 2017-09-07 NOTE — PROGRESS NOTE ADULT - PROBLEM SELECTOR PLAN 4
[place] [facility] [place] [place] with [place] cancer?  pt is an unclear source
[place] [facility] [place]?  pt is an unclear source
f/u tomorrow call [facility] [place] to see who diagnosed her with lung cancer?  pt is an unclear source
f/u tomorrow call [facility] [place] to see who diagnosed her with lung cancer?  pt is an unclear source
f/u tomorrow call [facility] [place] to see who diagnosed her with lung cancer?  pt is an unclear source

## 2017-09-07 NOTE — DISCHARGE NOTE ADULT - PATIENT PORTAL LINK FT
“You can access the FollowHealth Patient Portal, offered by [facility], by registering with the following website: [email or web address]”

## 2017-09-07 NOTE — PROGRESS NOTE ADULT - PROBLEM SELECTOR PROBLEM 5
CAD (coronary artery disease)

## 2017-09-07 NOTE — DISCHARGE NOTE ADULT - HOSPITAL COURSE
- 57 y F with PMHx DM, HTN, CAD ( s/p CABG), COPD on home O2 , [facility] who complained of shortness of breath, pleuritic chest pain and cough on 9/2/2017.     In the ED, she received a CTA which was negative for PE.  CXR did not not show any pathology. She was given 3x nebulizer treatments en route to the hospital- had symptomatic relief.     During patient stay at , patient has received treatment for her COPD exacerbation with steroids, duoneb, oxygen, fluids and treatment for her dysphiga and she tolerated treatment well.      In addition, pt received a endoscopy with Dr Woodward (EGD) and tolerated the procedure well which showed a hital hernia. Dr Woodward stated pt is ready to be discharged.     According to the patient she was diagnosed as having lung cancer at [facility] but did not follow up with recommended therapy. Family Medicine team asked for all records from [facility] to be faxed over, however, no documentation was found on her being diagnosed with lung cancer. Pt was advised to follow up with physician who diagnosed her with lung cancer.  Pt agrees and understands to management of care.     MEDICATIONS  (STANDING):  imipramine 50 milliGRAM(s) Oral at bedtime  atorvastatin 40 milliGRAM(s) Oral at bedtime  tiotropium 18 MICROgram(s) Capsule 1 Capsule(s) Inhalation daily  famotidine    Tablet 20 milliGRAM(s) Oral daily  oxybutynin 5 milliGRAM(s) Oral four times a day  aspirin enteric coated 81 milliGRAM(s) Oral daily  lisinopril 2.5 milliGRAM(s) Oral daily  ALBUTerol/ipratropium for Nebulization 3 milliLiter(s) Nebulizer every 6 hours  enoxaparin Injectable 40 milliGRAM(s) SubCutaneous every 24 hours  gabapentin 300 milliGRAM(s) Oral every 8 hours  insulin glargine Injectable (LANTUS) 18 Unit(s) SubCutaneous two times a day  insulin lispro (HumaLOG) corrective regimen sliding scale   SubCutaneous three times a day before meals  insulin lispro (HumaLOG) corrective regimen sliding scale   SubCutaneous at bedtime  dextrose 5%. 1000 milliLiter(s) (50 mL/Hr) IV Continuous <Continuous>  dextrose 50% Injectable 12.5 Gram(s) IV Push once  dextrose 50% Injectable 25 Gram(s) IV Push once  dextrose 50% Injectable 25 Gram(s) IV Push once  predniSONE   Tablet 30 milliGRAM(s) Oral daily    MEDICATIONS  (PRN):  ALBUTerol    90 MICROgram(s) HFA Inhaler 2 Puff(s) Inhalation every 6 hours PRN Shortness of Breath and/or Wheezing  dextrose Gel 1 Dose(s) Oral once PRN Blood Glucose LESS THAN 70 milliGRAM(s)/deciliter  glucagon  Injectable 1 milliGRAM(s) IntraMuscular once PRN Glucose LESS THAN 70 milligrams/deciliter  guaiFENesin   Syrup  (Sugar-Free) 200 milliGRAM(s) Oral every 6 hours PRN Cough  acetaminophen   Tablet. 650 milliGRAM(s) Oral once PRN Mild Pain (1 - 3)  melatonin 3 milliGRAM(s) Oral at bedtime PRN Insomnia  ondansetron Injectable 4 milliGRAM(s) IV Push every 6 hours PRN Nausea and/or Vomiting  metoclopramide 10 milliGRAM(s) Oral four times a day PRN nausea and/or vomiting        Tests:     Rapid Respiratory Viral Panel (09.02.17 @ 21:31)    Rapid RVP Result: NotDete: The FilmArray RVP Rapid uses polymerase chain reaction (PCR) and melt  curve analysis to screen for adenovirus; coronavirus HKU1, NL63, 229E,  OC43; human metapneumovirus (hMPV); human enterovirus/rhinovirus  (Entero/RV); influenza A; influenza A/H1;NotDetec: influenza A/H3; influenza  A/H1-2009; influenza B; parainfluenza viruses 1, 2, 3, 4; respiratory  syncytial virus; Bordetella pertussis; Mycoplasma pneumoniae; and  Chlamydophila pneumoniae.      < from: CT Angio Chest PE Protocol w/ IV Cont (09.02.17 @ 22:19) >  IMPRESSION:  No pulmonary embolus. Mild diffuse centrilobular emphysema.    < from: 12 Lead ECG (09.02.17 @ 19:00) >  Diagnosis Line Sinus tachycardia with frequent Premature ventricular complexes  Nonspecific T wave abnormality  Abnormal ECG  When compared with ECG of 04-AUG-2017 22:39,  Pre< from: Upper Endoscopy (09.07.17 @ 10:44) >  mature ventricular complexes are now Present  Questionable change in QRS axis  Nonspecific T wave abnormality, worse in Lateral leads  Confirmed by MOODY MD, KRYSTAL ([id number]) on 9/3/2017 2:21:06 PM      EGD 9/7/2017 - dr. rio woodward  A small hiatus hernia was found. The proximal extent of the gastric folds (end of tubular esophagus) was 38 cm from the incisors. The hiatal narrowing was 41 cm from the incisors.The esophagus was otherwise normal.Two biopsies were obtained with cold forceps for evaluation of eosinophilic esophagitis in the lower and middle third of the esophagus. There was a moderate amount ofretained food in the stomach c/w   gastroparesis. The stomach was otherwise normal. Multiple biopsies were obtained in the gastric antrum with cold forceps for Helicobacter pylori testing. There was a 1 cm polyp in the second portion of the duodenum and a few polyps from 6-8 mm in the distal bulb. Biopsies were obtained. The examined duodenum was otherwise normal.  No immediate complications. Small hiatus hernia. Retained food in the stomach c/w gastroparesis.Duodenal polyps.  Recommendation:Await pathology results.  Continue present medications.Patient has a contact number available for   emergencies.The signs and symptoms of potential delayed complications were discussed with the patient. Return to normal activities tomorrow. Written discharge    instructions were provided to the patient. Resume previous diet.

## 2017-09-12 DIAGNOSIS — C34.90 MALIGNANT NEOPLASM OF UNSPECIFIED PART OF UNSPECIFIED BRONCHUS OR LUNG: ICD-10-CM

## 2017-09-12 DIAGNOSIS — N18.2 CHRONIC KIDNEY DISEASE, STAGE 2 (MILD): ICD-10-CM

## 2017-09-12 DIAGNOSIS — Z95.1 PRESENCE OF AORTOCORONARY BYPASS GRAFT: ICD-10-CM

## 2017-09-12 DIAGNOSIS — K31.84 GASTROPARESIS: ICD-10-CM

## 2017-09-12 DIAGNOSIS — Z59.0 HOMELESSNESS: ICD-10-CM

## 2017-09-12 DIAGNOSIS — J44.1 CHRONIC OBSTRUCTIVE PULMONARY DISEASE WITH (ACUTE) EXACERBATION: ICD-10-CM

## 2017-09-12 DIAGNOSIS — Z99.81 DEPENDENCE ON SUPPLEMENTAL OXYGEN: ICD-10-CM

## 2017-09-12 DIAGNOSIS — E86.0 DEHYDRATION: ICD-10-CM

## 2017-09-12 DIAGNOSIS — N17.9 ACUTE KIDNEY FAILURE, UNSPECIFIED: ICD-10-CM

## 2017-09-12 DIAGNOSIS — R13.10 DYSPHAGIA, UNSPECIFIED: ICD-10-CM

## 2017-09-12 DIAGNOSIS — E11.43 TYPE 2 DIABETES MELLITUS WITH DIABETIC AUTONOMIC (POLY)NEUROPATHY: ICD-10-CM

## 2017-09-12 DIAGNOSIS — Z79.4 LONG TERM (CURRENT) USE OF INSULIN: ICD-10-CM

## 2017-09-12 DIAGNOSIS — I12.9 HYPERTENSIVE CHRONIC KIDNEY DISEASE WITH STAGE 1 THROUGH STAGE 4 CHRONIC KIDNEY DISEASE, OR UNSPECIFIED CHRONIC KIDNEY DISEASE: ICD-10-CM

## 2017-09-12 DIAGNOSIS — I25.10 ATHEROSCLEROTIC HEART DISEASE OF NATIVE CORONARY ARTERY WITHOUT ANGINA PECTORIS: ICD-10-CM

## 2017-09-12 DIAGNOSIS — F17.200 NICOTINE DEPENDENCE, UNSPECIFIED, UNCOMPLICATED: ICD-10-CM

## 2017-09-12 DIAGNOSIS — K44.9 DIAPHRAGMATIC HERNIA WITHOUT OBSTRUCTION OR GANGRENE: ICD-10-CM

## 2017-09-12 DIAGNOSIS — Z79.82 LONG TERM (CURRENT) USE OF ASPIRIN: ICD-10-CM

## 2017-09-12 DIAGNOSIS — R13.12 DYSPHAGIA, OROPHARYNGEAL PHASE: ICD-10-CM

## 2017-09-12 LAB — SURGICAL PATHOLOGY FINAL REPORT - CH: SIGNIFICANT CHANGE UP

## 2017-09-12 SDOH — ECONOMIC STABILITY - HOUSING INSECURITY: HOMELESSNESS: Z59.0

## 2017-10-27 ENCOUNTER — INPATIENT (INPATIENT)
Facility: HOSPITAL | Age: 57
LOS: 4 days | Discharge: HOME | End: 2017-11-01
Attending: HOSPITALIST | Admitting: HOSPITALIST
Payer: MEDICAID

## 2017-10-27 VITALS — WEIGHT: 169.98 LBS

## 2017-10-27 LAB
ACETONE SERPL-MCNC: (no result)
ADD ON TEST-SPECIMEN IN LAB: SIGNIFICANT CHANGE UP
ALBUMIN SERPL ELPH-MCNC: 3 G/DL — LOW (ref 3.3–5)
ALP SERPL-CCNC: 77 U/L — SIGNIFICANT CHANGE UP (ref 40–120)
ALT FLD-CCNC: 15 U/L — SIGNIFICANT CHANGE UP (ref 12–78)
ANION GAP SERPL CALC-SCNC: 6 MMOL/L — SIGNIFICANT CHANGE UP (ref 5–17)
APTT BLD: 29 SEC — SIGNIFICANT CHANGE UP (ref 27.5–37.4)
AST SERPL-CCNC: 13 U/L — LOW (ref 15–37)
BASE EXCESS BLDV CALC-SCNC: 1.5 MMOL/L — SIGNIFICANT CHANGE UP (ref -2–2)
BASOPHILS # BLD AUTO: 0.1 K/UL — SIGNIFICANT CHANGE UP (ref 0–0.2)
BASOPHILS NFR BLD AUTO: 0.6 % — SIGNIFICANT CHANGE UP (ref 0–2)
BILIRUB SERPL-MCNC: 0.5 MG/DL — SIGNIFICANT CHANGE UP (ref 0.2–1.2)
BUN SERPL-MCNC: 23 MG/DL — SIGNIFICANT CHANGE UP (ref 7–23)
CALCIUM SERPL-MCNC: 8.7 MG/DL — SIGNIFICANT CHANGE UP (ref 8.5–10.1)
CHLORIDE SERPL-SCNC: 105 MMOL/L — SIGNIFICANT CHANGE UP (ref 96–108)
CO2 SERPL-SCNC: 26 MMOL/L — SIGNIFICANT CHANGE UP (ref 22–31)
CREAT SERPL-MCNC: 1.18 MG/DL — SIGNIFICANT CHANGE UP (ref 0.5–1.3)
EOSINOPHIL # BLD AUTO: 0.1 K/UL — SIGNIFICANT CHANGE UP (ref 0–0.5)
EOSINOPHIL NFR BLD AUTO: 0.8 % — SIGNIFICANT CHANGE UP (ref 0–6)
GLUCOSE SERPL-MCNC: 266 MG/DL — HIGH (ref 70–99)
HCO3 BLDV-SCNC: 26 MMOL/L — SIGNIFICANT CHANGE UP (ref 21–29)
HCT VFR BLD CALC: 36.1 % — SIGNIFICANT CHANGE UP (ref 34.5–45)
HGB BLD-MCNC: 11.7 G/DL — SIGNIFICANT CHANGE UP (ref 11.5–15.5)
INR BLD: 1.14 RATIO — SIGNIFICANT CHANGE UP (ref 0.88–1.16)
LYMPHOCYTES # BLD AUTO: 1.4 K/UL — SIGNIFICANT CHANGE UP (ref 1–3.3)
LYMPHOCYTES # BLD AUTO: 8.9 % — LOW (ref 13–44)
MAGNESIUM SERPL-MCNC: 1.6 MG/DL — SIGNIFICANT CHANGE UP (ref 1.6–2.6)
MCHC RBC-ENTMCNC: 27.4 PG — SIGNIFICANT CHANGE UP (ref 27–34)
MCHC RBC-ENTMCNC: 32.4 GM/DL — SIGNIFICANT CHANGE UP (ref 32–36)
MCV RBC AUTO: 84.6 FL — SIGNIFICANT CHANGE UP (ref 80–100)
MONOCYTES # BLD AUTO: 0.6 K/UL — SIGNIFICANT CHANGE UP (ref 0–0.9)
MONOCYTES NFR BLD AUTO: 4.1 % — SIGNIFICANT CHANGE UP (ref 2–14)
NEUTROPHILS # BLD AUTO: 13.4 K/UL — HIGH (ref 1.8–7.4)
NEUTROPHILS NFR BLD AUTO: 85.6 % — HIGH (ref 43–77)
NT-PROBNP SERPL-SCNC: 1865 PG/ML — HIGH (ref 0–125)
PCO2 BLDV: 42 MMHG — SIGNIFICANT CHANGE UP (ref 35–50)
PH BLDV: 7.4 — SIGNIFICANT CHANGE UP (ref 7.35–7.45)
PLATELET # BLD AUTO: 279 K/UL — SIGNIFICANT CHANGE UP (ref 150–400)
PO2 BLDV: 43 MMHG — SIGNIFICANT CHANGE UP (ref 25–45)
POTASSIUM SERPL-MCNC: 4.2 MMOL/L — SIGNIFICANT CHANGE UP (ref 3.5–5.3)
POTASSIUM SERPL-SCNC: 4.2 MMOL/L — SIGNIFICANT CHANGE UP (ref 3.5–5.3)
PROT SERPL-MCNC: 6.8 GM/DL — SIGNIFICANT CHANGE UP (ref 6–8.3)
PROTHROM AB SERPL-ACNC: 12.3 SEC — SIGNIFICANT CHANGE UP (ref 9.8–12.7)
RBC # BLD: 4.26 M/UL — SIGNIFICANT CHANGE UP (ref 3.8–5.2)
RBC # FLD: 13.4 % — SIGNIFICANT CHANGE UP (ref 10.3–14.5)
SAO2 % BLDV: 78 % — SIGNIFICANT CHANGE UP (ref 67–88)
SODIUM SERPL-SCNC: 137 MMOL/L — SIGNIFICANT CHANGE UP (ref 135–145)
TROPONIN I SERPL-MCNC: <0.015 NG/ML — SIGNIFICANT CHANGE UP (ref 0.01–0.04)
WBC # BLD: 15.7 K/UL — HIGH (ref 3.8–10.5)
WBC # FLD AUTO: 15.7 K/UL — HIGH (ref 3.8–10.5)

## 2017-10-27 PROCEDURE — 74177 CT ABD & PELVIS W/CONTRAST: CPT | Mod: 26

## 2017-10-27 PROCEDURE — 99285 EMERGENCY DEPT VISIT HI MDM: CPT

## 2017-10-27 PROCEDURE — 93010 ELECTROCARDIOGRAM REPORT: CPT

## 2017-10-27 RX ORDER — MORPHINE SULFATE 50 MG/1
4 CAPSULE, EXTENDED RELEASE ORAL ONCE
Qty: 0 | Refills: 0 | Status: DISCONTINUED | OUTPATIENT
Start: 2017-10-27 | End: 2017-10-27

## 2017-10-27 RX ORDER — SODIUM CHLORIDE 9 MG/ML
1000 INJECTION INTRAMUSCULAR; INTRAVENOUS; SUBCUTANEOUS ONCE
Qty: 0 | Refills: 0 | Status: COMPLETED | OUTPATIENT
Start: 2017-10-27 | End: 2017-10-27

## 2017-10-27 RX ORDER — ONDANSETRON 8 MG/1
4 TABLET, FILM COATED ORAL ONCE
Qty: 0 | Refills: 0 | Status: COMPLETED | OUTPATIENT
Start: 2017-10-27 | End: 2017-10-27

## 2017-10-27 RX ADMIN — SODIUM CHLORIDE 1000 MILLILITER(S): 9 INJECTION INTRAMUSCULAR; INTRAVENOUS; SUBCUTANEOUS at 20:23

## 2017-10-27 RX ADMIN — MORPHINE SULFATE 4 MILLIGRAM(S): 50 CAPSULE, EXTENDED RELEASE ORAL at 21:50

## 2017-10-27 RX ADMIN — ONDANSETRON 4 MILLIGRAM(S): 8 TABLET, FILM COATED ORAL at 21:50

## 2017-10-27 NOTE — ED PROVIDER NOTE - CARE PLAN
Principal Discharge DX:	Chest pain with moderate risk of acute coronary syndrome  Secondary Diagnosis:	Uncontrolled diabetes mellitus  Secondary Diagnosis:	Nausea & vomiting

## 2017-10-27 NOTE — ED PROVIDER NOTE - OBJECTIVE STATEMENT
56 y/o PMHx IDDM, HTN, COPD, lung ca, triple bypass, urinary incontinence, presents to the ED c/o chest pain. The pt provides that she has left sided chest pain vague in quality radiating to the left shoulder. The pt notes that when she breathes in the chest hurts more, associated with productive cough and white-yellow sputum. +Abd pain, nausea. No h/o fever, chills, dizziness, rash or urinary incontinence.

## 2017-10-27 NOTE — ED PROVIDER NOTE - CHPI ED SYMPTOMS NEG
no back pain/no vomiting/no dizziness/no diaphoresis/no chills/no nausea/no shortness of breath/no syncope

## 2017-10-27 NOTE — ED PROVIDER NOTE - GASTROINTESTINAL, MLM
Abdomen soft, mild para suprapubic tender, no guarding, no rebound. Bowel sounds hypoactive normal pitched.

## 2017-10-27 NOTE — ED STATDOCS - NS_ ATTENDINGSCRIBEDETAILS _ED_A_ED_FT
I, Fabrizio Victor MD,  performed the initial face to face bedside interview with this patient regarding history of present illness, review of symptoms and relevant past medical, social and family history.  I completed an independent physical examination.    The history, relevant review of systems, past medical and surgical history, medical decision making, and physical examination was documented by the scribe in my presence and I attest to the accuracy of the documentation.

## 2017-10-27 NOTE — ED PROVIDER NOTE - CONSTITUTIONAL, MLM
normal... Elderly obese female alert no respi distress + mild discomfort secondary to pain, nontoxic mildly ill appearing.  awake, alert, oriented to person, place, time/situation

## 2017-10-27 NOTE — ED ADULT NURSE NOTE - OBJECTIVE STATEMENT
Pt alert and oriented x3. Pt presents with chest pain SOB and nausea x few days. Pt hx COPD and lung ca. EKG completed. Pt denies dizziness. IV placed. Labs drawn

## 2017-10-27 NOTE — ED PROVIDER NOTE - CARDIAC, MLM
Normal radial pulse Normal rate, regular rhythm.  Heart sounds S1, S2.  No murmurs, rubs or gallops.

## 2017-10-27 NOTE — ED STATDOCS - PROGRESS NOTE DETAILS
58 y/o female with PMHx of COPD, IDDM presents to the ED c/o chest pain worsening x2 days. Pain is described as sharp, dull, pleuritic in nature. +Coughing. Pt states current sx are recurrent. Has not taken insulin in 3 days. Currently takes Albuterol via nebulizer. No steroids. Current smoker (1 ppd). PMD [facility]. Will send pt to the Main ED for further evaluation.

## 2017-10-27 NOTE — ED PROVIDER NOTE - MEDICAL DECISION MAKING DETAILS
56 y/o F c/o abd pain, cp, cough, PMHx IDDM, lung ca, to obtain labs, meds, ekg, cardiac workup, cxr, likely admit.

## 2017-10-28 DIAGNOSIS — E11.65 TYPE 2 DIABETES MELLITUS WITH HYPERGLYCEMIA: ICD-10-CM

## 2017-10-28 DIAGNOSIS — J44.9 CHRONIC OBSTRUCTIVE PULMONARY DISEASE, UNSPECIFIED: ICD-10-CM

## 2017-10-28 DIAGNOSIS — C34.90 MALIGNANT NEOPLASM OF UNSPECIFIED PART OF UNSPECIFIED BRONCHUS OR LUNG: ICD-10-CM

## 2017-10-28 DIAGNOSIS — R06.00 DYSPNEA, UNSPECIFIED: ICD-10-CM

## 2017-10-28 LAB
ANION GAP SERPL CALC-SCNC: 4 MMOL/L — LOW (ref 5–17)
BUN SERPL-MCNC: 19 MG/DL — SIGNIFICANT CHANGE UP (ref 7–23)
CALCIUM SERPL-MCNC: 8.4 MG/DL — LOW (ref 8.5–10.1)
CHLORIDE SERPL-SCNC: 109 MMOL/L — HIGH (ref 96–108)
CO2 SERPL-SCNC: 28 MMOL/L — SIGNIFICANT CHANGE UP (ref 22–31)
CREAT SERPL-MCNC: 0.9 MG/DL — SIGNIFICANT CHANGE UP (ref 0.5–1.3)
GLUCOSE BLDC GLUCOMTR-MCNC: 114 MG/DL — HIGH (ref 70–99)
GLUCOSE BLDC GLUCOMTR-MCNC: 139 MG/DL — HIGH (ref 70–99)
GLUCOSE BLDC GLUCOMTR-MCNC: 176 MG/DL — HIGH (ref 70–99)
GLUCOSE BLDC GLUCOMTR-MCNC: 197 MG/DL — HIGH (ref 70–99)
GLUCOSE BLDC GLUCOMTR-MCNC: 86 MG/DL — SIGNIFICANT CHANGE UP (ref 70–99)
GLUCOSE SERPL-MCNC: 77 MG/DL — SIGNIFICANT CHANGE UP (ref 70–99)
HBA1C BLD-MCNC: 10.1 % — HIGH (ref 4–5.6)
POTASSIUM SERPL-MCNC: 3.9 MMOL/L — SIGNIFICANT CHANGE UP (ref 3.5–5.3)
POTASSIUM SERPL-SCNC: 3.9 MMOL/L — SIGNIFICANT CHANGE UP (ref 3.5–5.3)
SODIUM SERPL-SCNC: 141 MMOL/L — SIGNIFICANT CHANGE UP (ref 135–145)
TROPONIN I SERPL-MCNC: 0.02 NG/ML — SIGNIFICANT CHANGE UP (ref 0.01–0.04)

## 2017-10-28 PROCEDURE — 99222 1ST HOSP IP/OBS MODERATE 55: CPT

## 2017-10-28 PROCEDURE — 93010 ELECTROCARDIOGRAM REPORT: CPT

## 2017-10-28 RX ORDER — DIPHENHYDRAMINE HCL 50 MG
1 CAPSULE ORAL
Qty: 0 | Refills: 0 | COMMUNITY

## 2017-10-28 RX ORDER — METOPROLOL TARTRATE 50 MG
25 TABLET ORAL DAILY
Qty: 0 | Refills: 0 | Status: DISCONTINUED | OUTPATIENT
Start: 2017-10-28 | End: 2017-11-01

## 2017-10-28 RX ORDER — DEXTROSE 50 % IN WATER 50 %
25 SYRINGE (ML) INTRAVENOUS ONCE
Qty: 0 | Refills: 0 | Status: DISCONTINUED | OUTPATIENT
Start: 2017-10-28 | End: 2017-10-29

## 2017-10-28 RX ORDER — ATORVASTATIN CALCIUM 80 MG/1
40 TABLET, FILM COATED ORAL AT BEDTIME
Qty: 0 | Refills: 0 | Status: DISCONTINUED | OUTPATIENT
Start: 2017-10-28 | End: 2017-11-01

## 2017-10-28 RX ORDER — NICOTINE POLACRILEX 2 MG
1 GUM BUCCAL DAILY
Qty: 0 | Refills: 0 | Status: DISCONTINUED | OUTPATIENT
Start: 2017-10-28 | End: 2017-10-28

## 2017-10-28 RX ORDER — ALBUTEROL 90 UG/1
2 AEROSOL, METERED ORAL EVERY 6 HOURS
Qty: 0 | Refills: 0 | Status: DISCONTINUED | OUTPATIENT
Start: 2017-10-28 | End: 2017-11-01

## 2017-10-28 RX ORDER — ACETAMINOPHEN 500 MG
650 TABLET ORAL EVERY 6 HOURS
Qty: 0 | Refills: 0 | Status: DISCONTINUED | OUTPATIENT
Start: 2017-10-28 | End: 2017-11-01

## 2017-10-28 RX ORDER — OXYBUTYNIN CHLORIDE 5 MG
5 TABLET ORAL
Qty: 0 | Refills: 0 | Status: DISCONTINUED | OUTPATIENT
Start: 2017-10-28 | End: 2017-11-01

## 2017-10-28 RX ORDER — BUPROPION HYDROCHLORIDE 150 MG/1
1 TABLET, EXTENDED RELEASE ORAL
Qty: 0 | Refills: 0 | COMMUNITY

## 2017-10-28 RX ORDER — QUETIAPINE FUMARATE 200 MG/1
1 TABLET, FILM COATED ORAL
Qty: 0 | Refills: 0 | COMMUNITY

## 2017-10-28 RX ORDER — SODIUM CHLORIDE 9 MG/ML
1000 INJECTION, SOLUTION INTRAVENOUS
Qty: 0 | Refills: 0 | Status: DISCONTINUED | OUTPATIENT
Start: 2017-10-28 | End: 2017-11-01

## 2017-10-28 RX ORDER — ONDANSETRON 8 MG/1
4 TABLET, FILM COATED ORAL EVERY 6 HOURS
Qty: 0 | Refills: 0 | Status: DISCONTINUED | OUTPATIENT
Start: 2017-10-28 | End: 2017-11-01

## 2017-10-28 RX ORDER — QUETIAPINE FUMARATE 200 MG/1
50 TABLET, FILM COATED ORAL DAILY
Qty: 0 | Refills: 0 | Status: DISCONTINUED | OUTPATIENT
Start: 2017-10-28 | End: 2017-11-01

## 2017-10-28 RX ORDER — METOPROLOL TARTRATE 50 MG
1 TABLET ORAL
Qty: 0 | Refills: 0 | COMMUNITY

## 2017-10-28 RX ORDER — HEPARIN SODIUM 5000 [USP'U]/ML
5000 INJECTION INTRAVENOUS; SUBCUTANEOUS EVERY 12 HOURS
Qty: 0 | Refills: 0 | Status: DISCONTINUED | OUTPATIENT
Start: 2017-10-28 | End: 2017-11-01

## 2017-10-28 RX ORDER — BUPROPION HYDROCHLORIDE 150 MG/1
300 TABLET, EXTENDED RELEASE ORAL DAILY
Qty: 0 | Refills: 0 | Status: DISCONTINUED | OUTPATIENT
Start: 2017-10-28 | End: 2017-11-01

## 2017-10-28 RX ORDER — NICOTINE POLACRILEX 2 MG
1 GUM BUCCAL DAILY
Qty: 0 | Refills: 0 | Status: DISCONTINUED | OUTPATIENT
Start: 2017-10-28 | End: 2017-11-01

## 2017-10-28 RX ORDER — SODIUM CHLORIDE 9 MG/ML
1000 INJECTION INTRAMUSCULAR; INTRAVENOUS; SUBCUTANEOUS ONCE
Qty: 0 | Refills: 0 | Status: COMPLETED | OUTPATIENT
Start: 2017-10-28 | End: 2017-10-28

## 2017-10-28 RX ORDER — MONTELUKAST 4 MG/1
1 TABLET, CHEWABLE ORAL
Qty: 0 | Refills: 0 | COMMUNITY

## 2017-10-28 RX ORDER — CLOPIDOGREL BISULFATE 75 MG/1
75 TABLET, FILM COATED ORAL DAILY
Qty: 0 | Refills: 0 | Status: DISCONTINUED | OUTPATIENT
Start: 2017-10-28 | End: 2017-11-01

## 2017-10-28 RX ORDER — INSULIN LISPRO 100/ML
VIAL (ML) SUBCUTANEOUS AT BEDTIME
Qty: 0 | Refills: 0 | Status: DISCONTINUED | OUTPATIENT
Start: 2017-10-28 | End: 2017-10-29

## 2017-10-28 RX ORDER — DEXTROSE 50 % IN WATER 50 %
12.5 SYRINGE (ML) INTRAVENOUS ONCE
Qty: 0 | Refills: 0 | Status: DISCONTINUED | OUTPATIENT
Start: 2017-10-28 | End: 2017-10-29

## 2017-10-28 RX ORDER — INSULIN GLARGINE 100 [IU]/ML
60 INJECTION, SOLUTION SUBCUTANEOUS
Qty: 0 | Refills: 0 | Status: DISCONTINUED | OUTPATIENT
Start: 2017-10-28 | End: 2017-10-29

## 2017-10-28 RX ORDER — SODIUM CHLORIDE 9 MG/ML
1000 INJECTION INTRAMUSCULAR; INTRAVENOUS; SUBCUTANEOUS
Qty: 0 | Refills: 0 | Status: DISCONTINUED | OUTPATIENT
Start: 2017-10-28 | End: 2017-10-31

## 2017-10-28 RX ORDER — INSULIN LISPRO 100/ML
VIAL (ML) SUBCUTANEOUS
Qty: 0 | Refills: 0 | Status: DISCONTINUED | OUTPATIENT
Start: 2017-10-28 | End: 2017-10-29

## 2017-10-28 RX ORDER — CLOPIDOGREL BISULFATE 75 MG/1
1 TABLET, FILM COATED ORAL
Qty: 0 | Refills: 0 | COMMUNITY

## 2017-10-28 RX ORDER — PANTOPRAZOLE SODIUM 20 MG/1
40 TABLET, DELAYED RELEASE ORAL
Qty: 0 | Refills: 0 | Status: DISCONTINUED | OUTPATIENT
Start: 2017-10-28 | End: 2017-11-01

## 2017-10-28 RX ORDER — GLUCAGON INJECTION, SOLUTION 0.5 MG/.1ML
1 INJECTION, SOLUTION SUBCUTANEOUS ONCE
Qty: 0 | Refills: 0 | Status: DISCONTINUED | OUTPATIENT
Start: 2017-10-28 | End: 2017-10-29

## 2017-10-28 RX ORDER — BUDESONIDE, MICRONIZED 100 %
0.5 POWDER (GRAM) MISCELLANEOUS EVERY 12 HOURS
Qty: 0 | Refills: 0 | Status: DISCONTINUED | OUTPATIENT
Start: 2017-10-28 | End: 2017-10-30

## 2017-10-28 RX ORDER — IPRATROPIUM/ALBUTEROL SULFATE 18-103MCG
3 AEROSOL WITH ADAPTER (GRAM) INHALATION ONCE
Qty: 0 | Refills: 0 | Status: COMPLETED | OUTPATIENT
Start: 2017-10-28 | End: 2017-10-28

## 2017-10-28 RX ORDER — INSULIN HUMAN 100 [IU]/ML
4 INJECTION, SOLUTION SUBCUTANEOUS ONCE
Qty: 0 | Refills: 0 | Status: COMPLETED | OUTPATIENT
Start: 2017-10-28 | End: 2017-10-28

## 2017-10-28 RX ORDER — INSULIN GLARGINE 100 [IU]/ML
1 INJECTION, SOLUTION SUBCUTANEOUS
Qty: 0 | Refills: 0 | COMMUNITY

## 2017-10-28 RX ORDER — ASPIRIN/CALCIUM CARB/MAGNESIUM 324 MG
81 TABLET ORAL DAILY
Qty: 0 | Refills: 0 | Status: DISCONTINUED | OUTPATIENT
Start: 2017-10-28 | End: 2017-11-01

## 2017-10-28 RX ORDER — METOCLOPRAMIDE HCL 10 MG
10 TABLET ORAL ONCE
Qty: 0 | Refills: 0 | Status: COMPLETED | OUTPATIENT
Start: 2017-10-28 | End: 2017-10-28

## 2017-10-28 RX ORDER — TIOTROPIUM BROMIDE 18 UG/1
1 CAPSULE ORAL; RESPIRATORY (INHALATION) DAILY
Qty: 0 | Refills: 0 | Status: DISCONTINUED | OUTPATIENT
Start: 2017-10-28 | End: 2017-11-01

## 2017-10-28 RX ORDER — DEXTROSE 50 % IN WATER 50 %
1 SYRINGE (ML) INTRAVENOUS ONCE
Qty: 0 | Refills: 0 | Status: DISCONTINUED | OUTPATIENT
Start: 2017-10-28 | End: 2017-10-29

## 2017-10-28 RX ORDER — OXYCODONE HYDROCHLORIDE 5 MG/1
5 TABLET ORAL
Qty: 0 | Refills: 0 | Status: DISCONTINUED | OUTPATIENT
Start: 2017-10-28 | End: 2017-11-01

## 2017-10-28 RX ORDER — GABAPENTIN 400 MG/1
1 CAPSULE ORAL
Qty: 0 | Refills: 0 | COMMUNITY

## 2017-10-28 RX ORDER — MONTELUKAST 4 MG/1
10 TABLET, CHEWABLE ORAL DAILY
Qty: 0 | Refills: 0 | Status: DISCONTINUED | OUTPATIENT
Start: 2017-10-28 | End: 2017-11-01

## 2017-10-28 RX ADMIN — Medication 5 MILLIGRAM(S): at 18:22

## 2017-10-28 RX ADMIN — Medication 0.5 MILLIGRAM(S): at 22:26

## 2017-10-28 RX ADMIN — Medication 1 PATCH: at 12:57

## 2017-10-28 RX ADMIN — OXYCODONE HYDROCHLORIDE 5 MILLIGRAM(S): 5 TABLET ORAL at 05:32

## 2017-10-28 RX ADMIN — ONDANSETRON 4 MILLIGRAM(S): 8 TABLET, FILM COATED ORAL at 11:42

## 2017-10-28 RX ADMIN — Medication 81 MILLIGRAM(S): at 12:55

## 2017-10-28 RX ADMIN — Medication 5 MILLIGRAM(S): at 12:56

## 2017-10-28 RX ADMIN — Medication 2.5 MILLIGRAM(S): at 13:15

## 2017-10-28 RX ADMIN — Medication 2.5 MILLIGRAM(S): at 18:22

## 2017-10-28 RX ADMIN — CLOPIDOGREL BISULFATE 75 MILLIGRAM(S): 75 TABLET, FILM COATED ORAL at 11:42

## 2017-10-28 RX ADMIN — MONTELUKAST 10 MILLIGRAM(S): 4 TABLET, CHEWABLE ORAL at 12:55

## 2017-10-28 RX ADMIN — Medication 5 MILLIGRAM(S): at 23:26

## 2017-10-28 RX ADMIN — HEPARIN SODIUM 5000 UNIT(S): 5000 INJECTION INTRAVENOUS; SUBCUTANEOUS at 05:32

## 2017-10-28 RX ADMIN — PANTOPRAZOLE SODIUM 40 MILLIGRAM(S): 20 TABLET, DELAYED RELEASE ORAL at 11:41

## 2017-10-28 RX ADMIN — QUETIAPINE FUMARATE 50 MILLIGRAM(S): 200 TABLET, FILM COATED ORAL at 12:55

## 2017-10-28 RX ADMIN — Medication 20 MILLIGRAM(S): at 21:31

## 2017-10-28 RX ADMIN — HEPARIN SODIUM 5000 UNIT(S): 5000 INJECTION INTRAVENOUS; SUBCUTANEOUS at 18:22

## 2017-10-28 RX ADMIN — OXYCODONE HYDROCHLORIDE 5 MILLIGRAM(S): 5 TABLET ORAL at 13:15

## 2017-10-28 RX ADMIN — INSULIN HUMAN 4 UNIT(S): 100 INJECTION, SOLUTION SUBCUTANEOUS at 02:20

## 2017-10-28 RX ADMIN — ONDANSETRON 4 MILLIGRAM(S): 8 TABLET, FILM COATED ORAL at 04:25

## 2017-10-28 RX ADMIN — SODIUM CHLORIDE 500 MILLILITER(S): 9 INJECTION INTRAMUSCULAR; INTRAVENOUS; SUBCUTANEOUS at 01:23

## 2017-10-28 RX ADMIN — Medication 3 MILLILITER(S): at 08:36

## 2017-10-28 RX ADMIN — Medication 10 MILLIGRAM(S): at 15:24

## 2017-10-28 RX ADMIN — ATORVASTATIN CALCIUM 40 MILLIGRAM(S): 80 TABLET, FILM COATED ORAL at 21:30

## 2017-10-28 RX ADMIN — Medication 5 MILLIGRAM(S): at 12:55

## 2017-10-28 RX ADMIN — Medication 25 MILLIGRAM(S): at 05:32

## 2017-10-28 RX ADMIN — SODIUM CHLORIDE 70 MILLILITER(S): 9 INJECTION INTRAMUSCULAR; INTRAVENOUS; SUBCUTANEOUS at 13:15

## 2017-10-28 NOTE — PROGRESS NOTE ADULT - SUBJECTIVE AND OBJECTIVE BOX
pt seen and examined.  c/o cough.  states 2 pack per day smoker.  - start nicotine patch  - start nebs and pulm input appreciated will f/u cxr   - has been ruled out for TB recently   -  consult for homelessness

## 2017-10-28 NOTE — H&P ADULT - NSHPPHYSICALEXAM_GEN_ALL_CORE
ICU Vital Signs Last 24 Hrs    T(F): 98.4 (27 Oct 2017 19:48), Max: 98.4 (27 Oct 2017 19:48)    HR: 98 (27 Oct 2017 19:48) (98 - 98)    BP: 120/68 (27 Oct 2017 19:48) (120/68 - 120/68)    RR: 16    SpO2: 95%

## 2017-10-28 NOTE — H&P ADULT - ASSESSMENT
Pt is a 56 y/o woman from the [facility] with pmhx COPD/emphysema, chronic cough,  lung cancer, IDDM, MI, CABG,  presenting to the  ED with  SOB, cough and CP for 3 days.   She reports chest pain is dull, sharp and assoc with numbness of the left chest and shoulder.     She c/o nausea, vomiting and weakness, for which she was unable to take her insulin for 3 days.   She smokes 1 PPD.    She is planned for cataract surgery by Dr. Beckford on 11/13/2017.    In the ER, she received Duonebs, IVF.      Pt is admitted w/    I. SOB, chest pain  DDX COPD exac, ACS  - nebs, O2  - cont ASA, statin  - serial troponins and EKGs  - telemetry  - cardiology consult Dr. Felipe      II.  DMII  - Insulin sliding scale and Lantus    III. Nausea/vomiting with hx of GERD/hiatal hernia/gastroparesis   - zofran  - cont protonix    IV.  Homelessness, Meds, Smoking cessation  - nicotine patch  - social work consult  - pt unsure of all meds,  she has some in a list, will need to confirm in AM    V. DVT prophylaxis  - heparin  Pt is admitted w/    I. ACS, chest pain, pos troponin  - ASA 325mg given in ER, cont ASA, give statin  - serial troponins and EKGs  - telemetry  - cardiology consult  - NPO after midnight for possible stress test    II.  DMII  - Insulin sliding scale    III. GERD  - cont protonix, pepcid    IV.  Smoking cessation,    - nicotine patch  - social work consult      V. DVT prophylaxis  - heparin

## 2017-10-28 NOTE — ED ADULT NURSE REASSESSMENT NOTE - NS ED NURSE REASSESS COMMENT FT1
0000- Patient received from MARYANN Lin. Patient A&Ox3. VSS. no complaints at this time. Will continue to monitor.   0200- Patient resting comfortably in bed. Patient assisted to bathroom without difficulty. Emotional support provided. Will continue to monitor.

## 2017-10-28 NOTE — CONSULT NOTE ADULT - SUBJECTIVE AND OBJECTIVE BOX
HPI:  Pt is a 56 y/o woman with pmhx COPD/emphysema, chronic cough,  lung cancer, IDDM, MI, CABG,  presenting to the  ED with  SOB, cough and CP for 3 days.   She reports chest pain is dull, sharp and assoc with numbness of the left chest and shoulder.     She c/o nausea, vomiting and weakness, for which she was unable to take her insulin for 3 days.   She smokes 1 PPD. Still has some sob and cough. Denies hemoptysis.           Past Med/Surg Hx   COPD,   Emphysema,   Lung cancer,   DM,   CABG three vessel, MI, non-obstr disease on cath in 8/2017  Gastroparesis and hiatal hernia on endoscopy 8/2017    Fam Hx:  non-contrib to current adm (28 Oct 2017 02:59)      PAST MEDICAL & SURGICAL HISTORY:  Diabetes  Lung cancer  COPD (chronic obstructive pulmonary disease)  No significant past surgical history      MEDICATIONS  (STANDING):  aspirin enteric coated 81 milliGRAM(s) Oral daily  atorvastatin 40 milliGRAM(s) Oral at bedtime  buPROPion XL . 300 milliGRAM(s) Oral daily  clopidogrel Tablet 75 milliGRAM(s) Oral daily  dextrose 5%. 1000 milliLiter(s) (50 mL/Hr) IV Continuous <Continuous>  dextrose 50% Injectable 12.5 Gram(s) IV Push once  dextrose 50% Injectable 25 Gram(s) IV Push once  dextrose 50% Injectable 25 Gram(s) IV Push once  enalapril 2.5 milliGRAM(s) Oral every 12 hours  heparin  Injectable 5000 Unit(s) SubCutaneous every 12 hours  insulin glargine Injectable (LANTUS) 60 Unit(s) SubCutaneous two times a day  insulin lispro (HumaLOG) corrective regimen sliding scale   SubCutaneous three times a day before meals  insulin lispro (HumaLOG) corrective regimen sliding scale   SubCutaneous at bedtime  metoprolol succinate ER 25 milliGRAM(s) Oral daily  montelukast 10 milliGRAM(s) Oral daily  nicotine -  14 mG/24Hr(s) Patch 1 patch Transdermal daily  oxybutynin 5 milliGRAM(s) Oral four times a day  pantoprazole    Tablet 40 milliGRAM(s) Oral before breakfast  QUEtiapine 50 milliGRAM(s) Oral daily  sodium chloride 0.9%. 1000 milliLiter(s) (70 mL/Hr) IV Continuous <Continuous>  tiotropium 18 MICROgram(s) Capsule 1 Capsule(s) Inhalation daily    MEDICATIONS  (PRN):  acetaminophen   Tablet. 650 milliGRAM(s) Oral every 6 hours PRN Mild Pain (1 - 3)  ALBUTerol    90 MICROgram(s) HFA Inhaler 2 Puff(s) Inhalation every 6 hours PRN Shortness of Breath and/or Wheezing  dextrose Gel 1 Dose(s) Oral once PRN Blood Glucose LESS THAN 70 milliGRAM(s)/deciLiter  glucagon  Injectable 1 milliGRAM(s) IntraMuscular once PRN Glucose <70 milliGRAM(s)/deciLiter  ondansetron Injectable 4 milliGRAM(s) IV Push every 6 hours PRN Nausea  oxyCODONE    IR 5 milliGRAM(s) Oral four times a day PRN Moderate Pain (4 - 6)      Allergies    Dilaudid (Unknown)    Intolerances        SOCIAL HISTORY: Denies tobacco, etoh abuse or illicit drug use    FAMILY HISTORY:  No pertinent family history in first degree relatives      Vital Signs Last 24 Hrs  T(C): 36.5 (28 Oct 2017 10:24), Max: 37.5 (28 Oct 2017 05:11)  T(F): 97.7 (28 Oct 2017 10:24), Max: 99.5 (28 Oct 2017 05:11)  HR: 80 (28 Oct 2017 10:24) (76 - 98)  BP: 119/59 (28 Oct 2017 10:24) (111/60 - 125/56)  BP(mean): --  RR: 18 (28 Oct 2017 10:24) (16 - 20)  SpO2: 98% (28 Oct 2017 10:24) (95% - 98%)    REVIEW OF SYSTEMS:    CONSTITUTIONAL:  As per HPI.  SKIN: no rashes  HEENT:  Eyes:  No diplopia or blurred vision. ENT:  No earache, sore throat or runny nose.  CARDIOVASCULAR:  No pressure, squeezing, tightness, heaviness or aching about the chest, neck, axilla or epigastrium.  RESPIRATORY:  No cough, shortness of breath, PND or orthopnea.  GASTROINTESTINAL:  No nausea, vomiting or diarrhea.  GENITOURINARY:  No dysuria, frequency or urgency.  MUSCULOSKELETAL:  As per HPI.  SKIN:  No change in skin, hair or nails.  NEUROLOGIC:  No paresthesias, fasciculations, seizures or weakness.  PSYCHIATRIC:  No disorder of thought or mood.  ENDOCRINE:  No heat or cold intolerance, polyuria or polydipsia.  HEMATOLOGICAL:  No easy bruising or bleedings:  .     PHYSICAL EXAMINATION:    GENERAL APPEARANCE:  Pt. is not currently dyspneic, in no distress. Pt. is alert, oriented, and pleasant.  HEENT:  Pupils are normal and react normally. No icterus. Mucous membranes well colored.  NECK:  Supple. No lymphadenopathy. Jugular venous pressure not elevated. Carotids equal.   HEART:   The cardiac impulse has a normal quality. Regular. Normal S1 and S2. There are no murmurs, rubs or gallops noted  CHEST:  bilateral ronchi  ABDOMEN:  Soft and nontender.   EXTREMITIES:  There is no cyanosis, clubbing or edema.   SKIN:  No rash or significant lesions are noted.  CNS: AAO x 3    LABS:                        11.7   15.7  )-----------( 279      ( 27 Oct 2017 20:18 )             36.1     10-28    141  |  109<H>  |  19  ----------------------------<  77  3.9   |  28  |  0.90    Ca    8.4<L>      28 Oct 2017 06:50  Mg     1.6     10-27    TPro  6.8  /  Alb  3.0<L>  /  TBili  0.5  /  DBili  x   /  AST  13<L>  /  ALT  15  /  AlkPhos  77  10-27    LIVER FUNCTIONS - ( 27 Oct 2017 20:18 )  Alb: 3.0 g/dL / Pro: 6.8 gm/dL / ALK PHOS: 77 U/L / ALT: 15 U/L / AST: 13 U/L / GGT: x           PT/INR - ( 27 Oct 2017 20:18 )   PT: 12.3 sec;   INR: 1.14 ratio         PTT - ( 27 Oct 2017 20:18 )  PTT:29.0 sec  CARDIAC MARKERS ( 28 Oct 2017 01:53 )  0.019 ng/mL / x     / x     / x     / x      CARDIAC MARKERS ( 28 Oct 2017 00:01 )  0.018 ng/mL / x     / x     / x     / x      CARDIAC MARKERS ( 27 Oct 2017 20:18 )  <0.015 ng/mL / x     / x     / x     / x                RADIOLOGY & ADDITIONAL STUDIES:

## 2017-10-28 NOTE — H&P ADULT - NSHPSOCIALHISTORY_GEN_ALL_CORE
Pt lives at [facility].  > 50 pack years tobacco use.   Pt smokes 1 PPD.   She denies ETOH/drug use.

## 2017-10-28 NOTE — H&P ADULT - HISTORY OF PRESENT ILLNESS
Pt is a 56 y/o woman with pmhx COPD/emphysema, chronic cough,  lung cancer, DM, MI, CABG,  presenting to the  ED due to SOB and CP since yesterday.   She reports chest pain is non radiating and 9/10.   She states it  feels like "someone is sitting on her chest".  Pt reports resting and lying still, helps the pain.   She c/o nausea and  mild abd pain for which she reports pepcid usually helps.    In the ER, she received BRIGHT Conrad Pt is a 58 y/o woman with pmhx COPD/emphysema, chronic cough,  lung cancer, DM, MI, CABG,  presenting to the  ED with  SOB, cough and CP for 3 days.   She reports chest pain is dull, sharp and assoc with numbness of the left chest and shoulder.     She c/o nausea, vomiting and weakness, for which she was unable to take her insulin for 3 days.   She smokes 1 PPD.    She is planned for cataract surgery by Dr. Beckford on 11/13/2017.    In the ER, she received Dufarzad,       Past Med/Surg Hx   COPD,   Emphysema,   Lung cancer,   DM,   CABG three vessel, MI    Fam Hx:  non-contrib to current adm Pt is a 56 y/o woman with pmhx COPD/emphysema, chronic cough,  lung cancer, IDDM, MI, CABG,  presenting to the  ED with  SOB, cough and CP for 3 days.   She reports chest pain is dull, sharp and assoc with numbness of the left chest and shoulder.     She c/o nausea, vomiting and weakness, for which she was unable to take her insulin for 3 days.   She smokes 1 PPD.    She is planned for cataract surgery by Dr. Beckford on 11/13/2017.    In the ER, she received Dufarzad,       Past Med/Surg Hx   COPD,   Emphysema,   Lung cancer,   DM,   CABG three vessel, MI    Fam Hx:  non-contrib to current adm Pt is a 56 y/o woman with pmhx COPD/emphysema, chronic cough,  lung cancer, IDDM, MI, CABG,  presenting to the  ED with  SOB, cough and CP for 3 days.   She reports chest pain is dull, sharp and assoc with numbness of the left chest and shoulder.     She c/o nausea, vomiting and weakness, for which she was unable to take her insulin for 3 days.   She smokes 1 PPD.    She is planned for cataract surgery by Dr. Beckford on 11/13/2017.    In the ER, she received Duonebs, IVF.        Past Med/Surg Hx   COPD,   Emphysema,   Lung cancer,   DM,   CABG three vessel, MI, non-obstr disease on cath in 8/2017  Gastroparesis and hiatal hernia on endoscopy 8/2017    Fam Hx:  non-contrib to current adm

## 2017-10-29 LAB
ALBUMIN SERPL ELPH-MCNC: 2.5 G/DL — LOW (ref 3.3–5)
ALP SERPL-CCNC: 128 U/L — HIGH (ref 40–120)
ALT FLD-CCNC: 17 U/L — SIGNIFICANT CHANGE UP (ref 12–78)
ANION GAP SERPL CALC-SCNC: 6 MMOL/L — SIGNIFICANT CHANGE UP (ref 5–17)
AST SERPL-CCNC: 14 U/L — LOW (ref 15–37)
BILIRUB SERPL-MCNC: 0.3 MG/DL — SIGNIFICANT CHANGE UP (ref 0.2–1.2)
BUN SERPL-MCNC: 32 MG/DL — HIGH (ref 7–23)
CALCIUM SERPL-MCNC: 8.4 MG/DL — LOW (ref 8.5–10.1)
CHLORIDE SERPL-SCNC: 106 MMOL/L — SIGNIFICANT CHANGE UP (ref 96–108)
CO2 SERPL-SCNC: 25 MMOL/L — SIGNIFICANT CHANGE UP (ref 22–31)
CREAT SERPL-MCNC: 1.26 MG/DL — SIGNIFICANT CHANGE UP (ref 0.5–1.3)
GLUCOSE BLDC GLUCOMTR-MCNC: 299 MG/DL — HIGH (ref 70–99)
GLUCOSE BLDC GLUCOMTR-MCNC: 354 MG/DL — HIGH (ref 70–99)
GLUCOSE BLDC GLUCOMTR-MCNC: 474 MG/DL — CRITICAL HIGH (ref 70–99)
GLUCOSE BLDC GLUCOMTR-MCNC: 523 MG/DL — CRITICAL HIGH (ref 70–99)
GLUCOSE SERPL-MCNC: 275 MG/DL — HIGH (ref 70–99)
HCT VFR BLD CALC: 33.3 % — LOW (ref 34.5–45)
HGB BLD-MCNC: 10.5 G/DL — LOW (ref 11.5–15.5)
MCHC RBC-ENTMCNC: 27.1 PG — SIGNIFICANT CHANGE UP (ref 27–34)
MCHC RBC-ENTMCNC: 31.6 GM/DL — LOW (ref 32–36)
MCV RBC AUTO: 85.8 FL — SIGNIFICANT CHANGE UP (ref 80–100)
PLATELET # BLD AUTO: 259 K/UL — SIGNIFICANT CHANGE UP (ref 150–400)
POTASSIUM SERPL-MCNC: 5.8 MMOL/L — HIGH (ref 3.5–5.3)
POTASSIUM SERPL-SCNC: 5.8 MMOL/L — HIGH (ref 3.5–5.3)
PROT SERPL-MCNC: 6 GM/DL — SIGNIFICANT CHANGE UP (ref 6–8.3)
RBC # BLD: 3.88 M/UL — SIGNIFICANT CHANGE UP (ref 3.8–5.2)
RBC # FLD: 13.6 % — SIGNIFICANT CHANGE UP (ref 10.3–14.5)
SODIUM SERPL-SCNC: 137 MMOL/L — SIGNIFICANT CHANGE UP (ref 135–145)
WBC # BLD: 8.3 K/UL — SIGNIFICANT CHANGE UP (ref 3.8–10.5)
WBC # FLD AUTO: 8.3 K/UL — SIGNIFICANT CHANGE UP (ref 3.8–10.5)

## 2017-10-29 PROCEDURE — 71010: CPT | Mod: 26

## 2017-10-29 PROCEDURE — 93010 ELECTROCARDIOGRAM REPORT: CPT

## 2017-10-29 RX ORDER — METOCLOPRAMIDE HCL 10 MG
10 TABLET ORAL ONCE
Qty: 0 | Refills: 0 | Status: COMPLETED | OUTPATIENT
Start: 2017-10-29 | End: 2017-10-29

## 2017-10-29 RX ORDER — DEXTROSE 50 % IN WATER 50 %
25 SYRINGE (ML) INTRAVENOUS ONCE
Qty: 0 | Refills: 0 | Status: DISCONTINUED | OUTPATIENT
Start: 2017-10-29 | End: 2017-11-01

## 2017-10-29 RX ORDER — INSULIN GLARGINE 100 [IU]/ML
30 INJECTION, SOLUTION SUBCUTANEOUS AT BEDTIME
Qty: 0 | Refills: 0 | Status: DISCONTINUED | OUTPATIENT
Start: 2017-10-29 | End: 2017-10-30

## 2017-10-29 RX ORDER — INSULIN LISPRO 100/ML
VIAL (ML) SUBCUTANEOUS
Qty: 0 | Refills: 0 | Status: DISCONTINUED | OUTPATIENT
Start: 2017-10-29 | End: 2017-11-01

## 2017-10-29 RX ORDER — GLUCAGON INJECTION, SOLUTION 0.5 MG/.1ML
1 INJECTION, SOLUTION SUBCUTANEOUS ONCE
Qty: 0 | Refills: 0 | Status: DISCONTINUED | OUTPATIENT
Start: 2017-10-29 | End: 2017-11-01

## 2017-10-29 RX ORDER — INSULIN LISPRO 100/ML
VIAL (ML) SUBCUTANEOUS AT BEDTIME
Qty: 0 | Refills: 0 | Status: DISCONTINUED | OUTPATIENT
Start: 2017-10-29 | End: 2017-11-01

## 2017-10-29 RX ORDER — DEXTROSE 50 % IN WATER 50 %
1 SYRINGE (ML) INTRAVENOUS ONCE
Qty: 0 | Refills: 0 | Status: DISCONTINUED | OUTPATIENT
Start: 2017-10-29 | End: 2017-11-01

## 2017-10-29 RX ORDER — DEXTROSE 50 % IN WATER 50 %
12.5 SYRINGE (ML) INTRAVENOUS ONCE
Qty: 0 | Refills: 0 | Status: DISCONTINUED | OUTPATIENT
Start: 2017-10-29 | End: 2017-11-01

## 2017-10-29 RX ORDER — INSULIN GLARGINE 100 [IU]/ML
25 INJECTION, SOLUTION SUBCUTANEOUS EVERY MORNING
Qty: 0 | Refills: 0 | Status: DISCONTINUED | OUTPATIENT
Start: 2017-10-30 | End: 2017-10-30

## 2017-10-29 RX ADMIN — Medication 3: at 08:49

## 2017-10-29 RX ADMIN — Medication 10 MILLIGRAM(S): at 01:53

## 2017-10-29 RX ADMIN — ATORVASTATIN CALCIUM 40 MILLIGRAM(S): 80 TABLET, FILM COATED ORAL at 22:38

## 2017-10-29 RX ADMIN — Medication 12: at 17:32

## 2017-10-29 RX ADMIN — Medication 81 MILLIGRAM(S): at 11:21

## 2017-10-29 RX ADMIN — Medication 25 MILLIGRAM(S): at 05:56

## 2017-10-29 RX ADMIN — INSULIN GLARGINE 30 UNIT(S): 100 INJECTION, SOLUTION SUBCUTANEOUS at 23:10

## 2017-10-29 RX ADMIN — TIOTROPIUM BROMIDE 1 CAPSULE(S): 18 CAPSULE ORAL; RESPIRATORY (INHALATION) at 08:41

## 2017-10-29 RX ADMIN — HEPARIN SODIUM 5000 UNIT(S): 5000 INJECTION INTRAVENOUS; SUBCUTANEOUS at 17:32

## 2017-10-29 RX ADMIN — Medication 20 MILLIGRAM(S): at 17:32

## 2017-10-29 RX ADMIN — Medication 0.5 MILLIGRAM(S): at 20:24

## 2017-10-29 RX ADMIN — Medication 20 MILLIGRAM(S): at 05:56

## 2017-10-29 RX ADMIN — Medication 1 PATCH: at 11:22

## 2017-10-29 RX ADMIN — CLOPIDOGREL BISULFATE 75 MILLIGRAM(S): 75 TABLET, FILM COATED ORAL at 11:21

## 2017-10-29 RX ADMIN — OXYCODONE HYDROCHLORIDE 5 MILLIGRAM(S): 5 TABLET ORAL at 09:50

## 2017-10-29 RX ADMIN — PANTOPRAZOLE SODIUM 40 MILLIGRAM(S): 20 TABLET, DELAYED RELEASE ORAL at 05:57

## 2017-10-29 RX ADMIN — Medication 6: at 23:11

## 2017-10-29 RX ADMIN — Medication 2.5 MILLIGRAM(S): at 05:56

## 2017-10-29 RX ADMIN — OXYCODONE HYDROCHLORIDE 5 MILLIGRAM(S): 5 TABLET ORAL at 08:48

## 2017-10-29 RX ADMIN — Medication 5 MILLIGRAM(S): at 17:32

## 2017-10-29 RX ADMIN — QUETIAPINE FUMARATE 50 MILLIGRAM(S): 200 TABLET, FILM COATED ORAL at 11:21

## 2017-10-29 RX ADMIN — ALBUTEROL 2 PUFF(S): 90 AEROSOL, METERED ORAL at 01:54

## 2017-10-29 RX ADMIN — Medication: at 12:00

## 2017-10-29 RX ADMIN — Medication 5 MILLIGRAM(S): at 05:57

## 2017-10-29 RX ADMIN — Medication 5 MILLIGRAM(S): at 23:13

## 2017-10-29 RX ADMIN — Medication 0.5 MILLIGRAM(S): at 08:41

## 2017-10-29 RX ADMIN — Medication 2.5 MILLIGRAM(S): at 17:32

## 2017-10-29 RX ADMIN — HEPARIN SODIUM 5000 UNIT(S): 5000 INJECTION INTRAVENOUS; SUBCUTANEOUS at 05:57

## 2017-10-29 RX ADMIN — BUPROPION HYDROCHLORIDE 300 MILLIGRAM(S): 150 TABLET, EXTENDED RELEASE ORAL at 11:21

## 2017-10-29 RX ADMIN — Medication 5 MILLIGRAM(S): at 11:21

## 2017-10-29 RX ADMIN — MONTELUKAST 10 MILLIGRAM(S): 4 TABLET, CHEWABLE ORAL at 16:19

## 2017-10-29 RX ADMIN — SODIUM CHLORIDE 70 MILLILITER(S): 9 INJECTION INTRAMUSCULAR; INTRAVENOUS; SUBCUTANEOUS at 23:15

## 2017-10-29 NOTE — PROGRESS NOTE ADULT - SUBJECTIVE AND OBJECTIVE BOX
Subjective:    pat better, sitting in bed, no respiratory distress.    Home Medications:  acetaminophen 325 mg oral tablet: 2 tab(s) orally every 6 hours, As needed, Mild Pain (1 - 3) (02 Sep 2017 19:17)  albuterol 90 mcg/inh inhalation aerosol: 2 puff(s) inhaled every 6 hours, As needed, Shortness of Breath and/or Wheezing (02 Sep 2017 19:17)  albuterol-ipratropium 2.5 mg-0.5 mg/3 mL inhalation solution: 3 milliliter(s) inhaled every 6 hours, As Needed wheezing/shortness of breath (02 Sep 2017 19:17)  aspirin 81 mg oral delayed release tablet: 1 tab(s) orally once a day (02 Sep 2017 19:17)  atorvastatin 40 mg oral tablet: 1 tab(s) orally once a day (at bedtime) (02 Sep 2017 19:17)  buPROPion 150 mg/12 hours (SR) oral tablet, extended release: 1 tab(s) orally 2 times a day (28 Oct 2017 04:33)  insulin lispro 100 units/mL subcutaneous solution: 1 Unit(s) if Glucose 151 - 200  2 Unit(s) if Glucose 201 - 250  3 Unit(s) if Glucose 251 - 300  4 Unit(s) if Glucose 301 - 350  5 Unit(s) if Glucose 351 - 400  6 Unit(s) if Glucose GREATER THAN 400 (02 Sep 2017 19:17)  Lantus 100 units/mL subcutaneous solution: 1 dose(s) subcutaneous 2 times a day (28 Oct 2017 04:23)  metoprolol succinate 25 mg oral tablet, extended release: 1 tab(s) orally once a day (28 Oct 2017 04:33)  oxybutynin 5 mg oral tablet: 1 tab(s) orally 4 times a day (02 Sep 2017 19:17)  oxyCODONE 5 mg oral capsule: 1 cap(s) orally every 6 hours (28 Oct 2017 04:35)  pantoprazole 40 mg oral delayed release tablet: 1 tab(s) orally once a day (before a meal) (02 Sep 2017 19:17)  Plavix 75 mg oral tablet: 1 tab(s) orally once a day (28 Oct 2017 04:33)  QUEtiapine 50 mg oral tablet, extended release: 1 tab(s) orally once a day (in the evening) (28 Oct 2017 04:32)  Singulair 10 mg oral tablet: 1 tab(s) orally once a day (28 Oct 2017 04:32)    MEDICATIONS  (STANDING):  aspirin enteric coated 81 milliGRAM(s) Oral daily  atorvastatin 40 milliGRAM(s) Oral at bedtime  buDESOnide   0.5 milliGRAM(s) Respule 0.5 milliGRAM(s) Inhalation every 12 hours  buPROPion XL . 300 milliGRAM(s) Oral daily  clopidogrel Tablet 75 milliGRAM(s) Oral daily  dextrose 5%. 1000 milliLiter(s) (50 mL/Hr) IV Continuous <Continuous>  dextrose 50% Injectable 12.5 Gram(s) IV Push once  dextrose 50% Injectable 25 Gram(s) IV Push once  dextrose 50% Injectable 25 Gram(s) IV Push once  enalapril 2.5 milliGRAM(s) Oral every 12 hours  heparin  Injectable 5000 Unit(s) SubCutaneous every 12 hours  insulin glargine Injectable (LANTUS) 60 Unit(s) SubCutaneous two times a day  insulin lispro (HumaLOG) corrective regimen sliding scale   SubCutaneous three times a day before meals  insulin lispro (HumaLOG) corrective regimen sliding scale   SubCutaneous at bedtime  metoprolol succinate ER 25 milliGRAM(s) Oral daily  montelukast 10 milliGRAM(s) Oral daily  nicotine - 21 mG/24Hr(s) Patch 1 patch Transdermal daily  oxybutynin 5 milliGRAM(s) Oral four times a day  pantoprazole    Tablet 40 milliGRAM(s) Oral before breakfast  predniSONE   Tablet 20 milliGRAM(s) Oral two times a day  QUEtiapine 50 milliGRAM(s) Oral daily  sodium chloride 0.9%. 1000 milliLiter(s) (70 mL/Hr) IV Continuous <Continuous>  tiotropium 18 MICROgram(s) Capsule 1 Capsule(s) Inhalation daily    MEDICATIONS  (PRN):  acetaminophen   Tablet. 650 milliGRAM(s) Oral every 6 hours PRN Mild Pain (1 - 3)  ALBUTerol    90 MICROgram(s) HFA Inhaler 2 Puff(s) Inhalation every 6 hours PRN Shortness of Breath and/or Wheezing  dextrose Gel 1 Dose(s) Oral once PRN Blood Glucose LESS THAN 70 milliGRAM(s)/deciLiter  glucagon  Injectable 1 milliGRAM(s) IntraMuscular once PRN Glucose <70 milliGRAM(s)/deciLiter  ondansetron Injectable 4 milliGRAM(s) IV Push every 6 hours PRN Nausea  oxyCODONE    IR 5 milliGRAM(s) Oral four times a day PRN Moderate Pain (4 - 6)      Allergies    Dilaudid (Unknown)    Intolerances        Vital Signs Last 24 Hrs  T(C): 36.5 (29 Oct 2017 09:51), Max: 37.4 (28 Oct 2017 21:35)  T(F): 97.7 (29 Oct 2017 09:51), Max: 99.3 (28 Oct 2017 21:35)  HR: 72 (29 Oct 2017 09:51) (67 - 77)  BP: 120/49 (29 Oct 2017 09:51) (103/51 - 133/63)  BP(mean): --  RR: 18 (29 Oct 2017 09:51) (18 - 20)  SpO2: 97% (29 Oct 2017 09:51) (94% - 97%)      PHYSICAL EXAMINATION:    NECK:  Supple. No lymphadenopathy. Jugular venous pressure not elevated. Carotids equal.   HEART:   The cardiac impulse has a normal quality. Reg., Nl S1 and S2.  There are no murmurs, rubs or gallops noted  CHEST:  Chest rhonchi to auscultation. Normal respiratory effort.  ABDOMEN:  Soft and nontender.   EXTREMITIES:  There is no edema.       LABS:                        10.5   8.3   )-----------( 259      ( 29 Oct 2017 06:43 )             33.3     10-29    137  |  106  |  32<H>  ----------------------------<  275<H>  5.8<H>   |  25  |  1.26    Ca    8.4<L>      29 Oct 2017 06:43  Mg     1.6     10-27    TPro  6.0  /  Alb  2.5<L>  /  TBili  0.3  /  DBili  x   /  AST  14<L>  /  ALT  17  /  AlkPhos  128<H>  10-29    PT/INR - ( 27 Oct 2017 20:18 )   PT: 12.3 sec;   INR: 1.14 ratio         PTT - ( 27 Oct 2017 20:18 )  PTT:29.0 sec

## 2017-10-29 NOTE — PROGRESS NOTE ADULT - ASSESSMENT
Pt is admitted w/    # SOB, chest pain  DDX COPD exac, ACS  - nebs, O2 contd.  - cont ASA, statin  - serial troponins and EKGs  - telemetry  - cardiology consult Dr. Felipe      #  DMII  - Continue lantus and insulin sliding scale        #Nausea/vomiting with hx of GERD/hiatal hernia/gastroparesis   - zofran not working  - cont protonix    IV.  Homelessness, Meds, Smoking cessation  - nicotine patch  - social work consult  - pt unsure of all meds,  she has some in a list, will need to confirm in AM    V. DVT prophylaxis  - heparin  Pt is admitted w/    I. ACS, chest pain, pos troponin  - ASA 325mg given in ER, cont ASA, give statin  - serial troponins and EKGs  - telemetry  - cardiology consult  - NPO after midnight for possible stress test    II.  DMII  - Insulin sliding scale    III. GERD  - cont protonix, pepcid    IV.  Smoking cessation,    - nicotine patch  - social work consult      V. DVT prophylaxis  - heparin Pt is admitted w/    # SOB, chest pain  DDX COPD exac, ACS  - nebs, O2 contd.  - cont ASA, statin  - serial troponins and EKGs  - telemetry  - cardiology consult appreciated  - ok to d/c tele as per cardio   - pulm note appreciated     #  DMII  - Continue lantus and insulin sliding scale    #Nausea/vomiting with hx of GERD/hiatal hernia/gastroparesis   - zofran not working  - cont protonix    # Homelessness, Meds, Smoking cessation  - nicotine patch  - social work consult  - pt unsure of all meds,  she has some in a list, will need to confirm in AM    # DVT prophylaxis  - heparin    dispo - likely shelter in AM   Pt is admitted w/    I. ACS, chest pain, pos troponin  - ASA 325mg given in ER, cont ASA, give statin  - serial troponins and EKGs  - telemetry  - cardiology consult  - NPO after midnight for possible stress test    II.  DMII  - Insulin sliding scale    III. GERD  - cont protonix, pepcid    IV.  Smoking cessation,    - nicotine patch  - social work consult      V. DVT prophylaxis  - heparin

## 2017-10-29 NOTE — PROGRESS NOTE ADULT - SUBJECTIVE AND OBJECTIVE BOX
Pt is a 58 y/o woman from the [facility] shelter with pmhx COPD/emphysema, chronic cough,  lung cancer, IDDM, MI, CABG,  presenting to the  ED with  SOB, cough and CP for 3 days.   She reports chest pain is dull, sharp and assoc with numbness of the left chest and shoulder.     She c/o nausea, vomiting and weakness, for which she was unable to take her insulin for 3 days.   She smokes 1 PPD.    She is planned for cataract surgery by Dr. Beckford on 11/13/2017.    In the ER, she received Dufarzad, IVF.   10/29 - Patient states cough hard to control, but has had more ease of breathing, and reduced chest pain      Review of Systems:  Other Review of Systems: All other review of systems negative, except as noted in HPI	    Past Med/Surg Hx   COPD,   Emphysema,   Lung cancer,   DM,   CABG three vessel, MI, non-obstr disease on cath in 8/2017  Gastroparesis and hiatal hernia on endoscopy 8/2017  Social History: Pt lives at [facility].  > 50 pack years tobacco use.   Pt smokes 1 PPD.   She denies ETOH/drug use.    Fam Hx:  non-contrib to current adm   Pt is a 58 y/o woman with pmhx COPD/emphysema, chronic cough,  lung cancer, IDDM, MI, CABG,  presenting to the  ED with  SOB, cough and CP for 3 days.   She reports chest pain is dull, sharp and assoc with numbness of the left chest and shoulder.     She c/o nausea, vomiting and weakness, for which she was unable to take her insulin for 3 days.   She smokes 1 PPD.    She is planned for cataract surgery by Dr. Beckford on 11/13/2017.    In the ER, she received Duonebs,       Past Med/Surg Hx   COPD,   Emphysema,   Lung cancer,   DM,   CABG three vessel, MI    Fam Hx:  non-contrib to current adm       Allergies and Intolerances:        Allergies:  	Dilaudid: Drug, Unknown    Home Medications:   * Patient Currently Takes Medications as of 28-Oct-2017 04:32 documented in Structured Notes  · 	predniSONE 10 mg oral tablet: 3 tab(s) orally once a day x 2 days  	2 tab(s) orally once a day x 2 days  	1 tab(s) orally once a day x 2 days  · 	tiotropium 18 mcg inhalation capsule: 1 cap(s) inhaled once a day  · 	albuterol-ipratropium 2.5 mg-0.5 mg/3 mL inhalation solution: 3 milliliter(s) inhaled every 6 hours, As Needed wheezing/shortness of breath  · 	pantoprazole 40 mg oral delayed release tablet: 1 tab(s) orally once a day (before a meal)  · 	albuterol 90 mcg/inh inhalation aerosol: 2 puff(s) inhaled every 6 hours, As needed, Shortness of Breath and/or Wheezing  · 	acetaminophen 325 mg oral tablet: 2 tab(s) orally every 6 hours, As needed, Mild Pain (1 - 3)  · 	aspirin 81 mg oral delayed release tablet: 1 tab(s) orally once a day  · 	atorvastatin 40 mg oral tablet: 1 tab(s) orally once a day (at bedtime)  · 	oxybutynin 5 mg oral tablet: 1 tab(s) orally 4 times a day  · 	insulin lispro 100 units/mL subcutaneous solution: 1 Unit(s) if Glucose 151 - 200  	2 Unit(s) if Glucose 201 - 250  	3 Unit(s) if Glucose 251 - 300  	4 Unit(s) if Glucose 301 - 350  	5 Unit(s) if Glucose 351 - 400  	6 Unit(s) if Glucose GREATER THAN 400  · 	Lantus 100 units/mL subcutaneous solution: 1 dose(s) subcutaneous 2 times a day  · 	Singulair 10 mg oral tablet: 1 tab(s) orally once a day  · 	QUEtiapine 50 mg oral tablet, extended release: 1 tab(s) orally once a day (in the evening)  · 	buPROPion 150 mg/12 hours (SR) oral tablet, extended release: 1 tab(s) orally 2 times a day  · 	metoprolol succinate 25 mg oral tablet, extended release: 1 tab(s) orally once a day  · 	Plavix 75 mg oral tablet: 1 tab(s) orally once a day  · 	oxyCODONE 5 mg oral capsule: 1 cap(s) orally every 6 hours   Physical Exam:  Physical Exam: ICU Vital Signs Last 24 Hrs   T(F): 98.4 (27 Oct 2017 19:48), Max: 98.4 (27 Oct 2017 19:48)   HR: 98 (27 Oct 2017 19:48) (98 - 98)   BP: 120/68 (27 Oct 2017 19:48) (120/68 - 120/68)   RR: 16    SpO2: 95%	     Physical Exam:  Constitutional: A &O *3. WD/WN in NAD  HEENT: PERRL; EOMI, conjunctiva clear. No nasal discharge. No lesions in buccal cavity noted  Pharynx and Neck: Supple, no JVD, no palpable nodes. No redness of pharynx  Respiratory:  Scattered wheezes, more prominent in lower lobes.  Extremities: No cyanosis, clubbing or edema. Pedal pulses 2+  Skin: no rash or erythema noted. Warm  Neurologic: CN I, III, IV, V, VI, VI intact.  Musculoskeletal. No calf tenderness                        10.5   8.3   )-----------( 259      ( 29 Oct 2017 06:43 )             33.3   10-29      CARDIAC MARKERS ( 28 Oct 2017 01:53 )  0.019 ng/mL / x     / x     / x     / x      CARDIAC MARKERS ( 28 Oct 2017 00:01 )  0.018 ng/mL / x     / x     / x     / x      CARDIAC MARKERS ( 27 Oct 2017 20:18 )  <0.015 ng/mL / x     / x     / x     / x      10-29    137  |  106  |  32<H>  ----------------------------<  275<H>  5.8<H>   |  25  |  1.26    Ca    8.4<L>      29 Oct 2017 06:43  Mg     1.6     10-27    TPro  6.0  /  Alb  2.5<L>  /  TBili  0.3  /  DBili  x   /  AST  14<L>  /  ALT  17  /  AlkPhos  128<H>  10-29      LIVER FUNCTIONS - ( 29 Oct 2017 06:43 )  Alb: 2.5 g/dL / Pro: 6.0 gm/dL / ALK PHOS: 128 U/L / ALT: 17 U/L / AST: 14 U/L / GGT: x           PT/INR - ( 27 Oct 2017 20:18 )   PT: 12.3 sec;   INR: 1.14 ratio         PTT - ( 27 Oct 2017 20:18 )  PTT:29.0 sec Pt is a 58 y/o woman from the [facility] shelter with pmhx COPD/emphysema, chronic cough,  lung cancer, IDDM, MI, CABG,  presenting to the  ED with  SOB, cough and CP for 3 days.   She reports chest pain is dull, sharp and assoc with numbness of the left chest and shoulder.     She c/o nausea, vomiting and weakness, for which she was unable to take her insulin for 3 days.   She smokes 1 PPD.    She is planned for cataract surgery by Dr. Beckford on 11/13/2017.    In the ER, she received Duonebs, IVF.   10/29 - Patient states cough hard to control, but has had more ease of breathing, and reduced chest pain      Review of Systems:  Other Review of Systems: All other review of systems negative, except as noted in HPI	   Physical Exam:  Physical Exam: ICU Vital Signs Last 24 Hrs   T(F): 98.4 (27 Oct 2017 19:48), Max: 98.4 (27 Oct 2017 19:48)   HR: 98 (27 Oct 2017 19:48) (98 - 98)   BP: 120/68 (27 Oct 2017 19:48) (120/68 - 120/68)   RR: 16    SpO2: 95%	     Physical Exam:  Constitutional: A &O *3. WD/WN in NAD  HEENT: PERRL; EOMI, conjunctiva clear. No nasal discharge. No lesions in buccal cavity noted  Pharynx and Neck: Supple, no JVD, no palpable nodes. No redness of pharynx  Respiratory:  Scattered wheezes, more prominent in lower lobes.  Extremities: No cyanosis, clubbing or edema. Pedal pulses 2+  Skin: no rash or erythema noted. Warm  Neurologic: CN I, III, IV, V, VI, VI intact.  Musculoskeletal. No calf tenderness                        10.5   8.3   )-----------( 259      ( 29 Oct 2017 06:43 )             33.3   10-29      CARDIAC MARKERS ( 28 Oct 2017 01:53 )  0.019 ng/mL / x     / x     / x     / x      CARDIAC MARKERS ( 28 Oct 2017 00:01 )  0.018 ng/mL / x     / x     / x     / x      CARDIAC MARKERS ( 27 Oct 2017 20:18 )  <0.015 ng/mL / x     / x     / x     / x      10-29    137  |  106  |  32<H>  ----------------------------<  275<H>  5.8<H>   |  25  |  1.26    Ca    8.4<L>      29 Oct 2017 06:43  Mg     1.6     10-27    TPro  6.0  /  Alb  2.5<L>  /  TBili  0.3  /  DBili  x   /  AST  14<L>  /  ALT  17  /  AlkPhos  128<H>  10-29      LIVER FUNCTIONS - ( 29 Oct 2017 06:43 )  Alb: 2.5 g/dL / Pro: 6.0 gm/dL / ALK PHOS: 128 U/L / ALT: 17 U/L / AST: 14 U/L / GGT: x           PT/INR - ( 27 Oct 2017 20:18 )   PT: 12.3 sec;   INR: 1.14 ratio         PTT - ( 27 Oct 2017 20:18 )  PTT:29.0 sec

## 2017-10-29 NOTE — PROGRESS NOTE ADULT - ASSESSMENT
PROBLEMS:    Asthmatic bronchitis  chronic obstructive pulmonary disease   H/O Lung cancer  diabetes mellitus    PLAN;    po prednisone  aerosols  supportive care  dvt prophylasix

## 2017-10-29 NOTE — CONSULT NOTE ADULT - ASSESSMENT
cont albuterol/atrovent  add budesonide neb  prednisone 40 mg  dvt proph
atypical CP in a women with known CAD and CABG but recent negative cath for obstructive dz , Doubt that this is cardiac in nature , could be form COPD exercabation , pelvic cyst hiatal hernia ?  1) check echo  2) Observe on tele, CP is already improving  if it continues to improve can DC tele . Cont ASA/ PLAVIX check stool guiacs given anemia , cont bblockers and statins as well   3) COPD treatment with steeroids nebs   4) ppi  5) DVT prohylaxisis

## 2017-10-29 NOTE — CONSULT NOTE ADULT - SUBJECTIVE AND OBJECTIVE BOX
Patient is a 57y old  Female who presents with a chief complaint of cpain (28 Oct 2017 02:59)      HPI:  Pt is a 56 y/o woman with pmhx COPD/emphysema, chronic cough,  lung cancer, IDDM, MI, CABG,  presenting to the  ED with  SOB, cough and CP for 3 days.   She reports chest pain is dull, sharp and assoc with numbness of the left chest and shoulder. PAin is continous and not related to exertion , SHe had  a cardiac cath in 8/2017 that showed patent grafts and nonobstructive CAD , trop negative times two despite continoous pain.      She c/o nausea, vomiting and weakness, for which she was unable to take her insulin for 3 days.   She smokes 1 PPD. and she has lung CA     She is planned for cataract surgery by Dr. Beckford on 11/13/2017.    In the ER, she received Duonebs, IVF.        Past Med/Surg Hx   COPD,   Emphysema,   Lung cancer,   DM,   CABG three vessel, MI, non-obstr disease on cath in 8/2017  Gastroparesis and hiatal hernia on endoscopy 8/2017    Fam Hx:  non-contrib to current adm (28 Oct 2017 02:59)      PAST MEDICAL & SURGICAL HISTORY:  Diabetes  Lung cancer  COPD (chronic obstructive pulmonary disease)  No significant past surgical history                                    MEDICATIONS  (STANDING):  aspirin enteric coated 81 milliGRAM(s) Oral daily  atorvastatin 40 milliGRAM(s) Oral at bedtime  buDESOnide   0.5 milliGRAM(s) Respule 0.5 milliGRAM(s) Inhalation every 12 hours  buPROPion XL . 300 milliGRAM(s) Oral daily  clopidogrel Tablet 75 milliGRAM(s) Oral daily  dextrose 5%. 1000 milliLiter(s) (50 mL/Hr) IV Continuous <Continuous>  dextrose 50% Injectable 12.5 Gram(s) IV Push once  dextrose 50% Injectable 25 Gram(s) IV Push once  dextrose 50% Injectable 25 Gram(s) IV Push once  enalapril 2.5 milliGRAM(s) Oral every 12 hours  heparin  Injectable 5000 Unit(s) SubCutaneous every 12 hours  insulin glargine Injectable (LANTUS) 60 Unit(s) SubCutaneous two times a day  insulin lispro (HumaLOG) corrective regimen sliding scale   SubCutaneous three times a day before meals  insulin lispro (HumaLOG) corrective regimen sliding scale   SubCutaneous at bedtime  metoprolol succinate ER 25 milliGRAM(s) Oral daily  montelukast 10 milliGRAM(s) Oral daily  nicotine - 21 mG/24Hr(s) Patch 1 patch Transdermal daily  oxybutynin 5 milliGRAM(s) Oral four times a day  pantoprazole    Tablet 40 milliGRAM(s) Oral before breakfast  predniSONE   Tablet 20 milliGRAM(s) Oral two times a day  QUEtiapine 50 milliGRAM(s) Oral daily  sodium chloride 0.9%. 1000 milliLiter(s) (70 mL/Hr) IV Continuous <Continuous>  tiotropium 18 MICROgram(s) Capsule 1 Capsule(s) Inhalation daily    MEDICATIONS  (PRN):  acetaminophen   Tablet. 650 milliGRAM(s) Oral every 6 hours PRN Mild Pain (1 - 3)  ALBUTerol    90 MICROgram(s) HFA Inhaler 2 Puff(s) Inhalation every 6 hours PRN Shortness of Breath and/or Wheezing  dextrose Gel 1 Dose(s) Oral once PRN Blood Glucose LESS THAN 70 milliGRAM(s)/deciLiter  glucagon  Injectable 1 milliGRAM(s) IntraMuscular once PRN Glucose <70 milliGRAM(s)/deciLiter  ondansetron Injectable 4 milliGRAM(s) IV Push every 6 hours PRN Nausea  oxyCODONE    IR 5 milliGRAM(s) Oral four times a day PRN Moderate Pain (4 - 6)      FAMILY HISTORY:  No pertinent family history in first degree relatives      SOCIAL HISTORY:    CIGARETTES:        Vital Signs Last 24 Hrs  T(C): 37.1 (29 Oct 2017 04:39), Max: 37.4 (28 Oct 2017 21:35)  T(F): 98.7 (29 Oct 2017 04:39), Max: 99.3 (28 Oct 2017 21:35)  HR: 70 (29 Oct 2017 08:42) (67 - 80)  BP: 133/63 (29 Oct 2017 04:39) (103/51 - 133/63)  BP(mean): --  RR: 20 (29 Oct 2017 04:39) (18 - 20)  SpO2: 96% (29 Oct 2017 04:39) (94% - 98%)            INTERPRETATION OF TELEMETRY: SR no eladio    ECG:    I&O's Detail      LABS:                        10.5   8.3   )-----------( 259      ( 29 Oct 2017 06:43 )             33.3     10-29    137  |  106  |  32<H>  ----------------------------<  275<H>  5.8<H>   |  25  |  1.26    Ca    8.4<L>      29 Oct 2017 06:43  Mg     1.6     10-27    TPro  6.0  /  Alb  2.5<L>  /  TBili  0.3  /  DBili  x   /  AST  14<L>  /  ALT  17  /  AlkPhos  128<H>  10-29    CARDIAC MARKERS ( 28 Oct 2017 01:53 )  0.019 ng/mL / x     / x     / x     / x      CARDIAC MARKERS ( 28 Oct 2017 00:01 )  0.018 ng/mL / x     / x     / x     / x      CARDIAC MARKERS ( 27 Oct 2017 20:18 )  <0.015 ng/mL / x     / x     / x     / x          PT/INR - ( 27 Oct 2017 20:18 )   PT: 12.3 sec;   INR: 1.14 ratio         PTT - ( 27 Oct 2017 20:18 )  PTT:29.0 sec    I&O's Summary    BNP  RADIOLOGY & ADDITIONAL STUDIES:

## 2017-10-30 LAB
GLUCOSE BLDC GLUCOMTR-MCNC: 317 MG/DL — HIGH (ref 70–99)
GLUCOSE BLDC GLUCOMTR-MCNC: 328 MG/DL — HIGH (ref 70–99)
GLUCOSE BLDC GLUCOMTR-MCNC: 338 MG/DL — HIGH (ref 70–99)
GLUCOSE BLDC GLUCOMTR-MCNC: 372 MG/DL — HIGH (ref 70–99)
GRAM STN FLD: SIGNIFICANT CHANGE UP
SPECIMEN SOURCE: SIGNIFICANT CHANGE UP

## 2017-10-30 PROCEDURE — 99233 SBSQ HOSP IP/OBS HIGH 50: CPT

## 2017-10-30 RX ORDER — INSULIN GLARGINE 100 [IU]/ML
10 INJECTION, SOLUTION SUBCUTANEOUS ONCE
Qty: 0 | Refills: 0 | Status: COMPLETED | OUTPATIENT
Start: 2017-10-30 | End: 2017-10-30

## 2017-10-30 RX ORDER — BUDESONIDE AND FORMOTEROL FUMARATE DIHYDRATE 160; 4.5 UG/1; UG/1
2 AEROSOL RESPIRATORY (INHALATION)
Qty: 0 | Refills: 0 | Status: DISCONTINUED | OUTPATIENT
Start: 2017-10-30 | End: 2017-11-01

## 2017-10-30 RX ORDER — ZOLPIDEM TARTRATE 10 MG/1
5 TABLET ORAL AT BEDTIME
Qty: 0 | Refills: 0 | Status: DISCONTINUED | OUTPATIENT
Start: 2017-10-30 | End: 2017-11-01

## 2017-10-30 RX ORDER — INSULIN GLARGINE 100 [IU]/ML
40 INJECTION, SOLUTION SUBCUTANEOUS AT BEDTIME
Qty: 0 | Refills: 0 | Status: DISCONTINUED | OUTPATIENT
Start: 2017-10-30 | End: 2017-11-01

## 2017-10-30 RX ORDER — INSULIN GLARGINE 100 [IU]/ML
40 INJECTION, SOLUTION SUBCUTANEOUS EVERY MORNING
Qty: 0 | Refills: 0 | Status: DISCONTINUED | OUTPATIENT
Start: 2017-10-31 | End: 2017-11-01

## 2017-10-30 RX ADMIN — Medication 2.5 MILLIGRAM(S): at 06:06

## 2017-10-30 RX ADMIN — Medication 1 PATCH: at 12:56

## 2017-10-30 RX ADMIN — Medication: at 22:14

## 2017-10-30 RX ADMIN — ONDANSETRON 4 MILLIGRAM(S): 8 TABLET, FILM COATED ORAL at 23:38

## 2017-10-30 RX ADMIN — INSULIN GLARGINE 40 UNIT(S): 100 INJECTION, SOLUTION SUBCUTANEOUS at 22:05

## 2017-10-30 RX ADMIN — SODIUM CHLORIDE 70 MILLILITER(S): 9 INJECTION INTRAMUSCULAR; INTRAVENOUS; SUBCUTANEOUS at 13:29

## 2017-10-30 RX ADMIN — Medication 20 MILLIGRAM(S): at 17:03

## 2017-10-30 RX ADMIN — OXYCODONE HYDROCHLORIDE 5 MILLIGRAM(S): 5 TABLET ORAL at 02:15

## 2017-10-30 RX ADMIN — TIOTROPIUM BROMIDE 1 CAPSULE(S): 18 CAPSULE ORAL; RESPIRATORY (INHALATION) at 12:03

## 2017-10-30 RX ADMIN — INSULIN GLARGINE 10 UNIT(S): 100 INJECTION, SOLUTION SUBCUTANEOUS at 15:28

## 2017-10-30 RX ADMIN — ZOLPIDEM TARTRATE 5 MILLIGRAM(S): 10 TABLET ORAL at 22:12

## 2017-10-30 RX ADMIN — Medication 1 PATCH: at 10:51

## 2017-10-30 RX ADMIN — HEPARIN SODIUM 5000 UNIT(S): 5000 INJECTION INTRAVENOUS; SUBCUTANEOUS at 17:08

## 2017-10-30 RX ADMIN — Medication 81 MILLIGRAM(S): at 10:50

## 2017-10-30 RX ADMIN — Medication 8: at 17:03

## 2017-10-30 RX ADMIN — Medication 8: at 11:43

## 2017-10-30 RX ADMIN — INSULIN GLARGINE 25 UNIT(S): 100 INJECTION, SOLUTION SUBCUTANEOUS at 10:51

## 2017-10-30 RX ADMIN — ATORVASTATIN CALCIUM 40 MILLIGRAM(S): 80 TABLET, FILM COATED ORAL at 22:07

## 2017-10-30 RX ADMIN — Medication 2.5 MILLIGRAM(S): at 17:03

## 2017-10-30 RX ADMIN — PANTOPRAZOLE SODIUM 40 MILLIGRAM(S): 20 TABLET, DELAYED RELEASE ORAL at 06:07

## 2017-10-30 RX ADMIN — QUETIAPINE FUMARATE 50 MILLIGRAM(S): 200 TABLET, FILM COATED ORAL at 10:52

## 2017-10-30 RX ADMIN — BUPROPION HYDROCHLORIDE 300 MILLIGRAM(S): 150 TABLET, EXTENDED RELEASE ORAL at 10:52

## 2017-10-30 RX ADMIN — Medication 100 MILLIGRAM(S): at 23:41

## 2017-10-30 RX ADMIN — Medication 10: at 07:59

## 2017-10-30 RX ADMIN — Medication 5 MILLIGRAM(S): at 10:52

## 2017-10-30 RX ADMIN — Medication 25 MILLIGRAM(S): at 06:07

## 2017-10-30 RX ADMIN — OXYCODONE HYDROCHLORIDE 5 MILLIGRAM(S): 5 TABLET ORAL at 19:28

## 2017-10-30 RX ADMIN — CLOPIDOGREL BISULFATE 75 MILLIGRAM(S): 75 TABLET, FILM COATED ORAL at 10:50

## 2017-10-30 RX ADMIN — HEPARIN SODIUM 5000 UNIT(S): 5000 INJECTION INTRAVENOUS; SUBCUTANEOUS at 06:06

## 2017-10-30 RX ADMIN — MONTELUKAST 10 MILLIGRAM(S): 4 TABLET, CHEWABLE ORAL at 11:01

## 2017-10-30 RX ADMIN — OXYCODONE HYDROCHLORIDE 5 MILLIGRAM(S): 5 TABLET ORAL at 01:28

## 2017-10-30 RX ADMIN — OXYCODONE HYDROCHLORIDE 5 MILLIGRAM(S): 5 TABLET ORAL at 09:18

## 2017-10-30 RX ADMIN — Medication 20 MILLIGRAM(S): at 06:07

## 2017-10-30 RX ADMIN — Medication 5 MILLIGRAM(S): at 06:07

## 2017-10-30 RX ADMIN — Medication 5 MILLIGRAM(S): at 17:03

## 2017-10-30 NOTE — PROGRESS NOTE ADULT - ASSESSMENT
cont albuterol/atrovent  on budeonide  cont prednisone  consider start symbicort 160/4.5 2 puffs bid  dvt proph

## 2017-10-30 NOTE — PHYSICAL THERAPY INITIAL EVALUATION ADULT - PERTINENT HX OF CURRENT PROBLEM, REHAB EVAL
Pt admitted to  secondary to left sided chest pain and into shoulder. Pt also with nausea and vomiting.

## 2017-10-30 NOTE — PROGRESS NOTE ADULT - ASSESSMENT
Pt is admitted w/    # SOB, chest pain  DDX COPD exac, ACS  - nebs, O2 contd.  - cont ASA, statin  - serial troponins and EKGs  - telemetry  - cardiology consult appreciated  - ok to d/c tele as per cardio   - pulm note appreciated and will change to symbicort     #  DMII  - Continue lantus and insulin sliding scale    #Nausea/vomiting with hx of GERD/hiatal hernia/gastroparesis   - zofran not working  - cont protonix    # Homelessness, Meds, Smoking cessation  - nicotine patch  - social work consult  - pt unsure of all meds,  she has some in a list, will need to confirm in AM    # DVT prophylaxis  - heparin    dispo - likely shelter when accepted back to [facility] medically stable for dc  Pt is admitted w/    I. ACS, chest pain, pos troponin  - ASA 325mg given in ER, cont ASA, give statin  - serial troponins and EKGs  - telemetry  - cardiology consult  - NPO after midnight for possible stress test    II.  DMII  - Insulin sliding scale    III. GERD  - cont protonix, pepcid    IV.  Smoking cessation,    - nicotine patch  - social work consult      V. DVT prophylaxis  - heparin

## 2017-10-30 NOTE — PHYSICAL THERAPY INITIAL EVALUATION ADULT - GENERAL OBSERVATIONS, REHAB EVAL
Pt found supine in bed with IV, and RN present. Pt not very cooperative with PT, but was coaxed to amb to bathroom and back..

## 2017-10-30 NOTE — PHYSICAL THERAPY INITIAL EVALUATION ADULT - REHAB POTENTIAL, PT EVAL
Pt not cooperative with PT. Pt can amb with floor care on unit with rollator. Will D/C PT at this time.

## 2017-10-30 NOTE — PROGRESS NOTE ADULT - SUBJECTIVE AND OBJECTIVE BOX
Pt is a 58 y/o woman from the [facility] shelter with pmhx COPD/emphysema, chronic cough,  lung cancer, IDDM, MI, CABG,  presenting to the  ED with  SOB, cough and CP for 3 days.   She reports chest pain is dull, sharp and assoc with numbness of the left chest and shoulder.     She c/o nausea, vomiting and weakness, for which she was unable to take her insulin for 3 days.   She smokes 1 PPD.    She is planned for cataract surgery by Dr. Beckford on 11/13/2017.    In the ER, she received Duonebs, IVF.   10/29 - Patient states cough hard to control, but has had more ease of breathing, and reduced chest pain   10/30 - cough is better.  pt states she is refusing breathing treatment because she was sleeping.  abd pain today because of perisstent cough causing abd wall muscle pain.  pain worsens with cough      Review of Systems:  Other Review of Systems: All other review of systems negative, except as noted in HPI  	   Physical Exam:   ICU Vital Signs Last 24 Hrs T(C): 36.6 (30 Oct 2017 05:09), Max: 37.1 (29 Oct 2017 21:47) T(F): 97.9 (30 Oct 2017 05:09), Max: 98.8 (29 Oct 2017 21:47) HR: 70 (30 Oct 2017 12:03) (67 - 75) BP: 138/52 (30 Oct 2017 05:09) (124/49 - 138/52) BP(mean): -- ABP: -- ABP(mean): -- RR: 18 (30 Oct 2017 05:09) (18 - 18) SpO2: 99% (30 Oct 2017 05:09) (94% - 99%)  	     Physical Exam:  Constitutional: A &O *3. WD/WN in NAD  HEENT: PERRL; EOMI, conjunctiva clear. No nasal discharge. No lesions in buccal cavity noted  Pharynx and Neck: Supple, no JVD, no palpable nodes. No redness of pharynx  Respiratory:  Scattered wheezes, more prominent in lower lobes.  Extremities: No cyanosis, clubbing or edema. Pedal pulses 2+  Skin: no rash or erythema noted. Warm  Neurologic: CN I, III, IV, V, VI, VI intact.  Musculoskeletal. No calf tenderness                                      10.5   8.3   )-----------( 259      ( 29 Oct 2017 06:43 )             33.3     10-29    137  |  106  |  32<H>  ----------------------------<  275<H>  5.8<H>   |  25  |  1.26    Ca    8.4<L>      29 Oct 2017 06:43    TPro  6.0  /  Alb  2.5<L>  /  TBili  0.3  /  DBili  x   /  AST  14<L>  /  ALT  17  /  AlkPhos  128<H>  10-29        LIVER FUNCTIONS - ( 29 Oct 2017 06:43 )  Alb: 2.5 g/dL / Pro: 6.0 gm/dL / ALK PHOS: 128 U/L / ALT: 17 U/L / AST: 14 U/L / GGT: x

## 2017-10-30 NOTE — PROGRESS NOTE ADULT - SUBJECTIVE AND OBJECTIVE BOX
Subjective:  feeling better  less dyspneic    MEDICATIONS  (STANDING):  aspirin enteric coated 81 milliGRAM(s) Oral daily  atorvastatin 40 milliGRAM(s) Oral at bedtime  buDESOnide   0.5 milliGRAM(s) Respule 0.5 milliGRAM(s) Inhalation every 12 hours  buPROPion XL . 300 milliGRAM(s) Oral daily  clopidogrel Tablet 75 milliGRAM(s) Oral daily  dextrose 5%. 1000 milliLiter(s) (50 mL/Hr) IV Continuous <Continuous>  dextrose 50% Injectable 12.5 Gram(s) IV Push once  dextrose 50% Injectable 25 Gram(s) IV Push once  dextrose 50% Injectable 25 Gram(s) IV Push once  enalapril 2.5 milliGRAM(s) Oral every 12 hours  heparin  Injectable 5000 Unit(s) SubCutaneous every 12 hours  insulin glargine Injectable (LANTUS) 25 Unit(s) SubCutaneous every morning  insulin glargine Injectable (LANTUS) 30 Unit(s) SubCutaneous at bedtime  insulin lispro (HumaLOG) corrective regimen sliding scale   SubCutaneous three times a day before meals  insulin lispro (HumaLOG) corrective regimen sliding scale   SubCutaneous at bedtime  metoprolol succinate ER 25 milliGRAM(s) Oral daily  montelukast 10 milliGRAM(s) Oral daily  nicotine - 21 mG/24Hr(s) Patch 1 patch Transdermal daily  oxybutynin 5 milliGRAM(s) Oral four times a day  pantoprazole    Tablet 40 milliGRAM(s) Oral before breakfast  predniSONE   Tablet 20 milliGRAM(s) Oral two times a day  QUEtiapine 50 milliGRAM(s) Oral daily  sodium chloride 0.9%. 1000 milliLiter(s) (70 mL/Hr) IV Continuous <Continuous>  tiotropium 18 MICROgram(s) Capsule 1 Capsule(s) Inhalation daily    MEDICATIONS  (PRN):  acetaminophen   Tablet. 650 milliGRAM(s) Oral every 6 hours PRN Mild Pain (1 - 3)  ALBUTerol    90 MICROgram(s) HFA Inhaler 2 Puff(s) Inhalation every 6 hours PRN Shortness of Breath and/or Wheezing  dextrose Gel 1 Dose(s) Oral once PRN Blood Glucose LESS THAN 70 milliGRAM(s)/deciliter  glucagon  Injectable 1 milliGRAM(s) IntraMuscular once PRN Glucose LESS THAN 70 milligrams/deciliter  ondansetron Injectable 4 milliGRAM(s) IV Push every 6 hours PRN Nausea  oxyCODONE    IR 5 milliGRAM(s) Oral four times a day PRN Moderate Pain (4 - 6)      Allergies    Dilaudid (Unknown)    Intolerances        REVIEW OF SYSTEMS:    CONSTITUTIONAL:  As per HPI.  HEENT:  Eyes:  No diplopia or blurred vision. ENT:  No earache, sore throat or runny nose.  CARDIOVASCULAR:  No pressure, squeezing, tightness, heaviness or aching about the chest, neck, axilla or epigastrium.  RESPIRATORY:  No cough, shortness of breath, PND or orthopnea.  GASTROINTESTINAL:  No nausea, vomiting or diarrhea.  GENITOURINARY:  No dysuria, frequency or urgency.  MUSCULOSKELETAL:  no joint pain, deformity, tenderness  EXTREMITIES: no clubbing cyanosis,edema  SKIN:  No change in skin, hair or nails.  NEUROLOGIC:  No paresthesias, fasciculations, seizures or weakness.  PSYCHIATRIC:  No disorder of thought or mood.  ENDOCRINE:  No heat or cold intolerance, polyuria or polydipsia.  HEMATOLOGICAL:  No easy bruising or bleedings:    Vital Signs Last 24 Hrs  T(C): 36.6 (30 Oct 2017 05:09), Max: 37.1 (29 Oct 2017 21:47)  T(F): 97.9 (30 Oct 2017 05:09), Max: 98.8 (29 Oct 2017 21:47)  HR: 67 (30 Oct 2017 05:09) (67 - 75)  BP: 138/52 (30 Oct 2017 05:09) (124/49 - 138/52)  BP(mean): --  RR: 18 (30 Oct 2017 05:09) (18 - 18)  SpO2: 99% (30 Oct 2017 05:09) (94% - 99%)    PHYSICAL EXAMINATION:  SKIN: no rashes  HEAD: NC/AT  EYES: PERRLA, EOMI  EARS: TM's intact  NOSE: no abnormalities  NECK:  Supple. No lymphadenopathy. Jugular venous pressure not elevated. Carotids equal.   HEART:   The cardiac impulse has a normal quality. Reg., Nl S1 and S2.  There are no murmurs, rubs or gallops noted  CHEST:  Chest is clear to auscultation. Normal respiratory effort.  ABDOMEN:  Soft and nontender.   EXTREMITIES:  no C/C/E  NEURO: AAO x 3, no focal deficts       LABS:                        10.5   8.3   )-----------( 259      ( 29 Oct 2017 06:43 )             33.3     10-29    137  |  106  |  32<H>  ----------------------------<  275<H>  5.8<H>   |  25  |  1.26    Ca    8.4<L>      29 Oct 2017 06:43    TPro  6.0  /  Alb  2.5<L>  /  TBili  0.3  /  DBili  x   /  AST  14<L>  /  ALT  17  /  AlkPhos  128<H>  10-29          RADIOLOGY & ADDITIONAL TESTS:

## 2017-10-31 LAB
GLUCOSE BLDC GLUCOMTR-MCNC: 125 MG/DL — HIGH (ref 70–99)
GLUCOSE BLDC GLUCOMTR-MCNC: 232 MG/DL — HIGH (ref 70–99)
GLUCOSE BLDC GLUCOMTR-MCNC: 266 MG/DL — HIGH (ref 70–99)
GLUCOSE BLDC GLUCOMTR-MCNC: 279 MG/DL — HIGH (ref 70–99)
GLUCOSE BLDC GLUCOMTR-MCNC: 325 MG/DL — HIGH (ref 70–99)

## 2017-10-31 RX ADMIN — Medication 20 MILLIGRAM(S): at 05:22

## 2017-10-31 RX ADMIN — ZOLPIDEM TARTRATE 5 MILLIGRAM(S): 10 TABLET ORAL at 22:22

## 2017-10-31 RX ADMIN — ONDANSETRON 4 MILLIGRAM(S): 8 TABLET, FILM COATED ORAL at 22:30

## 2017-10-31 RX ADMIN — INSULIN GLARGINE 40 UNIT(S): 100 INJECTION, SOLUTION SUBCUTANEOUS at 22:24

## 2017-10-31 RX ADMIN — Medication 5 MILLIGRAM(S): at 17:18

## 2017-10-31 RX ADMIN — ATORVASTATIN CALCIUM 40 MILLIGRAM(S): 80 TABLET, FILM COATED ORAL at 22:22

## 2017-10-31 RX ADMIN — Medication 8: at 17:17

## 2017-10-31 RX ADMIN — Medication 5 MILLIGRAM(S): at 00:40

## 2017-10-31 RX ADMIN — HEPARIN SODIUM 5000 UNIT(S): 5000 INJECTION INTRAVENOUS; SUBCUTANEOUS at 17:18

## 2017-10-31 RX ADMIN — QUETIAPINE FUMARATE 50 MILLIGRAM(S): 200 TABLET, FILM COATED ORAL at 11:51

## 2017-10-31 RX ADMIN — INSULIN GLARGINE 40 UNIT(S): 100 INJECTION, SOLUTION SUBCUTANEOUS at 08:35

## 2017-10-31 RX ADMIN — Medication 25 MILLIGRAM(S): at 05:22

## 2017-10-31 RX ADMIN — SODIUM CHLORIDE 70 MILLILITER(S): 9 INJECTION INTRAMUSCULAR; INTRAVENOUS; SUBCUTANEOUS at 06:18

## 2017-10-31 RX ADMIN — CLOPIDOGREL BISULFATE 75 MILLIGRAM(S): 75 TABLET, FILM COATED ORAL at 11:51

## 2017-10-31 RX ADMIN — TIOTROPIUM BROMIDE 1 CAPSULE(S): 18 CAPSULE ORAL; RESPIRATORY (INHALATION) at 07:46

## 2017-10-31 RX ADMIN — ALBUTEROL 2 PUFF(S): 90 AEROSOL, METERED ORAL at 07:46

## 2017-10-31 RX ADMIN — Medication 81 MILLIGRAM(S): at 11:50

## 2017-10-31 RX ADMIN — MONTELUKAST 10 MILLIGRAM(S): 4 TABLET, CHEWABLE ORAL at 11:51

## 2017-10-31 RX ADMIN — PANTOPRAZOLE SODIUM 40 MILLIGRAM(S): 20 TABLET, DELAYED RELEASE ORAL at 05:21

## 2017-10-31 RX ADMIN — Medication 2.5 MILLIGRAM(S): at 17:18

## 2017-10-31 RX ADMIN — Medication 5 MILLIGRAM(S): at 05:22

## 2017-10-31 RX ADMIN — Medication 20 MILLIGRAM(S): at 17:18

## 2017-10-31 RX ADMIN — Medication 1 PATCH: at 11:48

## 2017-10-31 RX ADMIN — Medication 4: at 11:49

## 2017-10-31 RX ADMIN — Medication 2.5 MILLIGRAM(S): at 05:22

## 2017-10-31 RX ADMIN — Medication 6: at 08:35

## 2017-10-31 RX ADMIN — BUPROPION HYDROCHLORIDE 300 MILLIGRAM(S): 150 TABLET, EXTENDED RELEASE ORAL at 11:50

## 2017-10-31 RX ADMIN — Medication 5 MILLIGRAM(S): at 11:50

## 2017-10-31 RX ADMIN — HEPARIN SODIUM 5000 UNIT(S): 5000 INJECTION INTRAVENOUS; SUBCUTANEOUS at 05:21

## 2017-10-31 NOTE — PROGRESS NOTE ADULT - ASSESSMENT
Pt is admitted w/    # SOB, chest pain  DDX COPD exac, ACS  - nebs, O2 contd.  - cont ASA, statin  - serial troponins and EKGs  - telemetry  - cardiology consult appreciated  - ok to d/c tele as per cardio   - pulm note appreciated and will change to symbicort     #  DMII - FSBG improving since lantus increased   - Continue lantus and insulin sliding scale    #Nausea/vomiting with hx of GERD/hiatal hernia/gastroparesis   - zofran not working  - cont protonix    # Homelessness, Meds, Smoking cessation  - nicotine patch  - social work consult  - pt unsure of all meds,  she has some in a list, will need to confirm in AM    # DVT prophylaxis  - heparin    dispo - likely shelter when accepted back to [facility] medically stable for dc  Pt is admitted w/    I. ACS, chest pain, pos troponin  - ASA 325mg given in ER, cont ASA, give statin  - serial troponins and EKGs  - telemetry  - cardiology consult  - NPO after midnight for possible stress test    II.  DMII  - Insulin sliding scale    III. GERD  - cont protonix, pepcid    IV.  Smoking cessation,    - nicotine patch  - social work consult      V. DVT prophylaxis  - heparin

## 2017-10-31 NOTE — PROGRESS NOTE ADULT - SUBJECTIVE AND OBJECTIVE BOX
Pt is a 56 y/o woman from the [facility] shelter with pmhx COPD/emphysema, chronic cough,  lung cancer, IDDM, MI, CABG,  presenting to the  ED with  SOB, cough and CP for 3 days.   She reports chest pain is dull, sharp and assoc with numbness of the left chest and shoulder.     She c/o nausea, vomiting and weakness, for which she was unable to take her insulin for 3 days.   She smokes 1 PPD.    She is planned for cataract surgery by Dr. Beckford on 11/13/2017.    In the ER, she received Duonebs, IVF.   10/29 - Patient states cough hard to control, but has had more ease of breathing, and reduced chest pain   10/30 - cough is better.  pt states she is refusing breathing treatment because she was sleeping.  abd pain today because of perisstent cough causing abd wall muscle pain.  pain worsens with cough   10/31 - feeling better     Review of Systems:  Other Review of Systems: All other review of systems negative, except as noted in HPI  	   Physical Exam:   ICU Vital Signs Last 24 Hrs T(C): 36.8 (31 Oct 2017 11:39), Max: 36.8 (31 Oct 2017 11:39) T(F): 98.2 (31 Oct 2017 11:39), Max: 98.2 (31 Oct 2017 11:39) HR: 63 (31 Oct 2017 11:39) (63 - 67) BP: 138/53 (31 Oct 2017 11:39) (138/53 - 172/70) BP(mean): -- ABP: -- ABP(mean): -- RR: 17 (31 Oct 2017 11:39) (17 - 18) SpO2: 95% (31 Oct 2017 11:39) (95% - 97%)  	     Physical Exam:  Constitutional: A &O *3. WD/WN in NAD  HEENT: PERRL; EOMI, conjunctiva clear. No nasal discharge. No lesions in buccal cavity noted  Pharynx and Neck: Supple, no JVD, no palpable nodes. No redness of pharynx  Respiratory:  Scattered wheezes, more prominent in lower lobes.  Extremities: No cyanosis, clubbing or edema. Pedal pulses 2+  Skin: no rash or erythema noted. Warm  Neurologic: CN I, III, IV, V, VI, VI intact.  Musculoskeletal. No calf tenderness            labs - reviewed

## 2017-11-01 ENCOUNTER — TRANSCRIPTION ENCOUNTER (OUTPATIENT)
Age: 57
End: 2017-11-01

## 2017-11-01 VITALS — WEIGHT: 208.78 LBS

## 2017-11-01 LAB
GLUCOSE BLDC GLUCOMTR-MCNC: 253 MG/DL — HIGH (ref 70–99)
GLUCOSE BLDC GLUCOMTR-MCNC: 280 MG/DL — HIGH (ref 70–99)

## 2017-11-01 PROCEDURE — 99233 SBSQ HOSP IP/OBS HIGH 50: CPT

## 2017-11-01 RX ORDER — BUDESONIDE AND FORMOTEROL FUMARATE DIHYDRATE 160; 4.5 UG/1; UG/1
2 AEROSOL RESPIRATORY (INHALATION)
Qty: 1 | Refills: 0 | OUTPATIENT
Start: 2017-11-01

## 2017-11-01 RX ADMIN — Medication 81 MILLIGRAM(S): at 11:58

## 2017-11-01 RX ADMIN — Medication 25 MILLIGRAM(S): at 07:05

## 2017-11-01 RX ADMIN — Medication 2.5 MILLIGRAM(S): at 07:06

## 2017-11-01 RX ADMIN — MONTELUKAST 10 MILLIGRAM(S): 4 TABLET, CHEWABLE ORAL at 11:58

## 2017-11-01 RX ADMIN — Medication 6: at 09:06

## 2017-11-01 RX ADMIN — BUPROPION HYDROCHLORIDE 300 MILLIGRAM(S): 150 TABLET, EXTENDED RELEASE ORAL at 11:58

## 2017-11-01 RX ADMIN — Medication 20 MILLIGRAM(S): at 07:06

## 2017-11-01 RX ADMIN — Medication 100 MILLIGRAM(S): at 02:04

## 2017-11-01 RX ADMIN — TIOTROPIUM BROMIDE 1 CAPSULE(S): 18 CAPSULE ORAL; RESPIRATORY (INHALATION) at 07:17

## 2017-11-01 RX ADMIN — BUDESONIDE AND FORMOTEROL FUMARATE DIHYDRATE 2 PUFF(S): 160; 4.5 AEROSOL RESPIRATORY (INHALATION) at 07:23

## 2017-11-01 RX ADMIN — Medication 5 MILLIGRAM(S): at 07:05

## 2017-11-01 RX ADMIN — Medication 1 PATCH: at 11:58

## 2017-11-01 RX ADMIN — PANTOPRAZOLE SODIUM 40 MILLIGRAM(S): 20 TABLET, DELAYED RELEASE ORAL at 07:05

## 2017-11-01 RX ADMIN — Medication 5 MILLIGRAM(S): at 11:58

## 2017-11-01 RX ADMIN — INSULIN GLARGINE 40 UNIT(S): 100 INJECTION, SOLUTION SUBCUTANEOUS at 09:00

## 2017-11-01 RX ADMIN — ONDANSETRON 4 MILLIGRAM(S): 8 TABLET, FILM COATED ORAL at 08:59

## 2017-11-01 RX ADMIN — ALBUTEROL 2 PUFF(S): 90 AEROSOL, METERED ORAL at 07:23

## 2017-11-01 RX ADMIN — HEPARIN SODIUM 5000 UNIT(S): 5000 INJECTION INTRAVENOUS; SUBCUTANEOUS at 07:05

## 2017-11-01 RX ADMIN — CLOPIDOGREL BISULFATE 75 MILLIGRAM(S): 75 TABLET, FILM COATED ORAL at 11:58

## 2017-11-01 RX ADMIN — QUETIAPINE FUMARATE 50 MILLIGRAM(S): 200 TABLET, FILM COATED ORAL at 11:58

## 2017-11-01 RX ADMIN — Medication 1 PATCH: at 12:00

## 2017-11-01 RX ADMIN — Medication 6: at 11:58

## 2017-11-01 NOTE — DISCHARGE NOTE ADULT - MEDICATION SUMMARY - MEDICATIONS TO TAKE
I will START or STAY ON the medications listed below when I get home from the hospital:    predniSONE 10 mg oral tablet  -- 3 tab(s) by mouth once a day x 2 days  2 tab(s) by mouth once a day x 2 days  1 tab(s) by mouth once a day x 2 days  -- Indication: For for your lung    oxyCODONE 5 mg oral capsule  -- 1 cap(s) by mouth every 6 hours  -- Indication: For pain medicine    acetaminophen 325 mg oral tablet  -- 2 tab(s) by mouth every 6 hours, As needed, Mild Pain (1 - 3)  -- Indication: For pain medicine    aspirin 81 mg oral delayed release tablet  -- 1 tab(s) by mouth once a day  -- Indication: For heart medicine    enalapril 2.5 mg oral tablet  -- 1 tab(s) by mouth every 12 hours  -- Indication: For blood pressure medicine    insulin lispro 100 units/mL subcutaneous solution  -- 1 Unit(s) if Glucose 151 - 200  2 Unit(s) if Glucose 201 - 250  3 Unit(s) if Glucose 251 - 300  4 Unit(s) if Glucose 301 - 350  5 Unit(s) if Glucose 351 - 400  6 Unit(s) if Glucose GREATER THAN 400  -- Indication: For Diabetes medicine    Lantus 100 units/mL subcutaneous solution  -- 1 dose(s) subcutaneous 2 times a day  -- Indication: For Diabtes medicien    atorvastatin 40 mg oral tablet  -- 1 tab(s) by mouth once a day (at bedtime)  -- Indication: For Choelsterol medicine    Plavix 75 mg oral tablet  -- 1 tab(s) by mouth once a day  -- Indication: For heart medicine    QUEtiapine 50 mg oral tablet, extended release  -- 1 tab(s) by mouth once a day (in the evening)  -- Indication: For mood    metoprolol succinate 25 mg oral tablet, extended release  -- 1 tab(s) by mouth once a day  -- Indication: For blod pressure medicine    budesonide-formoterol 160 mcg-4.5 mcg/inh inhalation aerosol  -- 2 puff(s) inhaled 2 times a day   -- Indication: For Lung medicine    albuterol 90 mcg/inh inhalation aerosol  -- 2 puff(s) inhaled every 6 hours, As needed, Shortness of Breath and/or Wheezing  -- Indication: For breathing treatment    albuterol-ipratropium 2.5 mg-0.5 mg/3 mL inhalation solution  -- 3 milliliter(s) inhaled every 6 hours, As Needed wheezing/shortness of breath  -- Indication: For breathing treatment    tiotropium 18 mcg inhalation capsule  -- 1 cap(s) inhaled once a day  -- Indication: For breathing treatment    guaiFENesin 100 mg/5 mL oral liquid  -- 5 milliliter(s) by mouth every 6 hours, As needed, Cough  -- Indication: For COugh medicine    Singulair 10 mg oral tablet  -- 1 tab(s) by mouth once a day  -- Indication: For COugh medicine    pantoprazole 40 mg oral delayed release tablet  -- 1 tab(s) by mouth once a day (before a meal)  -- Indication: For stomach medicien    buPROPion 150 mg/12 hours (SR) oral tablet, extended release  -- 1 tab(s) by mouth 2 times a day  -- Indication: For mood    oxybutynin 5 mg oral tablet  -- 1 tab(s) by mouth 4 times a day  -- Indication: For bladder medicine

## 2017-11-01 NOTE — PROGRESS NOTE ADULT - ASSESSMENT
feeeling better  cont prednisone  start symbicort 160/4.5 2 puffs bid  ok for discharge  will follow up as outpatient  dvt proph

## 2017-11-01 NOTE — DISCHARGE NOTE ADULT - PATIENT PORTAL LINK FT
“You can access the [employer] Patient Portal, offered by [facility], by registering with the following website: [email or web address]”

## 2017-11-01 NOTE — PROGRESS NOTE ADULT - SUBJECTIVE AND OBJECTIVE BOX
Subjective:  feeling much better  ambulating      MEDICATIONS  (STANDING):  aspirin enteric coated 81 milliGRAM(s) Oral daily  atorvastatin 40 milliGRAM(s) Oral at bedtime  buDESOnide 160 MICROgram(s)/formoterol 4.5 MICROgram(s) Inhaler 2 Puff(s) Inhalation two times a day  buPROPion XL . 300 milliGRAM(s) Oral daily  clopidogrel Tablet 75 milliGRAM(s) Oral daily  dextrose 5%. 1000 milliLiter(s) (50 mL/Hr) IV Continuous <Continuous>  dextrose 50% Injectable 12.5 Gram(s) IV Push once  dextrose 50% Injectable 25 Gram(s) IV Push once  dextrose 50% Injectable 25 Gram(s) IV Push once  enalapril 2.5 milliGRAM(s) Oral every 12 hours  heparin  Injectable 5000 Unit(s) SubCutaneous every 12 hours  insulin glargine Injectable (LANTUS) 40 Unit(s) SubCutaneous every morning  insulin glargine Injectable (LANTUS) 40 Unit(s) SubCutaneous at bedtime  insulin lispro (HumaLOG) corrective regimen sliding scale   SubCutaneous three times a day before meals  insulin lispro (HumaLOG) corrective regimen sliding scale   SubCutaneous at bedtime  metoprolol succinate ER 25 milliGRAM(s) Oral daily  montelukast 10 milliGRAM(s) Oral daily  nicotine - 21 mG/24Hr(s) Patch 1 patch Transdermal daily  oxybutynin 5 milliGRAM(s) Oral four times a day  pantoprazole    Tablet 40 milliGRAM(s) Oral before breakfast  predniSONE   Tablet 20 milliGRAM(s) Oral two times a day  QUEtiapine 50 milliGRAM(s) Oral daily  tiotropium 18 MICROgram(s) Capsule 1 Capsule(s) Inhalation daily    MEDICATIONS  (PRN):  acetaminophen   Tablet. 650 milliGRAM(s) Oral every 6 hours PRN Mild Pain (1 - 3)  ALBUTerol    90 MICROgram(s) HFA Inhaler 2 Puff(s) Inhalation every 6 hours PRN Shortness of Breath and/or Wheezing  dextrose Gel 1 Dose(s) Oral once PRN Blood Glucose LESS THAN 70 milliGRAM(s)/deciliter  glucagon  Injectable 1 milliGRAM(s) IntraMuscular once PRN Glucose LESS THAN 70 milligrams/deciliter  guaiFENesin   Syrup  (Sugar-Free) 100 milliGRAM(s) Oral every 6 hours PRN Cough  ondansetron Injectable 4 milliGRAM(s) IV Push every 6 hours PRN Nausea  oxyCODONE    IR 5 milliGRAM(s) Oral four times a day PRN Moderate Pain (4 - 6)  zolpidem 5 milliGRAM(s) Oral at bedtime PRN Insomnia      Allergies    Dilaudid (Unknown)    Intolerances        REVIEW OF SYSTEMS:    CONSTITUTIONAL:  As per HPI.  HEENT:  Eyes:  No diplopia or blurred vision. ENT:  No earache, sore throat or runny nose.  CARDIOVASCULAR:  No pressure, squeezing, tightness, heaviness or aching about the chest, neck, axilla or epigastrium.  RESPIRATORY:  No cough, shortness of breath, PND or orthopnea.  GASTROINTESTINAL:  No nausea, vomiting or diarrhea.  GENITOURINARY:  No dysuria, frequency or urgency.  MUSCULOSKELETAL:  no joint pain, deformity, tenderness  EXTREMITIES: no clubbing cyanosis,edema  SKIN:  No change in skin, hair or nails.  NEUROLOGIC:  No paresthesias, fasciculations, seizures or weakness.  PSYCHIATRIC:  No disorder of thought or mood.  ENDOCRINE:  No heat or cold intolerance, polyuria or polydipsia.  HEMATOLOGICAL:  No easy bruising or bleedings:    Vital Signs Last 24 Hrs  T(C): 36.5 (01 Nov 2017 05:31), Max: 36.8 (31 Oct 2017 11:39)  T(F): 97.7 (01 Nov 2017 05:31), Max: 98.2 (31 Oct 2017 11:39)  HR: 60 (01 Nov 2017 07:24) (58 - 65)  BP: 146/54 (01 Nov 2017 05:31) (138/53 - 147/57)  BP(mean): --  RR: 17 (01 Nov 2017 05:31) (17 - 17)  SpO2: 100% (01 Nov 2017 05:31) (95% - 100%)    PHYSICAL EXAMINATION:  SKIN: no rashes  HEAD: NC/AT  EYES: PERRLA, EOMI  EARS: TM's intact  NOSE: no abnormalities  NECK:  Supple. No lymphadenopathy. Jugular venous pressure not elevated. Carotids equal.   HEART:   The cardiac impulse has a normal quality. Reg., Nl S1 and S2.  There are no murmurs, rubs or gallops noted  CHEST: scattered ronchi  ABDOMEN:  Soft and nontender.   EXTREMITIES:  no C/C/E  NEURO: AAO x 3, no focal deficts       LABS:                RADIOLOGY & ADDITIONAL TESTS:

## 2017-11-01 NOTE — DISCHARGE NOTE ADULT - HOSPITAL COURSE
Pt is a 58 y/o woman from the [facility] shelter with pmhx COPD/emphysema, chronic cough,  lung cancer, IDDM, MI, CABG,  presenting to the  ED with  SOB, cough and CP for 3 days.   She reports chest pain is dull, sharp and assoc with numbness of the left chest and shoulder.     She c/o nausea, vomiting and weakness, for which she was unable to take her insulin for 3 days.   She smokes 1 PPD.  pt admitted with COPD exacerbation and treated with prednisone taper and symbicort added on this admission.  patient discharged to [facility].  pt strongly urged to quit smoking as this is likely leading to repeated exacerbations.     ICU Vital Signs Last 24 Hrs  T(C): 36.7 (01 Nov 2017 11:11), Max: 36.7 (01 Nov 2017 11:11)  T(F): 98 (01 Nov 2017 11:11), Max: 98 (01 Nov 2017 11:11)  HR: 70 (01 Nov 2017 11:11) (58 - 70)  BP: 169/63 (01 Nov 2017 11:11) (142/64 - 169/63)  RR: 17 (01 Nov 2017 11:11) (17 - 17)  SpO2: 97% (01 Nov 2017 11:11) (97% - 100%)       Physical Exam:  Constitutional: A &O *3. WD/WN in NAD  HEENT: PERRL; EOMI, conjunctiva clear. No nasal discharge. No lesions in buccal cavity noted  Pharynx and Neck: Supple, no JVD, no palpable nodes. No redness of pharynx  Respiratory:  Scattered wheezes, more prominent in lower lobes.  Extremities: No cyanosis, clubbing or edema. Pedal pulses 2+  Skin: no rash or erythema noted. Warm  Neurologic: CN I, III, IV, V, VI, VI intact.  Musculoskeletal. No calf tenderness        total time spent on d/c

## 2017-11-01 NOTE — DISCHARGE NOTE ADULT - CARE PROVIDER_API CALL
your primary dr herzog,   Phone: (   )    -  Fax: (   )    -    Daniel Dickey), Internal Medicine; Pulmonary Disease  [address] [place] [address]  Phone: [phone]  Fax: [phone]

## 2017-11-01 NOTE — DISCHARGE NOTE ADULT - CARE PLAN
Principal Discharge DX:	Chronic obstructive pulmonary disease with acute exacerbation  Goal:	breathe better  Instructions for follow-up, activity and diet:	complete prednisone taper and start symbicort

## 2017-11-02 LAB
-  AMIKACIN: SIGNIFICANT CHANGE UP
-  AZITHROMYCIN: SIGNIFICANT CHANGE UP
-  AZTREONAM: SIGNIFICANT CHANGE UP
-  CEFEPIME: SIGNIFICANT CHANGE UP
-  CEFTAZIDIME: SIGNIFICANT CHANGE UP
-  CEFTRIAXONE: SIGNIFICANT CHANGE UP
-  CIPROFLOXACIN: SIGNIFICANT CHANGE UP
-  CLINDAMYCIN: SIGNIFICANT CHANGE UP
-  ERYTHROMYCIN: SIGNIFICANT CHANGE UP
-  GENTAMICIN: SIGNIFICANT CHANGE UP
-  IMIPENEM: SIGNIFICANT CHANGE UP
-  LEVOFLOXACIN: SIGNIFICANT CHANGE UP
-  LEVOFLOXACIN: SIGNIFICANT CHANGE UP
-  MEROPENEM: SIGNIFICANT CHANGE UP
-  PENICILLIN: SIGNIFICANT CHANGE UP
-  PIPERACILLIN/TAZOBACTAM: SIGNIFICANT CHANGE UP
-  TOBRAMYCIN: SIGNIFICANT CHANGE UP
-  TRIMETHOPRIM/SULFAMETHOXAZOLE: SIGNIFICANT CHANGE UP
-  VANCOMYCIN: SIGNIFICANT CHANGE UP
CULTURE RESULTS: SIGNIFICANT CHANGE UP
METHOD TYPE: SIGNIFICANT CHANGE UP
ORGANISM # SPEC MICROSCOPIC CNT: SIGNIFICANT CHANGE UP
SPECIMEN SOURCE: SIGNIFICANT CHANGE UP

## 2017-11-02 RX ORDER — FLUTICASONE PROPIONATE AND SALMETEROL 50; 250 UG/1; UG/1
1 POWDER ORAL; RESPIRATORY (INHALATION)
Qty: 1 | Refills: 0 | OUTPATIENT
Start: 2017-11-02

## 2017-11-05 DIAGNOSIS — I10 ESSENTIAL (PRIMARY) HYPERTENSION: ICD-10-CM

## 2017-11-05 DIAGNOSIS — I25.10 ATHEROSCLEROTIC HEART DISEASE OF NATIVE CORONARY ARTERY WITHOUT ANGINA PECTORIS: ICD-10-CM

## 2017-11-05 DIAGNOSIS — K21.9 GASTRO-ESOPHAGEAL REFLUX DISEASE WITHOUT ESOPHAGITIS: ICD-10-CM

## 2017-11-05 DIAGNOSIS — R32 UNSPECIFIED URINARY INCONTINENCE: ICD-10-CM

## 2017-11-05 DIAGNOSIS — Z59.0 HOMELESSNESS: ICD-10-CM

## 2017-11-05 DIAGNOSIS — Z95.1 PRESENCE OF AORTOCORONARY BYPASS GRAFT: ICD-10-CM

## 2017-11-05 DIAGNOSIS — J45.909 UNSPECIFIED ASTHMA, UNCOMPLICATED: ICD-10-CM

## 2017-11-05 DIAGNOSIS — K31.84 GASTROPARESIS: ICD-10-CM

## 2017-11-05 DIAGNOSIS — I25.2 OLD MYOCARDIAL INFARCTION: ICD-10-CM

## 2017-11-05 DIAGNOSIS — J44.1 CHRONIC OBSTRUCTIVE PULMONARY DISEASE WITH (ACUTE) EXACERBATION: ICD-10-CM

## 2017-11-05 DIAGNOSIS — K44.9 DIAPHRAGMATIC HERNIA WITHOUT OBSTRUCTION OR GANGRENE: ICD-10-CM

## 2017-11-05 DIAGNOSIS — F17.200 NICOTINE DEPENDENCE, UNSPECIFIED, UNCOMPLICATED: ICD-10-CM

## 2017-11-05 DIAGNOSIS — E11.65 TYPE 2 DIABETES MELLITUS WITH HYPERGLYCEMIA: ICD-10-CM

## 2017-11-05 DIAGNOSIS — R07.9 CHEST PAIN, UNSPECIFIED: ICD-10-CM

## 2017-11-05 DIAGNOSIS — Z85.118 PERSONAL HISTORY OF OTHER MALIGNANT NEOPLASM OF BRONCHUS AND LUNG: ICD-10-CM

## 2017-11-05 DIAGNOSIS — R07.89 OTHER CHEST PAIN: ICD-10-CM

## 2017-11-05 SDOH — ECONOMIC STABILITY - HOUSING INSECURITY: HOMELESSNESS: Z59.0

## 2017-11-21 ENCOUNTER — EMERGENCY (EMERGENCY)
Facility: HOSPITAL | Age: 57
LOS: 0 days | Discharge: HOME | End: 2017-11-21
Attending: EMERGENCY MEDICINE | Admitting: EMERGENCY MEDICINE
Payer: MEDICAID

## 2017-11-21 VITALS
RESPIRATION RATE: 19 BRPM | DIASTOLIC BLOOD PRESSURE: 55 MMHG | WEIGHT: 201.06 LBS | HEART RATE: 71 BPM | SYSTOLIC BLOOD PRESSURE: 103 MMHG | TEMPERATURE: 98 F | HEIGHT: 64 IN | OXYGEN SATURATION: 99 %

## 2017-11-21 VITALS
TEMPERATURE: 98 F | HEART RATE: 69 BPM | SYSTOLIC BLOOD PRESSURE: 110 MMHG | OXYGEN SATURATION: 100 % | DIASTOLIC BLOOD PRESSURE: 62 MMHG | RESPIRATION RATE: 17 BRPM

## 2017-11-21 DIAGNOSIS — R51 HEADACHE: ICD-10-CM

## 2017-11-21 DIAGNOSIS — F17.210 NICOTINE DEPENDENCE, CIGARETTES, UNCOMPLICATED: ICD-10-CM

## 2017-11-21 PROCEDURE — 99283 EMERGENCY DEPT VISIT LOW MDM: CPT

## 2017-11-21 NOTE — ED PROVIDER NOTE - MEDICAL DECISION MAKING DETAILS
patient without complaints, no focal deficits, well appearing. appropriate for discharge home. symptoms not consistent with neurological process.

## 2017-11-21 NOTE — ED ADULT NURSE NOTE - CHPI ED SYMPTOMS NEG
no weakness/no chills/no nausea/no numbness/no pain/no fever/no decreased eating/drinking/no tingling/no dizziness/no vomiting

## 2017-11-21 NOTE — ED ADULT NURSE NOTE - OBJECTIVE STATEMENT
Pt is a 57y female, A & O x 3, VSS, presents to ED from [facility], pt has no complaints, states staff at [facility] sent her to ED following episode of "Jennie Vu", MD Cohen aware, as per MD Cohen, no IV, labs, EKG or BGM check at this time, pt denies chest pain, SOB, nausea, vomiting or dizziness. Pt hx CABG, DM, HTN, Pt in no apparent distress, bed rails, up, will continue to monitor. as per MD Cohen, pt fed.

## 2017-11-21 NOTE — ED PROVIDER NOTE - OBJECTIVE STATEMENT
56 y/o female with PMHx of DM, COPD, Lung CA presents to the ED BIBEMS from [facility]. EMS was called when pt began to experience an episode of stiffness, weakness. Episode last 1 minute, occurred as pt was going to put eyedrops in. No LOC. All sx currently resolved in ED. Pt has no complaints and states that she did not want to come to ED. Notes prior episodes since childhood, more recent episode prior to today was last month. Smoker, 1/2 pack a day.

## 2017-11-22 PROBLEM — E11.9 TYPE 2 DIABETES MELLITUS WITHOUT COMPLICATIONS: Chronic | Status: ACTIVE | Noted: 2017-10-27

## 2017-11-28 ENCOUNTER — EMERGENCY (EMERGENCY)
Facility: HOSPITAL | Age: 57
LOS: 0 days | Discharge: HOME | End: 2017-11-29
Attending: EMERGENCY MEDICINE | Admitting: EMERGENCY MEDICINE
Payer: MEDICAID

## 2017-11-28 VITALS — TEMPERATURE: 98 F | OXYGEN SATURATION: 100 % | HEIGHT: 63 IN | RESPIRATION RATE: 20 BRPM | WEIGHT: 149.91 LBS

## 2017-11-28 LAB
BASOPHILS # BLD AUTO: 0.1 K/UL — SIGNIFICANT CHANGE UP (ref 0–0.2)
BASOPHILS NFR BLD AUTO: 0.9 % — SIGNIFICANT CHANGE UP (ref 0–2)
EOSINOPHIL # BLD AUTO: 0.3 K/UL — SIGNIFICANT CHANGE UP (ref 0–0.5)
EOSINOPHIL NFR BLD AUTO: 3.2 % — SIGNIFICANT CHANGE UP (ref 0–6)
HCT VFR BLD CALC: 36.3 % — SIGNIFICANT CHANGE UP (ref 34.5–45)
HGB BLD-MCNC: 12.1 G/DL — SIGNIFICANT CHANGE UP (ref 11.5–15.5)
LYMPHOCYTES # BLD AUTO: 2.6 K/UL — SIGNIFICANT CHANGE UP (ref 1–3.3)
LYMPHOCYTES # BLD AUTO: 28.8 % — SIGNIFICANT CHANGE UP (ref 13–44)
MCHC RBC-ENTMCNC: 27.6 PG — SIGNIFICANT CHANGE UP (ref 27–34)
MCHC RBC-ENTMCNC: 33.3 GM/DL — SIGNIFICANT CHANGE UP (ref 32–36)
MCV RBC AUTO: 82.9 FL — SIGNIFICANT CHANGE UP (ref 80–100)
MONOCYTES # BLD AUTO: 0.5 K/UL — SIGNIFICANT CHANGE UP (ref 0–0.9)
MONOCYTES NFR BLD AUTO: 5.8 % — SIGNIFICANT CHANGE UP (ref 2–14)
NEUTROPHILS # BLD AUTO: 5.5 K/UL — SIGNIFICANT CHANGE UP (ref 1.8–7.4)
NEUTROPHILS NFR BLD AUTO: 61.3 % — SIGNIFICANT CHANGE UP (ref 43–77)
PLATELET # BLD AUTO: 278 K/UL — SIGNIFICANT CHANGE UP (ref 150–400)
RBC # BLD: 4.38 M/UL — SIGNIFICANT CHANGE UP (ref 3.8–5.2)
RBC # FLD: 14.4 % — SIGNIFICANT CHANGE UP (ref 10.3–14.5)
WBC # BLD: 9 K/UL — SIGNIFICANT CHANGE UP (ref 3.8–10.5)
WBC # FLD AUTO: 9 K/UL — SIGNIFICANT CHANGE UP (ref 3.8–10.5)

## 2017-11-28 PROCEDURE — 71010: CPT | Mod: 26

## 2017-11-28 PROCEDURE — 99285 EMERGENCY DEPT VISIT HI MDM: CPT | Mod: 25

## 2017-11-28 PROCEDURE — 93010 ELECTROCARDIOGRAM REPORT: CPT

## 2017-11-28 RX ORDER — ONDANSETRON 8 MG/1
4 TABLET, FILM COATED ORAL ONCE
Qty: 0 | Refills: 0 | Status: COMPLETED | OUTPATIENT
Start: 2017-11-28 | End: 2017-11-28

## 2017-11-28 RX ORDER — MORPHINE SULFATE 50 MG/1
4 CAPSULE, EXTENDED RELEASE ORAL ONCE
Qty: 0 | Refills: 0 | Status: DISCONTINUED | OUTPATIENT
Start: 2017-11-28 | End: 2017-11-28

## 2017-11-28 RX ORDER — IPRATROPIUM/ALBUTEROL SULFATE 18-103MCG
3 AEROSOL WITH ADAPTER (GRAM) INHALATION ONCE
Qty: 0 | Refills: 0 | Status: COMPLETED | OUTPATIENT
Start: 2017-11-28 | End: 2017-11-28

## 2017-11-28 RX ADMIN — ONDANSETRON 4 MILLIGRAM(S): 8 TABLET, FILM COATED ORAL at 23:05

## 2017-11-28 RX ADMIN — Medication 3 MILLILITER(S): at 23:05

## 2017-11-28 RX ADMIN — MORPHINE SULFATE 4 MILLIGRAM(S): 50 CAPSULE, EXTENDED RELEASE ORAL at 23:05

## 2017-11-28 NOTE — ED ADULT TRIAGE NOTE - CHIEF COMPLAINT QUOTE
bilateral leg pain, general back pain, and suicidal ideation per EMS reported by [employer], patient denies. Patient reports history of "spinal spurs." Denies traumaTook neurontin for pain with no symptom relief.

## 2017-11-28 NOTE — ED PROVIDER NOTE - MEDICAL DECISION MAKING DETAILS
Pt with exacerbation of her chronic lower extremity neuropathy Will treat, evaluate, electrolytes, and dispo. Pt improved after 2 doses of pain medication.  Ambulatory in the ER wo difficulty.  Stable for DC to [facility]

## 2017-11-28 NOTE — ED PROVIDER NOTE - MUSCULOSKELETAL, MLM
Spine appears normal, range of motion is not limited.  (+) TTP along b/l le wo any rash, lesions or edema.  Distal NVI with cap refill < 2 sec.  2 pt disc intact.

## 2017-11-28 NOTE — ED PROVIDER NOTE - OBJECTIVE STATEMENT
56 y/o female with PMHx of COPD, DM, neuropathy, lung CA presents to the ED c/o leg pain since 3:00 AM today. Pt reports similar sx in the past, but much worse today. Pt takes Neurontin for pain. Bone spurs in spine. Pt also reports HA since she had cataract surgery. Allergic to Dilaudid. Current smoker, 1/2 ppd.

## 2017-11-28 NOTE — ED ADULT NURSE NOTE - OBJECTIVE STATEMENT
Pt presented to ED c/o BLE lower leg pain. As per pt, pt has hx of neuropathy and pain exacerbated 0300 am last night and gotten worse. Pt c/o severe back pain. Hx of "spinal spurs" as per pt. Pt took Neurontin with no relief. Pt added difficulty breathing. O2 sat 95% on RA. Hx of COPD. Minimal wheezes in B/L lobes. EMS reported of pt being suicidal by [facility]. Pt denies SI.  1:1 initiated upon arrival and immediately D/C'd. No other complains noted at this time.

## 2017-11-28 NOTE — ED PROVIDER NOTE - CONSTITUTIONAL, MLM
normal... Adult female , disheveled, well nourished, awake, alert, oriented to person, place, time/situation and in no apparent distress.

## 2017-11-28 NOTE — ED PROVIDER NOTE - RESPIRATORY, MLM
Breath sounds clear and equal bilaterally with min end exp wheezing, no accessory muscle use, no retractions

## 2017-11-28 NOTE — ED ADULT NURSE NOTE - CHPI ED SYMPTOMS NEG
no decreased eating/drinking/no chills/no fever/no numbness/no nausea/no tingling/no weakness/no vomiting/no dizziness

## 2017-11-29 ENCOUNTER — EMERGENCY (EMERGENCY)
Facility: HOSPITAL | Age: 57
LOS: 1 days | Discharge: HOME | End: 2017-11-29
Attending: EMERGENCY MEDICINE
Payer: MEDICAID

## 2017-11-29 VITALS
HEIGHT: 65 IN | OXYGEN SATURATION: 92 % | HEART RATE: 96 BPM | TEMPERATURE: 98 F | WEIGHT: 220.02 LBS | RESPIRATION RATE: 16 BRPM | SYSTOLIC BLOOD PRESSURE: 132 MMHG | DIASTOLIC BLOOD PRESSURE: 67 MMHG

## 2017-11-29 VITALS
OXYGEN SATURATION: 95 % | DIASTOLIC BLOOD PRESSURE: 79 MMHG | RESPIRATION RATE: 18 BRPM | TEMPERATURE: 98 F | SYSTOLIC BLOOD PRESSURE: 139 MMHG | HEART RATE: 78 BPM

## 2017-11-29 VITALS
SYSTOLIC BLOOD PRESSURE: 140 MMHG | OXYGEN SATURATION: 94 % | HEART RATE: 82 BPM | DIASTOLIC BLOOD PRESSURE: 71 MMHG | RESPIRATION RATE: 17 BRPM | TEMPERATURE: 98 F

## 2017-11-29 DIAGNOSIS — E11.9 TYPE 2 DIABETES MELLITUS WITHOUT COMPLICATIONS: ICD-10-CM

## 2017-11-29 DIAGNOSIS — G62.9 POLYNEUROPATHY, UNSPECIFIED: ICD-10-CM

## 2017-11-29 DIAGNOSIS — J44.9 CHRONIC OBSTRUCTIVE PULMONARY DISEASE, UNSPECIFIED: ICD-10-CM

## 2017-11-29 DIAGNOSIS — F17.200 NICOTINE DEPENDENCE, UNSPECIFIED, UNCOMPLICATED: ICD-10-CM

## 2017-11-29 DIAGNOSIS — Z79.02 LONG TERM (CURRENT) USE OF ANTITHROMBOTICS/ANTIPLATELETS: ICD-10-CM

## 2017-11-29 DIAGNOSIS — R94.31 ABNORMAL ELECTROCARDIOGRAM [ECG] [EKG]: ICD-10-CM

## 2017-11-29 DIAGNOSIS — Z85.118 PERSONAL HISTORY OF OTHER MALIGNANT NEOPLASM OF BRONCHUS AND LUNG: ICD-10-CM

## 2017-11-29 DIAGNOSIS — R06.02 SHORTNESS OF BREATH: ICD-10-CM

## 2017-11-29 DIAGNOSIS — Z98.49 CATARACT EXTRACTION STATUS, UNSPECIFIED EYE: Chronic | ICD-10-CM

## 2017-11-29 DIAGNOSIS — Z79.82 LONG TERM (CURRENT) USE OF ASPIRIN: ICD-10-CM

## 2017-11-29 LAB
ALBUMIN SERPL ELPH-MCNC: 3.1 G/DL — LOW (ref 3.3–5)
ALBUMIN SERPL ELPH-MCNC: 3.6 G/DL — SIGNIFICANT CHANGE UP (ref 3.3–5.2)
ALP SERPL-CCNC: 83 U/L — SIGNIFICANT CHANGE UP (ref 40–120)
ALP SERPL-CCNC: 84 U/L — SIGNIFICANT CHANGE UP (ref 40–120)
ALT FLD-CCNC: 14 U/L — SIGNIFICANT CHANGE UP
ALT FLD-CCNC: 19 U/L — SIGNIFICANT CHANGE UP (ref 12–78)
ANION GAP SERPL CALC-SCNC: 17 MMOL/L — SIGNIFICANT CHANGE UP (ref 5–17)
ANION GAP SERPL CALC-SCNC: 8 MMOL/L — SIGNIFICANT CHANGE UP (ref 5–17)
AST SERPL-CCNC: 21 U/L — SIGNIFICANT CHANGE UP
AST SERPL-CCNC: 26 U/L — SIGNIFICANT CHANGE UP (ref 15–37)
BILIRUB SERPL-MCNC: 0.5 MG/DL — SIGNIFICANT CHANGE UP (ref 0.2–1.2)
BILIRUB SERPL-MCNC: 0.5 MG/DL — SIGNIFICANT CHANGE UP (ref 0.4–2)
BUN SERPL-MCNC: 30 MG/DL — HIGH (ref 7–23)
BUN SERPL-MCNC: 34 MG/DL — HIGH (ref 8–20)
CALCIUM SERPL-MCNC: 8.9 MG/DL — SIGNIFICANT CHANGE UP (ref 8.5–10.1)
CALCIUM SERPL-MCNC: 9.5 MG/DL — SIGNIFICANT CHANGE UP (ref 8.6–10.2)
CHLORIDE SERPL-SCNC: 103 MMOL/L — SIGNIFICANT CHANGE UP (ref 98–107)
CHLORIDE SERPL-SCNC: 105 MMOL/L — SIGNIFICANT CHANGE UP (ref 96–108)
CO2 SERPL-SCNC: 24 MMOL/L — SIGNIFICANT CHANGE UP (ref 22–29)
CO2 SERPL-SCNC: 26 MMOL/L — SIGNIFICANT CHANGE UP (ref 22–31)
CREAT SERPL-MCNC: 1.46 MG/DL — HIGH (ref 0.5–1.3)
CREAT SERPL-MCNC: 1.71 MG/DL — HIGH (ref 0.5–1.3)
GLUCOSE SERPL-MCNC: 105 MG/DL — SIGNIFICANT CHANGE UP (ref 70–115)
GLUCOSE SERPL-MCNC: 188 MG/DL — HIGH (ref 70–99)
HCT VFR BLD CALC: 38.5 % — SIGNIFICANT CHANGE UP (ref 37–47)
HGB BLD-MCNC: 12.2 G/DL — SIGNIFICANT CHANGE UP (ref 12–16)
LIDOCAIN IGE QN: 8 U/L — LOW (ref 22–51)
MCHC RBC-ENTMCNC: 26.9 PG — LOW (ref 27–31)
MCHC RBC-ENTMCNC: 31.7 G/DL — LOW (ref 32–36)
MCV RBC AUTO: 85 FL — SIGNIFICANT CHANGE UP (ref 81–99)
PLATELET # BLD AUTO: 311 K/UL — SIGNIFICANT CHANGE UP (ref 150–400)
POTASSIUM SERPL-MCNC: 4.8 MMOL/L — SIGNIFICANT CHANGE UP (ref 3.5–5.3)
POTASSIUM SERPL-MCNC: 5 MMOL/L — SIGNIFICANT CHANGE UP (ref 3.5–5.3)
POTASSIUM SERPL-SCNC: 4.8 MMOL/L — SIGNIFICANT CHANGE UP (ref 3.5–5.3)
POTASSIUM SERPL-SCNC: 5 MMOL/L — SIGNIFICANT CHANGE UP (ref 3.5–5.3)
PROT SERPL-MCNC: 6.4 GM/DL — SIGNIFICANT CHANGE UP (ref 6–8.3)
PROT SERPL-MCNC: 6.5 G/DL — LOW (ref 6.6–8.7)
RBC # BLD: 4.53 M/UL — SIGNIFICANT CHANGE UP (ref 4.4–5.2)
RBC # FLD: 15.3 % — SIGNIFICANT CHANGE UP (ref 11–15.6)
SODIUM SERPL-SCNC: 139 MMOL/L — SIGNIFICANT CHANGE UP (ref 135–145)
SODIUM SERPL-SCNC: 144 MMOL/L — SIGNIFICANT CHANGE UP (ref 135–145)
WBC # BLD: 9 K/UL — SIGNIFICANT CHANGE UP (ref 4.8–10.8)
WBC # FLD AUTO: 9 K/UL — SIGNIFICANT CHANGE UP (ref 4.8–10.8)

## 2017-11-29 PROCEDURE — 80053 COMPREHEN METABOLIC PANEL: CPT

## 2017-11-29 PROCEDURE — 83690 ASSAY OF LIPASE: CPT

## 2017-11-29 PROCEDURE — 99284 EMERGENCY DEPT VISIT MOD MDM: CPT | Mod: 25

## 2017-11-29 PROCEDURE — 85027 COMPLETE CBC AUTOMATED: CPT

## 2017-11-29 PROCEDURE — 96375 TX/PRO/DX INJ NEW DRUG ADDON: CPT

## 2017-11-29 PROCEDURE — 99284 EMERGENCY DEPT VISIT MOD MDM: CPT

## 2017-11-29 PROCEDURE — 96374 THER/PROPH/DIAG INJ IV PUSH: CPT

## 2017-11-29 PROCEDURE — 36415 COLL VENOUS BLD VENIPUNCTURE: CPT

## 2017-11-29 RX ORDER — FAMOTIDINE 10 MG/ML
20 INJECTION INTRAVENOUS ONCE
Qty: 0 | Refills: 0 | Status: COMPLETED | OUTPATIENT
Start: 2017-11-29 | End: 2017-11-29

## 2017-11-29 RX ORDER — MORPHINE SULFATE 50 MG/1
4 CAPSULE, EXTENDED RELEASE ORAL ONCE
Qty: 0 | Refills: 0 | Status: DISCONTINUED | OUTPATIENT
Start: 2017-11-29 | End: 2017-11-29

## 2017-11-29 RX ORDER — METOCLOPRAMIDE HCL 10 MG
10 TABLET ORAL ONCE
Qty: 0 | Refills: 0 | Status: COMPLETED | OUTPATIENT
Start: 2017-11-29 | End: 2017-11-29

## 2017-11-29 RX ORDER — SODIUM CHLORIDE 9 MG/ML
3 INJECTION INTRAMUSCULAR; INTRAVENOUS; SUBCUTANEOUS ONCE
Qty: 0 | Refills: 0 | Status: COMPLETED | OUTPATIENT
Start: 2017-11-29 | End: 2017-11-29

## 2017-11-29 RX ORDER — SODIUM CHLORIDE 9 MG/ML
1000 INJECTION INTRAMUSCULAR; INTRAVENOUS; SUBCUTANEOUS
Qty: 0 | Refills: 0 | Status: DISCONTINUED | OUTPATIENT
Start: 2017-11-29 | End: 2017-12-03

## 2017-11-29 RX ORDER — ACETAMINOPHEN 500 MG
650 TABLET ORAL ONCE
Qty: 0 | Refills: 0 | Status: COMPLETED | OUTPATIENT
Start: 2017-11-29 | End: 2017-11-29

## 2017-11-29 RX ADMIN — MORPHINE SULFATE 4 MILLIGRAM(S): 50 CAPSULE, EXTENDED RELEASE ORAL at 00:30

## 2017-11-29 RX ADMIN — SODIUM CHLORIDE 125 MILLILITER(S): 9 INJECTION INTRAMUSCULAR; INTRAVENOUS; SUBCUTANEOUS at 17:46

## 2017-11-29 RX ADMIN — SODIUM CHLORIDE 3 MILLILITER(S): 9 INJECTION INTRAMUSCULAR; INTRAVENOUS; SUBCUTANEOUS at 17:47

## 2017-11-29 RX ADMIN — FAMOTIDINE 20 MILLIGRAM(S): 10 INJECTION INTRAVENOUS at 17:46

## 2017-11-29 RX ADMIN — Medication 650 MILLIGRAM(S): at 17:46

## 2017-11-29 RX ADMIN — Medication 10 MILLIGRAM(S): at 17:46

## 2017-11-29 NOTE — CHART NOTE - NSCHARTNOTEFT_GEN_A_CORE
KEVIN Note - pt reports being homeless (was at [address] [facility] - but left yesterday for [facility] no beds left at [facility]. KEVIN contacted [facility] emergency and they assigned pt to [facility] [address] [facility].  Taxi voucher for $10 given to pt - medicaid can not provide transport tonight in this vicinity till 2AM. Pt difficult and crying - deemed not a good idea to wait much longer.

## 2017-11-29 NOTE — ED ADULT TRIAGE NOTE - CHIEF COMPLAINT QUOTE
was seen at [facility] on Tuesday and discharged with c/o nausea and peripheral neuropathy.  was at [facility] today and c/o and same symptoms and called EMS. Denies fevers and chills. Reports HX COPD and currently a smoker. Denies CP or SOB.

## 2017-11-29 NOTE — ED PROVIDER NOTE - CARE PLAN
Principal Discharge DX:	Abdominal pain  Secondary Diagnosis:	Eye pain  Secondary Diagnosis:	Homeless single person

## 2017-11-29 NOTE — ED PROVIDER NOTE - OBJECTIVE STATEMENT
58 YO FEMALE WAS HERE AT [facility] YESTERDAY , DISCHARGED, WENT TO [facility], WAS DISCHARGED FROM [place] THIS MORNING,  AND CAME TO [place]. SHE SAYS SHE HAD CATARACT SURGERY WITH [facility] LAST WEEK. SHE SAYS SHE HAS EYE PAIN FOR A FEW DAYS BUT DID NOT CALL SURGEONS AND DID NOT GET EVALUATED AT [facility] ER. ALSO C/O ABD OMINAL PAIN FOR A FEW DAYS. SAYS SHE IS OFF HER INSULIN. STATES SHE WAS AT A [facility]LTER BUT LEFT TO GO TO HOSPITAL. STATES SHE HAS NO WHERE TO GO.  MED HX

## 2017-11-29 NOTE — ED ADULT NURSE NOTE - CHIEF COMPLAINT QUOTE
was seen at [facility] on Tuesday and discharged with c/o nausea and peripheral neuropathy.  was at [facility] today and c/o and same symptoms and called EMS. Denies fevers and chills. Reports HX COPD and currently a smoker. Denies CP or SOB.

## 2017-11-29 NOTE — ED PROVIDER NOTE - MEDICAL DECISION MAKING DETAILS
PT WITH MULTIPLE COMPLAINTS AND NO FINDINGS. THISIS HER THIRD ER VISIT IN 24 HOURS. D/C. SW TO SEE RE [facility]

## 2018-01-20 ENCOUNTER — INPATIENT (INPATIENT)
Facility: HOSPITAL | Age: 58
LOS: 2 days | Discharge: HOME | End: 2018-01-23
Attending: INTERNAL MEDICINE | Admitting: INTERNAL MEDICINE
Payer: MEDICAID

## 2018-01-20 VITALS
TEMPERATURE: 98 F | SYSTOLIC BLOOD PRESSURE: 150 MMHG | WEIGHT: 205.03 LBS | HEART RATE: 90 BPM | OXYGEN SATURATION: 100 % | DIASTOLIC BLOOD PRESSURE: 90 MMHG | HEIGHT: 64 IN | RESPIRATION RATE: 18 BRPM

## 2018-01-20 DIAGNOSIS — Z98.49 CATARACT EXTRACTION STATUS, UNSPECIFIED EYE: Chronic | ICD-10-CM

## 2018-01-20 LAB
ACETONE SERPL-MCNC: (no result)
ALBUMIN SERPL ELPH-MCNC: 3.6 G/DL — SIGNIFICANT CHANGE UP (ref 3.3–5)
ALP SERPL-CCNC: 210 U/L — HIGH (ref 40–120)
ALT FLD-CCNC: 38 U/L — SIGNIFICANT CHANGE UP (ref 12–78)
ANION GAP SERPL CALC-SCNC: 12 MMOL/L — SIGNIFICANT CHANGE UP (ref 5–17)
ANION GAP SERPL CALC-SCNC: 9 MMOL/L — SIGNIFICANT CHANGE UP (ref 5–17)
APAP SERPL-MCNC: < 2 UG/ML — SIGNIFICANT CHANGE UP (ref 10–30)
APTT BLD: 29.7 SEC — SIGNIFICANT CHANGE UP (ref 27.5–37.4)
AST SERPL-CCNC: 27 U/L — SIGNIFICANT CHANGE UP (ref 15–37)
BASE EXCESS BLDV CALC-SCNC: -1.1 MMOL/L — SIGNIFICANT CHANGE UP (ref -2–2)
BASOPHILS # BLD AUTO: 0.1 K/UL — SIGNIFICANT CHANGE UP (ref 0–0.2)
BASOPHILS NFR BLD AUTO: 1.1 % — SIGNIFICANT CHANGE UP (ref 0–2)
BILIRUB SERPL-MCNC: 0.6 MG/DL — SIGNIFICANT CHANGE UP (ref 0.2–1.2)
BLD GP AB SCN SERPL QL: SIGNIFICANT CHANGE UP
BUN SERPL-MCNC: 27 MG/DL — HIGH (ref 7–23)
BUN SERPL-MCNC: 32 MG/DL — HIGH (ref 7–23)
CALCIUM SERPL-MCNC: 8.5 MG/DL — SIGNIFICANT CHANGE UP (ref 8.5–10.1)
CALCIUM SERPL-MCNC: 9.4 MG/DL — SIGNIFICANT CHANGE UP (ref 8.5–10.1)
CHLORIDE SERPL-SCNC: 105 MMOL/L — SIGNIFICANT CHANGE UP (ref 96–108)
CHLORIDE SERPL-SCNC: 96 MMOL/L — SIGNIFICANT CHANGE UP (ref 96–108)
CO2 SERPL-SCNC: 24 MMOL/L — SIGNIFICANT CHANGE UP (ref 22–31)
CO2 SERPL-SCNC: 25 MMOL/L — SIGNIFICANT CHANGE UP (ref 22–31)
CREAT SERPL-MCNC: 1.1 MG/DL — SIGNIFICANT CHANGE UP (ref 0.5–1.3)
CREAT SERPL-MCNC: 1.41 MG/DL — HIGH (ref 0.5–1.3)
EOSINOPHIL # BLD AUTO: 0.2 K/UL — SIGNIFICANT CHANGE UP (ref 0–0.5)
EOSINOPHIL NFR BLD AUTO: 2 % — SIGNIFICANT CHANGE UP (ref 0–6)
ETHANOL SERPL-MCNC: <10 MG/DL — SIGNIFICANT CHANGE UP (ref 0–10)
GLUCOSE BLDC GLUCOMTR-MCNC: 215 MG/DL — HIGH (ref 70–99)
GLUCOSE BLDC GLUCOMTR-MCNC: 222 MG/DL — HIGH (ref 70–99)
GLUCOSE BLDC GLUCOMTR-MCNC: 283 MG/DL — HIGH (ref 70–99)
GLUCOSE BLDC GLUCOMTR-MCNC: 299 MG/DL — HIGH (ref 70–99)
GLUCOSE BLDC GLUCOMTR-MCNC: 374 MG/DL — HIGH (ref 70–99)
GLUCOSE BLDC GLUCOMTR-MCNC: 398 MG/DL — HIGH (ref 70–99)
GLUCOSE SERPL-MCNC: 271 MG/DL — HIGH (ref 70–99)
GLUCOSE SERPL-MCNC: 491 MG/DL — CRITICAL HIGH (ref 70–99)
HCO3 BLDV-SCNC: 24 MMOL/L — SIGNIFICANT CHANGE UP (ref 21–29)
HCT VFR BLD CALC: 44.1 % — SIGNIFICANT CHANGE UP (ref 34.5–45)
HGB BLD-MCNC: 14.2 G/DL — SIGNIFICANT CHANGE UP (ref 11.5–15.5)
INR BLD: 1.04 RATIO — SIGNIFICANT CHANGE UP (ref 0.88–1.16)
LACTATE SERPL-SCNC: 1.5 MMOL/L — SIGNIFICANT CHANGE UP (ref 0.7–2)
LIDOCAIN IGE QN: 45 U/L — LOW (ref 73–393)
LYMPHOCYTES # BLD AUTO: 1.4 K/UL — SIGNIFICANT CHANGE UP (ref 1–3.3)
LYMPHOCYTES # BLD AUTO: 12.6 % — LOW (ref 13–44)
MAGNESIUM SERPL-MCNC: 1.6 MG/DL — SIGNIFICANT CHANGE UP (ref 1.6–2.6)
MCHC RBC-ENTMCNC: 27.5 PG — SIGNIFICANT CHANGE UP (ref 27–34)
MCHC RBC-ENTMCNC: 32.1 GM/DL — SIGNIFICANT CHANGE UP (ref 32–36)
MCV RBC AUTO: 85.7 FL — SIGNIFICANT CHANGE UP (ref 80–100)
MONOCYTES # BLD AUTO: 0.4 K/UL — SIGNIFICANT CHANGE UP (ref 0–0.9)
MONOCYTES NFR BLD AUTO: 3.8 % — SIGNIFICANT CHANGE UP (ref 2–14)
NEUTROPHILS # BLD AUTO: 8.7 K/UL — HIGH (ref 1.8–7.4)
NEUTROPHILS NFR BLD AUTO: 80.4 % — HIGH (ref 43–77)
PCO2 BLDV: 42 MMHG — SIGNIFICANT CHANGE UP (ref 35–50)
PH BLDV: 7.37 — SIGNIFICANT CHANGE UP (ref 7.35–7.45)
PLATELET # BLD AUTO: 287 K/UL — SIGNIFICANT CHANGE UP (ref 150–400)
PO2 BLDV: 107 MMHG — HIGH (ref 25–45)
POTASSIUM SERPL-MCNC: 4 MMOL/L — SIGNIFICANT CHANGE UP (ref 3.5–5.3)
POTASSIUM SERPL-MCNC: 4.6 MMOL/L — SIGNIFICANT CHANGE UP (ref 3.5–5.3)
POTASSIUM SERPL-SCNC: 4 MMOL/L — SIGNIFICANT CHANGE UP (ref 3.5–5.3)
POTASSIUM SERPL-SCNC: 4.6 MMOL/L — SIGNIFICANT CHANGE UP (ref 3.5–5.3)
PROT SERPL-MCNC: 7.6 GM/DL — SIGNIFICANT CHANGE UP (ref 6–8.3)
PROTHROM AB SERPL-ACNC: 11.2 SEC — SIGNIFICANT CHANGE UP (ref 9.8–12.7)
RAPID RVP RESULT: SIGNIFICANT CHANGE UP
RBC # BLD: 5.14 M/UL — SIGNIFICANT CHANGE UP (ref 3.8–5.2)
RBC # FLD: 14.4 % — SIGNIFICANT CHANGE UP (ref 10.3–14.5)
SALICYLATES SERPL-MCNC: 4.3 MG/DL — SIGNIFICANT CHANGE UP (ref 2.8–20)
SAO2 % BLDV: 97 % — HIGH (ref 67–88)
SODIUM SERPL-SCNC: 132 MMOL/L — LOW (ref 135–145)
SODIUM SERPL-SCNC: 139 MMOL/L — SIGNIFICANT CHANGE UP (ref 135–145)
TROPONIN I SERPL-MCNC: <0.015 NG/ML — SIGNIFICANT CHANGE UP (ref 0.01–0.04)
TYPE + AB SCN PNL BLD: SIGNIFICANT CHANGE UP
WBC # BLD: 10.8 K/UL — HIGH (ref 3.8–10.5)
WBC # FLD AUTO: 10.8 K/UL — HIGH (ref 3.8–10.5)

## 2018-01-20 PROCEDURE — 71045 X-RAY EXAM CHEST 1 VIEW: CPT | Mod: 26

## 2018-01-20 PROCEDURE — 99285 EMERGENCY DEPT VISIT HI MDM: CPT

## 2018-01-20 PROCEDURE — 93010 ELECTROCARDIOGRAM REPORT: CPT

## 2018-01-20 RX ORDER — BUDESONIDE AND FORMOTEROL FUMARATE DIHYDRATE 160; 4.5 UG/1; UG/1
2 AEROSOL RESPIRATORY (INHALATION)
Qty: 0 | Refills: 0 | Status: DISCONTINUED | OUTPATIENT
Start: 2018-01-20 | End: 2018-01-23

## 2018-01-20 RX ORDER — ATORVASTATIN CALCIUM 80 MG/1
40 TABLET, FILM COATED ORAL AT BEDTIME
Qty: 0 | Refills: 0 | Status: DISCONTINUED | OUTPATIENT
Start: 2018-01-20 | End: 2018-01-23

## 2018-01-20 RX ORDER — MONTELUKAST 4 MG/1
10 TABLET, CHEWABLE ORAL DAILY
Qty: 0 | Refills: 0 | Status: DISCONTINUED | OUTPATIENT
Start: 2018-01-20 | End: 2018-01-23

## 2018-01-20 RX ORDER — SODIUM CHLORIDE 9 MG/ML
1000 INJECTION INTRAMUSCULAR; INTRAVENOUS; SUBCUTANEOUS
Qty: 0 | Refills: 0 | Status: DISCONTINUED | OUTPATIENT
Start: 2018-01-20 | End: 2018-01-21

## 2018-01-20 RX ORDER — PROCHLORPERAZINE MALEATE 5 MG
5 TABLET ORAL EVERY 6 HOURS
Qty: 0 | Refills: 0 | Status: DISCONTINUED | OUTPATIENT
Start: 2018-01-20 | End: 2018-01-20

## 2018-01-20 RX ORDER — PANTOPRAZOLE SODIUM 20 MG/1
40 TABLET, DELAYED RELEASE ORAL
Qty: 0 | Refills: 0 | Status: DISCONTINUED | OUTPATIENT
Start: 2018-01-20 | End: 2018-01-23

## 2018-01-20 RX ORDER — INSULIN GLARGINE 100 [IU]/ML
30 INJECTION, SOLUTION SUBCUTANEOUS EVERY MORNING
Qty: 0 | Refills: 0 | Status: DISCONTINUED | OUTPATIENT
Start: 2018-01-20 | End: 2018-01-21

## 2018-01-20 RX ORDER — CLOPIDOGREL BISULFATE 75 MG/1
75 TABLET, FILM COATED ORAL DAILY
Qty: 0 | Refills: 0 | Status: DISCONTINUED | OUTPATIENT
Start: 2018-01-20 | End: 2018-01-23

## 2018-01-20 RX ORDER — INSULIN LISPRO 100/ML
VIAL (ML) SUBCUTANEOUS
Qty: 0 | Refills: 0 | Status: DISCONTINUED | OUTPATIENT
Start: 2018-01-20 | End: 2018-01-23

## 2018-01-20 RX ORDER — METOPROLOL TARTRATE 50 MG
25 TABLET ORAL DAILY
Qty: 0 | Refills: 0 | Status: DISCONTINUED | OUTPATIENT
Start: 2018-01-20 | End: 2018-01-23

## 2018-01-20 RX ORDER — PROCHLORPERAZINE MALEATE 5 MG
5 TABLET ORAL EVERY 6 HOURS
Qty: 0 | Refills: 0 | Status: DISCONTINUED | OUTPATIENT
Start: 2018-01-20 | End: 2018-01-23

## 2018-01-20 RX ORDER — ACETAMINOPHEN 500 MG
650 TABLET ORAL EVERY 6 HOURS
Qty: 0 | Refills: 0 | Status: DISCONTINUED | OUTPATIENT
Start: 2018-01-20 | End: 2018-01-23

## 2018-01-20 RX ORDER — DEXTROSE 50 % IN WATER 50 %
12.5 SYRINGE (ML) INTRAVENOUS ONCE
Qty: 0 | Refills: 0 | Status: DISCONTINUED | OUTPATIENT
Start: 2018-01-20 | End: 2018-01-23

## 2018-01-20 RX ORDER — INSULIN GLARGINE 100 [IU]/ML
10 INJECTION, SOLUTION SUBCUTANEOUS ONCE
Qty: 0 | Refills: 0 | Status: DISCONTINUED | OUTPATIENT
Start: 2018-01-20 | End: 2018-01-20

## 2018-01-20 RX ORDER — SODIUM CHLORIDE 9 MG/ML
1000 INJECTION, SOLUTION INTRAVENOUS
Qty: 0 | Refills: 0 | Status: DISCONTINUED | OUTPATIENT
Start: 2018-01-20 | End: 2018-01-23

## 2018-01-20 RX ORDER — SODIUM CHLORIDE 9 MG/ML
1000 INJECTION INTRAMUSCULAR; INTRAVENOUS; SUBCUTANEOUS ONCE
Qty: 0 | Refills: 0 | Status: COMPLETED | OUTPATIENT
Start: 2018-01-20 | End: 2018-01-20

## 2018-01-20 RX ORDER — DEXTROSE 50 % IN WATER 50 %
1 SYRINGE (ML) INTRAVENOUS ONCE
Qty: 0 | Refills: 0 | Status: DISCONTINUED | OUTPATIENT
Start: 2018-01-20 | End: 2018-01-23

## 2018-01-20 RX ORDER — INSULIN HUMAN 100 [IU]/ML
9 INJECTION, SOLUTION SUBCUTANEOUS ONCE
Qty: 0 | Refills: 0 | Status: DISCONTINUED | OUTPATIENT
Start: 2018-01-20 | End: 2018-01-20

## 2018-01-20 RX ORDER — DIPHENHYDRAMINE HCL 50 MG
25 CAPSULE ORAL ONCE
Qty: 0 | Refills: 0 | Status: COMPLETED | OUTPATIENT
Start: 2018-01-20 | End: 2018-01-20

## 2018-01-20 RX ORDER — TIOTROPIUM BROMIDE 18 UG/1
1 CAPSULE ORAL; RESPIRATORY (INHALATION) DAILY
Qty: 0 | Refills: 0 | Status: DISCONTINUED | OUTPATIENT
Start: 2018-01-20 | End: 2018-01-23

## 2018-01-20 RX ORDER — DEXTROSE 50 % IN WATER 50 %
25 SYRINGE (ML) INTRAVENOUS ONCE
Qty: 0 | Refills: 0 | Status: DISCONTINUED | OUTPATIENT
Start: 2018-01-20 | End: 2018-01-23

## 2018-01-20 RX ORDER — METOCLOPRAMIDE HCL 10 MG
10 TABLET ORAL ONCE
Qty: 0 | Refills: 0 | Status: COMPLETED | OUTPATIENT
Start: 2018-01-20 | End: 2018-01-20

## 2018-01-20 RX ORDER — ONDANSETRON 8 MG/1
4 TABLET, FILM COATED ORAL EVERY 6 HOURS
Qty: 0 | Refills: 0 | Status: DISCONTINUED | OUTPATIENT
Start: 2018-01-20 | End: 2018-01-23

## 2018-01-20 RX ORDER — SODIUM CHLORIDE 9 MG/ML
2000 INJECTION INTRAMUSCULAR; INTRAVENOUS; SUBCUTANEOUS ONCE
Qty: 0 | Refills: 0 | Status: COMPLETED | OUTPATIENT
Start: 2018-01-20 | End: 2018-01-20

## 2018-01-20 RX ORDER — ALBUTEROL 90 UG/1
2 AEROSOL, METERED ORAL EVERY 6 HOURS
Qty: 0 | Refills: 0 | Status: DISCONTINUED | OUTPATIENT
Start: 2018-01-20 | End: 2018-01-23

## 2018-01-20 RX ORDER — OXYBUTYNIN CHLORIDE 5 MG
5 TABLET ORAL
Qty: 0 | Refills: 0 | Status: DISCONTINUED | OUTPATIENT
Start: 2018-01-20 | End: 2018-01-23

## 2018-01-20 RX ORDER — QUETIAPINE FUMARATE 200 MG/1
50 TABLET, FILM COATED ORAL AT BEDTIME
Qty: 0 | Refills: 0 | Status: DISCONTINUED | OUTPATIENT
Start: 2018-01-20 | End: 2018-01-21

## 2018-01-20 RX ORDER — INSULIN LISPRO 100/ML
10 VIAL (ML) SUBCUTANEOUS ONCE
Qty: 0 | Refills: 0 | Status: COMPLETED | OUTPATIENT
Start: 2018-01-20 | End: 2018-01-20

## 2018-01-20 RX ORDER — INSULIN GLARGINE 100 [IU]/ML
30 INJECTION, SOLUTION SUBCUTANEOUS AT BEDTIME
Qty: 0 | Refills: 0 | Status: DISCONTINUED | OUTPATIENT
Start: 2018-01-20 | End: 2018-01-21

## 2018-01-20 RX ORDER — INSULIN HUMAN 100 [IU]/ML
9 INJECTION, SOLUTION SUBCUTANEOUS ONCE
Qty: 0 | Refills: 0 | Status: COMPLETED | OUTPATIENT
Start: 2018-01-20 | End: 2018-01-20

## 2018-01-20 RX ORDER — GLUCAGON INJECTION, SOLUTION 0.5 MG/.1ML
1 INJECTION, SOLUTION SUBCUTANEOUS ONCE
Qty: 0 | Refills: 0 | Status: DISCONTINUED | OUTPATIENT
Start: 2018-01-20 | End: 2018-01-23

## 2018-01-20 RX ORDER — INSULIN GLARGINE 100 [IU]/ML
40 INJECTION, SOLUTION SUBCUTANEOUS ONCE
Qty: 0 | Refills: 0 | Status: COMPLETED | OUTPATIENT
Start: 2018-01-20 | End: 2018-01-20

## 2018-01-20 RX ORDER — ONDANSETRON 8 MG/1
8 TABLET, FILM COATED ORAL ONCE
Qty: 0 | Refills: 0 | Status: COMPLETED | OUTPATIENT
Start: 2018-01-20 | End: 2018-01-20

## 2018-01-20 RX ORDER — INSULIN LISPRO 100/ML
VIAL (ML) SUBCUTANEOUS AT BEDTIME
Qty: 0 | Refills: 0 | Status: DISCONTINUED | OUTPATIENT
Start: 2018-01-20 | End: 2018-01-23

## 2018-01-20 RX ORDER — PROCHLORPERAZINE MALEATE 5 MG
10 TABLET ORAL ONCE
Qty: 0 | Refills: 0 | Status: COMPLETED | OUTPATIENT
Start: 2018-01-20 | End: 2018-01-20

## 2018-01-20 RX ORDER — ASPIRIN/CALCIUM CARB/MAGNESIUM 324 MG
81 TABLET ORAL DAILY
Qty: 0 | Refills: 0 | Status: DISCONTINUED | OUTPATIENT
Start: 2018-01-20 | End: 2018-01-23

## 2018-01-20 RX ADMIN — SODIUM CHLORIDE 1000 MILLILITER(S): 9 INJECTION INTRAMUSCULAR; INTRAVENOUS; SUBCUTANEOUS at 05:45

## 2018-01-20 RX ADMIN — ONDANSETRON 8 MILLIGRAM(S): 8 TABLET, FILM COATED ORAL at 03:59

## 2018-01-20 RX ADMIN — INSULIN GLARGINE 30 UNIT(S): 100 INJECTION, SOLUTION SUBCUTANEOUS at 22:00

## 2018-01-20 RX ADMIN — SODIUM CHLORIDE 75 MILLILITER(S): 9 INJECTION INTRAMUSCULAR; INTRAVENOUS; SUBCUTANEOUS at 11:53

## 2018-01-20 RX ADMIN — Medication 5 MILLIGRAM(S): at 17:57

## 2018-01-20 RX ADMIN — INSULIN GLARGINE 40 UNIT(S): 100 INJECTION, SOLUTION SUBCUTANEOUS at 04:58

## 2018-01-20 RX ADMIN — INSULIN HUMAN 9 UNIT(S): 100 INJECTION, SOLUTION SUBCUTANEOUS at 05:59

## 2018-01-20 RX ADMIN — Medication 81 MILLIGRAM(S): at 13:55

## 2018-01-20 RX ADMIN — Medication: at 14:10

## 2018-01-20 RX ADMIN — Medication 5 MILLIGRAM(S): at 20:20

## 2018-01-20 RX ADMIN — Medication 10 UNIT(S): at 05:59

## 2018-01-20 RX ADMIN — Medication 2: at 22:01

## 2018-01-20 RX ADMIN — Medication 10 MILLIGRAM(S): at 04:47

## 2018-01-20 RX ADMIN — Medication 5 MILLIGRAM(S): at 11:52

## 2018-01-20 RX ADMIN — MONTELUKAST 10 MILLIGRAM(S): 4 TABLET, CHEWABLE ORAL at 13:54

## 2018-01-20 RX ADMIN — CLOPIDOGREL BISULFATE 75 MILLIGRAM(S): 75 TABLET, FILM COATED ORAL at 13:55

## 2018-01-20 RX ADMIN — PANTOPRAZOLE SODIUM 40 MILLIGRAM(S): 20 TABLET, DELAYED RELEASE ORAL at 18:01

## 2018-01-20 RX ADMIN — Medication 10 MILLIGRAM(S): at 05:15

## 2018-01-20 RX ADMIN — Medication 25 MILLIGRAM(S): at 04:45

## 2018-01-20 RX ADMIN — Medication 4: at 17:55

## 2018-01-20 RX ADMIN — ATORVASTATIN CALCIUM 40 MILLIGRAM(S): 80 TABLET, FILM COATED ORAL at 22:00

## 2018-01-20 RX ADMIN — SODIUM CHLORIDE 2000 MILLILITER(S): 9 INJECTION INTRAMUSCULAR; INTRAVENOUS; SUBCUTANEOUS at 03:45

## 2018-01-20 RX ADMIN — Medication 30 MILLILITER(S): at 21:55

## 2018-01-20 RX ADMIN — Medication 25 MILLIGRAM(S): at 13:54

## 2018-01-20 NOTE — ED PROVIDER NOTE - MEDICAL DECISION MAKING DETAILS
Check labs to r/o DKA, give ivfs and insulin.  If patient improves she may be able to return to [facility].  If glucose does not improve or patient unable to tolerate PO, she may need admission.

## 2018-01-20 NOTE — ED ADULT NURSE NOTE - CHPI ED SYMPTOMS NEG
no burning urination/no hematuria/no dysuria/no diarrhea/no fever/no chills/no blood in stool/no abdominal distension

## 2018-01-20 NOTE — H&P ADULT - NSHPREVIEWOFSYSTEMS_GEN_ALL_CORE
General:  + weakness; no fevers or chills  HEENT: No HA, neck pain, no visual changes, no hearing loss   Resp:  +cough, nonproductive;  no wheezing, no sob  CV: No chest pain or palpitations  GI: intermittent lower abd pain and n/v; no hematemesis; no diarrhea; no blood in stool  : No f/u/d  Neuro: No numbness or tingling  MSK: No myalgias, arthralgias  SKIN: No itching, rashes, lesions  All other ROS negative unless indicated above.

## 2018-01-20 NOTE — H&P ADULT - NSHPLABSRESULTS_GEN_ALL_CORE
14.2   10.8  )-----------( 287      ( 20 Jan 2018 03:30 )             44.1     01-20    139  |  105  |  27<H>  ----------------------------<  271<H>  4.0   |  25  |  1.10    Ca    8.5      20 Jan 2018 09:15  Mg     1.6     01-20    TPro  7.6  /  Alb  3.6  /  TBili  0.6  /  DBili  x   /  AST  27  /  ALT  38  /  AlkPhos  210<H>  01-20    PT/INR - ( 20 Jan 2018 03:30 )   PT: 11.2 sec;   INR: 1.04 ratio         PTT - ( 20 Jan 2018 03:30 )  PTT:29.7 sec  CARDIAC MARKERS ( 20 Jan 2018 03:30 )  <0.015 ng/mL / x     / x     / x     / x            < from: Xray Chest 1 View AP/PA. (01.20.18 @ 04:13) >      Impression: No active pulmonary disease.    < end of copied text >    MEDICATIONS  (STANDING):  aspirin enteric coated 81 milliGRAM(s) Oral daily  atorvastatin 40 milliGRAM(s) Oral at bedtime  buDESOnide 160 MICROgram(s)/formoterol 4.5 MICROgram(s) Inhaler 2 Puff(s) Inhalation two times a day  clopidogrel Tablet 75 milliGRAM(s) Oral daily  dextrose 5%. 1000 milliLiter(s) (50 mL/Hr) IV Continuous <Continuous>  dextrose 50% Injectable 12.5 Gram(s) IV Push once  dextrose 50% Injectable 25 Gram(s) IV Push once  dextrose 50% Injectable 25 Gram(s) IV Push once  insulin glargine Injectable (LANTUS) 30 Unit(s) SubCutaneous every morning  insulin glargine Injectable (LANTUS) 30 Unit(s) SubCutaneous at bedtime  insulin lispro (HumaLOG) corrective regimen sliding scale   SubCutaneous three times a day before meals  insulin lispro (HumaLOG) corrective regimen sliding scale   SubCutaneous at bedtime  metoprolol succinate ER 25 milliGRAM(s) Oral daily  montelukast 10 milliGRAM(s) Oral daily  oxybutynin 5 milliGRAM(s) Oral four times a day  pantoprazole    Tablet 40 milliGRAM(s) Oral before breakfast  QUEtiapine 50 milliGRAM(s) Oral at bedtime  sodium chloride 0.9%. 1000 milliLiter(s) (75 mL/Hr) IV Continuous <Continuous>  tiotropium 18 MICROgram(s) Capsule 1 Capsule(s) Inhalation daily    MEDICATIONS  (PRN):  acetaminophen   Tablet 650 milliGRAM(s) Oral every 6 hours PRN For Temp greater than 38 C (100.4 F)  ALBUTerol    90 MICROgram(s) HFA Inhaler 2 Puff(s) Inhalation every 6 hours PRN Shortness of Breath and/or Wheezing  dextrose Gel 1 Dose(s) Oral once PRN Blood Glucose LESS THAN 70 milliGRAM(s)/deciliter  glucagon  Injectable 1 milliGRAM(s) IntraMuscular once PRN Glucose LESS THAN 70 milligrams/deciliter  ondansetron Injectable 4 milliGRAM(s) IV Push every 6 hours PRN Nausea and/or Vomiting  prochlorperazine   Injectable 5 milliGRAM(s) IV Push every 6 hours PRN nausea/vomitting

## 2018-01-20 NOTE — H&P ADULT - HISTORY OF PRESENT ILLNESS
57y female w/ PMH COPD (no home O2), lung cancer, IDDM, CAD s/p CABG presents from [facility] shelter w/ nausea, vomiting and cough for 3 days.  Pt reports having multiple sick contacts at shelter, some possibly w/ flu.  Cough is nonproductive.  Nausea and vomiting  is intermittent and this happens occasionally w/ her diabetic gastroparesis and is relieved w/ compazine.  She also c/o intermittent lower abdominal discomfort.  Denies diarrhea. No urinary f/u/d.

## 2018-01-20 NOTE — ED ADULT NURSE REASSESSMENT NOTE - NS ED NURSE REASSESS COMMENT FT1
0400: Patient's nausea unrelieved by MD Kiera Meza updated. Patient still having retching.   0530: Post Compazine, patient reports that her nausea has improved. MD Qureshi updated.   0630: Nurse Rounding: Patient is sleeping at time of rounding, no apparent distress, complaints, or comfort measures needed at this time. Patient to be admitted, awaiting hospitalist MD admission assessment. Will continue to monitor/reassess and ensure comfort/safety.

## 2018-01-20 NOTE — ED PROVIDER NOTE - CONSTITUTIONAL, MLM
normal... Well appearing, well nourished, awake, alert, oriented to person, place, time/situation and dry heaving into a bag.

## 2018-01-20 NOTE — ED PROVIDER NOTE - CARE PLAN
Principal Discharge DX:	Diabetic gastroparesis associated with type 1 diabetes mellitus  Secondary Diagnosis:	Hyperglycemia due to type 1 diabetes mellitus

## 2018-01-20 NOTE — ED PROVIDER NOTE - PSYCHIATRIC, MLM
Alert and oriented to person, place, time/situation. normal mood and affect. no apparent risk to self or others.  +agitated

## 2018-01-20 NOTE — H&P ADULT - ASSESSMENT
57y female w/     *n/v, cough  - n/v possibly due to diabetic gastroparesis/hx GERD/hiatal hernia  - compazine prn, IVF, diabetic diet if tolerates  - PPI  - check rvp r/o flu   - cxr no infiltrate  - UA pending    *COPD  - stable  - continue home meds    *IDDM, hyperglycemia   - likely due to noncompliance   - check A1C   - diabetic diet, ISS, lantus    *CAD hx  - stable, continue home meds     *dvt px  - scds, ambulate

## 2018-01-20 NOTE — ED ADULT NURSE NOTE - OBJECTIVE STATEMENT
57 year old female presenting to the ED via EMS complaining of vomiting. Patient states that she has had vomiting, nausea, diarrhea, and anorexia for the past four days. Patient reports that she has a history of IDDM, on Lantus and Lispro, has not taken her insulin in the past four days. Patient additionally complaining of full body myalgias, headaches, and dizziness. Patient is retching during assessment, but no vomit output. Patient reports that her pain is improved by lying on her right side. Moist mucosal membranes. Lung sounds clear, abdomen soft/non-tender. Denies urinary signs and symptoms.

## 2018-01-21 LAB
APPEARANCE UR: (no result)
BACTERIA # UR AUTO: (no result)
BILIRUB UR-MCNC: NEGATIVE — SIGNIFICANT CHANGE UP
COLOR SPEC: YELLOW — SIGNIFICANT CHANGE UP
DIFF PNL FLD: (no result)
EPI CELLS # UR: SIGNIFICANT CHANGE UP
GLUCOSE BLDC GLUCOMTR-MCNC: 262 MG/DL — HIGH (ref 70–99)
GLUCOSE BLDC GLUCOMTR-MCNC: 301 MG/DL — HIGH (ref 70–99)
GLUCOSE BLDC GLUCOMTR-MCNC: 316 MG/DL — HIGH (ref 70–99)
GLUCOSE BLDC GLUCOMTR-MCNC: 399 MG/DL — HIGH (ref 70–99)
GLUCOSE UR QL: 1000 MG/DL
HBA1C BLD-MCNC: 10.8 % — HIGH (ref 4–5.6)
HCT VFR BLD CALC: 35.7 % — SIGNIFICANT CHANGE UP (ref 34.5–45)
HGB BLD-MCNC: 11.6 G/DL — SIGNIFICANT CHANGE UP (ref 11.5–15.5)
KETONES UR-MCNC: (no result)
LEUKOCYTE ESTERASE UR-ACNC: (no result)
MCHC RBC-ENTMCNC: 27.7 PG — SIGNIFICANT CHANGE UP (ref 27–34)
MCHC RBC-ENTMCNC: 32.6 GM/DL — SIGNIFICANT CHANGE UP (ref 32–36)
MCV RBC AUTO: 85 FL — SIGNIFICANT CHANGE UP (ref 80–100)
NITRITE UR-MCNC: NEGATIVE — SIGNIFICANT CHANGE UP
PH UR: 5 — SIGNIFICANT CHANGE UP (ref 5–8)
PLATELET # BLD AUTO: 221 K/UL — SIGNIFICANT CHANGE UP (ref 150–400)
PROT UR-MCNC: 30 MG/DL
RBC # BLD: 4.19 M/UL — SIGNIFICANT CHANGE UP (ref 3.8–5.2)
RBC # FLD: 14.4 % — SIGNIFICANT CHANGE UP (ref 10.3–14.5)
RBC CASTS # UR COMP ASSIST: (no result) /HPF (ref 0–4)
SP GR SPEC: 1.01 — SIGNIFICANT CHANGE UP (ref 1.01–1.02)
UROBILINOGEN FLD QL: NEGATIVE MG/DL — SIGNIFICANT CHANGE UP
WBC # BLD: 8.5 K/UL — SIGNIFICANT CHANGE UP (ref 3.8–10.5)
WBC # FLD AUTO: 8.5 K/UL — SIGNIFICANT CHANGE UP (ref 3.8–10.5)
WBC UR QL: SIGNIFICANT CHANGE UP

## 2018-01-21 RX ORDER — INSULIN GLARGINE 100 [IU]/ML
40 INJECTION, SOLUTION SUBCUTANEOUS EVERY MORNING
Qty: 0 | Refills: 0 | Status: DISCONTINUED | OUTPATIENT
Start: 2018-01-22 | End: 2018-01-23

## 2018-01-21 RX ORDER — TRAZODONE HCL 50 MG
50 TABLET ORAL AT BEDTIME
Qty: 0 | Refills: 0 | Status: DISCONTINUED | OUTPATIENT
Start: 2018-01-21 | End: 2018-01-23

## 2018-01-21 RX ORDER — INSULIN GLARGINE 100 [IU]/ML
40 INJECTION, SOLUTION SUBCUTANEOUS AT BEDTIME
Qty: 0 | Refills: 0 | Status: DISCONTINUED | OUTPATIENT
Start: 2018-01-21 | End: 2018-01-23

## 2018-01-21 RX ORDER — GABAPENTIN 400 MG/1
600 CAPSULE ORAL ONCE
Qty: 0 | Refills: 0 | Status: COMPLETED | OUTPATIENT
Start: 2018-01-21 | End: 2018-01-21

## 2018-01-21 RX ADMIN — Medication 5 MILLIGRAM(S): at 17:20

## 2018-01-21 RX ADMIN — GABAPENTIN 600 MILLIGRAM(S): 400 CAPSULE ORAL at 18:56

## 2018-01-21 RX ADMIN — TIOTROPIUM BROMIDE 1 CAPSULE(S): 18 CAPSULE ORAL; RESPIRATORY (INHALATION) at 08:07

## 2018-01-21 RX ADMIN — INSULIN GLARGINE 40 UNIT(S): 100 INJECTION, SOLUTION SUBCUTANEOUS at 22:37

## 2018-01-21 RX ADMIN — Medication 600 MILLIGRAM(S): at 17:20

## 2018-01-21 RX ADMIN — Medication 5 MILLIGRAM(S): at 12:20

## 2018-01-21 RX ADMIN — Medication 5 MILLIGRAM(S): at 22:36

## 2018-01-21 RX ADMIN — Medication 5 MILLIGRAM(S): at 00:07

## 2018-01-21 RX ADMIN — Medication 8: at 17:21

## 2018-01-21 RX ADMIN — ATORVASTATIN CALCIUM 40 MILLIGRAM(S): 80 TABLET, FILM COATED ORAL at 22:36

## 2018-01-21 RX ADMIN — INSULIN GLARGINE 30 UNIT(S): 100 INJECTION, SOLUTION SUBCUTANEOUS at 10:03

## 2018-01-21 RX ADMIN — CLOPIDOGREL BISULFATE 75 MILLIGRAM(S): 75 TABLET, FILM COATED ORAL at 12:20

## 2018-01-21 RX ADMIN — Medication 6: at 09:30

## 2018-01-21 RX ADMIN — Medication 25 MILLIGRAM(S): at 05:52

## 2018-01-21 RX ADMIN — PANTOPRAZOLE SODIUM 40 MILLIGRAM(S): 20 TABLET, DELAYED RELEASE ORAL at 05:51

## 2018-01-21 RX ADMIN — Medication 81 MILLIGRAM(S): at 12:19

## 2018-01-21 RX ADMIN — Medication 6: at 22:37

## 2018-01-21 RX ADMIN — BUDESONIDE AND FORMOTEROL FUMARATE DIHYDRATE 2 PUFF(S): 160; 4.5 AEROSOL RESPIRATORY (INHALATION) at 20:37

## 2018-01-21 RX ADMIN — MONTELUKAST 10 MILLIGRAM(S): 4 TABLET, CHEWABLE ORAL at 12:19

## 2018-01-21 RX ADMIN — Medication 8: at 12:23

## 2018-01-21 RX ADMIN — Medication 50 MILLIGRAM(S): at 22:36

## 2018-01-21 RX ADMIN — Medication 5 MILLIGRAM(S): at 05:51

## 2018-01-21 RX ADMIN — BUDESONIDE AND FORMOTEROL FUMARATE DIHYDRATE 2 PUFF(S): 160; 4.5 AEROSOL RESPIRATORY (INHALATION) at 08:11

## 2018-01-21 NOTE — PROGRESS NOTE ADULT - SUBJECTIVE AND OBJECTIVE BOX
Chief Complaint/Reason for Admission: n/v, cough x 3 days	  History of Present Illness: 	  57y female w/ PMH COPD (no home O2), lung cancer, IDDM, CAD s/p CABG presents from [facility] w/ nausea, vomiting and cough for 3 days.  Pt reports having multiple sick contacts at shelter, some possibly w/ flu.  Cough is nonproductive.  Nausea and vomiting  is intermittent and this happens occasionally w/ her diabetic gastroparesis and is relieved w/ compazine.  She also c/o intermittent lower abdominal discomfort.  Denies diarrhea. No urinary f/u/d.    1/21- feeling better today.  No abd pain, no n/v.  Ate omelette for breakfast.  Pt states she does not want to return to [facility] b/c they only feed her pasta and pizza.  +cough, chronic and nonproductive.     ros- as per hpi.    Vital Signs Last 24 Hrs  T(C): 36.8 (21 Jan 2018 11:25), Max: 36.9 (20 Jan 2018 17:07)  T(F): 98.3 (21 Jan 2018 11:25), Max: 98.5 (20 Jan 2018 17:07)  HR: 69 (21 Jan 2018 11:25) (60 - 72)  BP: 110/47 (21 Jan 2018 11:25) (108/59 - 134/50)  BP(mean): --  RR: 17 (21 Jan 2018 11:25) (17 - 18)  SpO2: 93% (21 Jan 2018 11:25) (92% - 94%)	    PHYSICAL EXAM:  	General: seated in bed, NAD  	Neuro: AAOx3, no focal deficits  	HEENT: NCAT, MMM  	Neck: Soft and supple, No JVD  	Respiratory: CTA b/l, no w/r/r  	Cardiovascular: S1 and S2, RRR  	Gastrointestinal: +BS, soft, NTND  	Extremities: No c/c/e  	Vascular: 2+ peripheral pulses  	Musculoskeletal: 5/5 strength b/l UE and LE, sensation intact  Skin: No rashes        LABS: All Labs Reviewed:                        11.6   8.5   )-----------( 221      ( 21 Jan 2018 07:52 )             35.7     01-20    139  |  105  |  27<H>  ----------------------------<  271<H>  4.0   |  25  |  1.10    Ca    8.5      20 Jan 2018 09:15  Mg     1.6     01-20    TPro  7.6  /  Alb  3.6  /  TBili  0.6  /  DBili  x   /  AST  27  /  ALT  38  /  AlkPhos  210<H>  01-20    PT/INR - ( 20 Jan 2018 03:30 )   PT: 11.2 sec;   INR: 1.04 ratio         PTT - ( 20 Jan 2018 03:30 )  PTT:29.7 sec  CARDIAC MARKERS ( 20 Jan 2018 03:30 )  <0.015 ng/mL / x     / x     / x     / x        < from: Xray Chest 1 View AP/PA. (01.20.18 @ 04:13) >    Impression: No active pulmonary disease.    < end of copied text >      MEDICATIONS  (STANDING):  aspirin enteric coated 81 milliGRAM(s) Oral daily  atorvastatin 40 milliGRAM(s) Oral at bedtime  buDESOnide 160 MICROgram(s)/formoterol 4.5 MICROgram(s) Inhaler 2 Puff(s) Inhalation two times a day  clopidogrel Tablet 75 milliGRAM(s) Oral daily  dextrose 5%. 1000 milliLiter(s) (50 mL/Hr) IV Continuous <Continuous>  dextrose 50% Injectable 12.5 Gram(s) IV Push once  dextrose 50% Injectable 25 Gram(s) IV Push once  dextrose 50% Injectable 25 Gram(s) IV Push once  insulin glargine Injectable (LANTUS) 30 Unit(s) SubCutaneous every morning  insulin glargine Injectable (LANTUS) 30 Unit(s) SubCutaneous at bedtime  insulin lispro (HumaLOG) corrective regimen sliding scale   SubCutaneous three times a day before meals  insulin lispro (HumaLOG) corrective regimen sliding scale   SubCutaneous at bedtime  metoprolol succinate ER 25 milliGRAM(s) Oral daily  montelukast 10 milliGRAM(s) Oral daily  oxybutynin 5 milliGRAM(s) Oral four times a day  pantoprazole    Tablet 40 milliGRAM(s) Oral before breakfast  QUEtiapine 50 milliGRAM(s) Oral at bedtime  sodium chloride 0.9%. 1000 milliLiter(s) (75 mL/Hr) IV Continuous <Continuous>  tiotropium 18 MICROgram(s) Capsule 1 Capsule(s) Inhalation daily    MEDICATIONS  (PRN):  acetaminophen   Tablet 650 milliGRAM(s) Oral every 6 hours PRN For Temp greater than 38 C (100.4 F)  ALBUTerol    90 MICROgram(s) HFA Inhaler 2 Puff(s) Inhalation every 6 hours PRN Shortness of Breath and/or Wheezing  dextrose Gel 1 Dose(s) Oral once PRN Blood Glucose LESS THAN 70 milliGRAM(s)/deciliter  glucagon  Injectable 1 milliGRAM(s) IntraMuscular once PRN Glucose LESS THAN 70 milligrams/deciliter  ondansetron Injectable 4 milliGRAM(s) IV Push every 6 hours PRN Nausea and/or Vomiting  prochlorperazine   Injectable 5 milliGRAM(s) IV Push every 6 hours PRN nausea/vomitting    Assessment and Plan:   ·		  57y female w/     *n/v, cough  - n/v possibly due to diabetic gastroparesis/hx GERD/hiatal hernia  - compazine prn, IVF, diabetic diet if tolerates  - PPI  - check rvp r/o flu - Negative  - cxr no infiltrate  -1/21- Sx improved, tolerating diet, d/c ivf.  Discussed importance of med and diet compliance.    Cough chronic. Requesting mucinex.     *asymptomatic bacteriuria  - no f/u/d, no fevers, no wbc- no indication for abx     *COPD  - stable  - continue home meds    *IDDM, hyperglycemia - uncontrolled  A1C- 10.8  - due to diet and med noncompliance   - diabetic diet, ISS, lantus    *CAD hx  - stable, continue home meds     *dvt px  - scds, ambulate

## 2018-01-22 ENCOUNTER — TRANSCRIPTION ENCOUNTER (OUTPATIENT)
Age: 58
End: 2018-01-22

## 2018-01-22 LAB
CULTURE RESULTS: SIGNIFICANT CHANGE UP
GLUCOSE BLDC GLUCOMTR-MCNC: 191 MG/DL — HIGH (ref 70–99)
GLUCOSE BLDC GLUCOMTR-MCNC: 192 MG/DL — HIGH (ref 70–99)
GLUCOSE BLDC GLUCOMTR-MCNC: 198 MG/DL — HIGH (ref 70–99)
GLUCOSE BLDC GLUCOMTR-MCNC: 277 MG/DL — HIGH (ref 70–99)
SPECIMEN SOURCE: SIGNIFICANT CHANGE UP

## 2018-01-22 RX ORDER — OXYCODONE HYDROCHLORIDE 5 MG/1
1 TABLET ORAL
Qty: 0 | Refills: 0 | COMMUNITY

## 2018-01-22 RX ADMIN — MONTELUKAST 10 MILLIGRAM(S): 4 TABLET, CHEWABLE ORAL at 12:29

## 2018-01-22 RX ADMIN — ATORVASTATIN CALCIUM 40 MILLIGRAM(S): 80 TABLET, FILM COATED ORAL at 21:21

## 2018-01-22 RX ADMIN — Medication 2: at 12:28

## 2018-01-22 RX ADMIN — Medication 50 MILLIGRAM(S): at 21:21

## 2018-01-22 RX ADMIN — Medication 600 MILLIGRAM(S): at 17:04

## 2018-01-22 RX ADMIN — BUDESONIDE AND FORMOTEROL FUMARATE DIHYDRATE 2 PUFF(S): 160; 4.5 AEROSOL RESPIRATORY (INHALATION) at 19:35

## 2018-01-22 RX ADMIN — BUDESONIDE AND FORMOTEROL FUMARATE DIHYDRATE 2 PUFF(S): 160; 4.5 AEROSOL RESPIRATORY (INHALATION) at 08:25

## 2018-01-22 RX ADMIN — TIOTROPIUM BROMIDE 1 CAPSULE(S): 18 CAPSULE ORAL; RESPIRATORY (INHALATION) at 08:25

## 2018-01-22 RX ADMIN — Medication 5 MILLIGRAM(S): at 05:53

## 2018-01-22 RX ADMIN — CLOPIDOGREL BISULFATE 75 MILLIGRAM(S): 75 TABLET, FILM COATED ORAL at 12:29

## 2018-01-22 RX ADMIN — Medication 600 MILLIGRAM(S): at 05:53

## 2018-01-22 RX ADMIN — Medication 25 MILLIGRAM(S): at 05:53

## 2018-01-22 RX ADMIN — PANTOPRAZOLE SODIUM 40 MILLIGRAM(S): 20 TABLET, DELAYED RELEASE ORAL at 05:53

## 2018-01-22 RX ADMIN — Medication 650 MILLIGRAM(S): at 21:23

## 2018-01-22 RX ADMIN — Medication 5 MILLIGRAM(S): at 17:07

## 2018-01-22 RX ADMIN — INSULIN GLARGINE 40 UNIT(S): 100 INJECTION, SOLUTION SUBCUTANEOUS at 08:20

## 2018-01-22 RX ADMIN — Medication 6: at 08:18

## 2018-01-22 RX ADMIN — Medication 5 MILLIGRAM(S): at 12:29

## 2018-01-22 RX ADMIN — Medication 5 MILLIGRAM(S): at 21:56

## 2018-01-22 RX ADMIN — Medication 2: at 17:03

## 2018-01-22 RX ADMIN — INSULIN GLARGINE 40 UNIT(S): 100 INJECTION, SOLUTION SUBCUTANEOUS at 21:21

## 2018-01-22 RX ADMIN — Medication 81 MILLIGRAM(S): at 12:29

## 2018-01-22 NOTE — DISCHARGE NOTE ADULT - PATIENT PORTAL LINK FT
“You can access the [facility] Patient Portal, offered by [facility], by registering with the following website: [email or web address]”

## 2018-01-22 NOTE — DISCHARGE NOTE ADULT - HOSPITAL COURSE
57y female w/ PMH COPD (no home O2), lung cancer, IDDM, CAD s/p CABG presents from [facility] w/ nausea, vomiting and cough for 3 days.  Pt reports having multiple sick contacts at shelter, some possibly w/ flu.  Cough is nonproductive.  Nausea and vomiting  is intermittent and this happens occasionally w/ her diabetic gastroparesis and is relieved w/ compazine.  She also c/o intermittent lower abdominal discomfort.  Denies diarrhea. No urinary f/u/d.    1/21- feeling better today.  No abd pain, no n/v.  Ate omelette for breakfast.  Pt states she does not want to return to [facility] b/c they only feed her pasta and pizza.  +cough, chronic and nonproductive.   01/22/18: Patient seen and examined. No new complaints. No more cough, nausea, vomiting. Tolerating diet well.       Vital Signs Last 24 Hrs  T(C): 36.9 (22 Jan 2018 11:54), Max: 37.3 (21 Jan 2018 16:56)  T(F): 98.5 (22 Jan 2018 11:54), Max: 99.1 (21 Jan 2018 16:56)  HR: 65 (22 Jan 2018 11:54) (60 - 67)  BP: 142/61 (22 Jan 2018 11:54) (127/35 - 142/61)  BP(mean): --  RR: 18 (22 Jan 2018 11:54) (18 - 18)  SpO2: 95% (22 Jan 2018 11:54) (95% - 95%)    PHYSICAL EXAM:  	General: seated in bed, NAD  	Neuro: AAOx3, no focal deficits  	HEENT: NCAT, MMM  	Neck: Soft and supple, No JVD  	Respiratory: CTA b/l, no w/r/r  	Cardiovascular: S1 and S2, RRR  	Gastrointestinal: +BS, soft, NTND  	Extremities: No c/c/e  	Vascular: 2+ peripheral pulses  	Musculoskeletal: 5/5 strength b/l UE and LE, sensation intact  Skin: No rashes        Assessment and Plan:   ·		  57y female w/     *n/v, cough-resolved  - n/v possibly due to diabetic gastroparesis/hx GERD/hiatal hernia  - compazine prn, IVF, diabetic diet   - PPI  - check rvp r/o flu - Negative  - cxr no infiltrate  Sx improved, tolerating diet, d/c ivf.  Discussed importance of med and diet compliance.    Cough chronic.     *asymptomatic bacteriuria  - no f/u/d, no fevers, no wbc- no indication for abx     *COPD  - stable  - continue home meds    *IDDM, hyperglycemia - uncontrolled  A1C- 10.8  - due to diet and med noncompliance   - diabetic diet, ISS, lantus    *CAD hx  - stable, continue home meds     Home today  Spent more than 30 minutes to prepare the discharge. 57y female w/ PMH COPD (no home O2), lung cancer, IDDM, CAD s/p CABG presents from [facility] shelter w/ nausea, vomiting and cough for 3 days.  Pt reports having multiple sick contacts at shelter, some possibly w/ flu.  Cough is nonproductive.  Nausea and vomiting  is intermittent and this happens occasionally w/ her diabetic gastroparesis and is relieved w/ compazine.  She also c/o intermittent lower abdominal discomfort.  Denies diarrhea. No urinary f/u/d.    1/21- feeling better today.  No abd pain, no n/v.  Ate omelette for breakfast.  Pt states she does not want to return to [facility] b/c they only feed her pasta and pizza.  +cough, chronic and nonproductive.   01/22/18: Patient seen and examined. No new complaints. No more cough, nausea, vomiting. Tolerating diet well.   01/23/18: Patient seen and examined. No new complaints. Waiting for shelter bed. Discussed with patient regarding d/c plan.       Vital Signs Last 24 Hrs  T(C): 36.5 (23 Jan 2018 11:41), Max: 37.2 (22 Jan 2018 21:00)  T(F): 97.7 (23 Jan 2018 11:41), Max: 98.9 (22 Jan 2018 21:00)  HR: 75 (23 Jan 2018 11:41) (60 - 75)  BP: 132/59 (23 Jan 2018 11:41) (124/58 - 151/60)  BP(mean): --  RR: 18 (23 Jan 2018 11:41) (18 - 18)  SpO2: 96% (23 Jan 2018 11:41) (92% - 96%)  RR: 18 (22 Jan 2018 11:54) (18 - 18)  SpO2: 95% (22 Jan 2018 11:54) (95% - 95%)    PHYSICAL EXAM:  	General: seated in bed, NAD  	Neuro: AAOx3, no focal deficits  	HEENT: NCAT, MMM  	Neck: Soft and supple, No JVD  	Respiratory: CTA b/l, no w/r/r  	Cardiovascular: S1 and S2, RRR  	Gastrointestinal: +BS, soft, NTND  	Extremities: No c/c/e  	Vascular: 2+ peripheral pulses  	Musculoskeletal: 5/5 strength b/l UE and LE, sensation intact  Skin: No rashes        Assessment and Plan:   ·		  57y female w/     *n/v, cough-resolved  - n/v possibly due to diabetic gastroparesis/hx GERD/hiatal hernia  - compazine prn, IVF, diabetic diet   - PPI  - check rvp r/o flu - Negative  - cxr no infiltrate  Sx improved, tolerating diet, d/c ivf.  Discussed importance of med and diet compliance.    Cough chronic.     *asymptomatic bacteriuria  - no f/u/d, no fevers, no wbc- no indication for abx     *COPD  - stable  - continue home meds    *IDDM, hyperglycemia - uncontrolled  A1C- 10.8  - due to diet and med noncompliance   - diabetic diet, ISS, lantus    *CAD hx  - stable, continue home meds     Home today  Spent more than 30 minutes to prepare the discharge.

## 2018-01-22 NOTE — DISCHARGE NOTE ADULT - MEDICATION SUMMARY - MEDICATIONS TO TAKE
I will START or STAY ON the medications listed below when I get home from the hospital:    aspirin 81 mg oral delayed release tablet  -- 1 tab(s) by mouth once a day  -- Indication: For CAD    insulin lispro 100 units/mL subcutaneous solution  -- 1 Unit(s) if Glucose 151 - 200  2 Unit(s) if Glucose 201 - 250  3 Unit(s) if Glucose 251 - 300  4 Unit(s) if Glucose 301 - 350  5 Unit(s) if Glucose 351 - 400  6 Unit(s) if Glucose GREATER THAN 400  -- Indication: For Hyperglycemia due to type 1 diabetes mellitus    Lantus 100 units/mL subcutaneous solution  -- 1 dose(s) subcutaneous 2 times a day  -- Indication: For Hyperglycemia due to type 1 diabetes mellitus    atorvastatin 40 mg oral tablet  -- 1 tab(s) by mouth once a day (at bedtime)  -- Indication: For CAD    Plavix 75 mg oral tablet  -- 1 tab(s) by mouth once a day  -- Indication: For CAD    QUEtiapine 50 mg oral tablet, extended release  -- 1 tab(s) by mouth once a day (in the evening)  -- Indication: For anxiety    metoprolol succinate 25 mg oral tablet, extended release  -- 1 tab(s) by mouth once a day  -- Indication: For CAD    Advair Diskus 250 mcg-50 mcg inhalation powder  -- 1 puff(s) inhaled 2 times a day   -- Check with your doctor before becoming pregnant.  For inhalation only.  Obtain medical advice before taking any non-prescription drugs as some may affect the action of this medication.  Rinse mouth thoroughly after use.    -- Indication: For COPD    albuterol 90 mcg/inh inhalation aerosol  -- 2 puff(s) inhaled every 6 hours, As needed, Shortness of Breath and/or Wheezing  -- Indication: For COPD    albuterol-ipratropium 2.5 mg-0.5 mg/3 mL inhalation solution  -- 3 milliliter(s) inhaled every 6 hours, As Needed wheezing/shortness of breath  -- Indication: For COPD    Singulair 10 mg oral tablet  -- 1 tab(s) by mouth once a day  -- Indication: For COPD    pantoprazole 40 mg oral delayed release tablet  -- 1 tab(s) by mouth once a day (before a meal)  -- Indication: For GERD    buPROPion 150 mg/12 hours (SR) oral tablet, extended release  -- 1 tab(s) by mouth 2 times a day  -- Indication: For anxiety    oxybutynin 5 mg oral tablet  -- 1 tab(s) by mouth 4 times a day  -- Indication: For urinary

## 2018-01-22 NOTE — DISCHARGE NOTE ADULT - CARE PLAN
Principal Discharge DX:	Chronic obstructive pulmonary disease, unspecified COPD type  Goal:	to prevent further admission  Assessment and plan of treatment:	Follow up with your PMD

## 2018-01-22 NOTE — DISCHARGE NOTE ADULT - CONDITIONS AT DISCHARGE
eat regularly; take insulin coverage as directed.   report to doctor any diabetic distress; follow up for routine care

## 2018-01-22 NOTE — DISCHARGE NOTE ADULT - MEDICATION SUMMARY - MEDICATIONS TO STOP TAKING
I will STOP taking the medications listed below when I get home from the hospital:    oxyCODONE 5 mg oral capsule  -- 1 cap(s) by mouth every 6 hours    predniSONE 10 mg oral tablet  -- 3 tab(s) by mouth once a day x 2 days  2 tab(s) by mouth once a day x 2 days  1 tab(s) by mouth once a day x 2 days    budesonide-formoterol 160 mcg-4.5 mcg/inh inhalation aerosol  -- 2 puff(s) inhaled 2 times a day

## 2018-01-23 VITALS
RESPIRATION RATE: 18 BRPM | OXYGEN SATURATION: 96 % | SYSTOLIC BLOOD PRESSURE: 132 MMHG | DIASTOLIC BLOOD PRESSURE: 59 MMHG | TEMPERATURE: 98 F | HEART RATE: 75 BPM

## 2018-01-23 LAB
GLUCOSE BLDC GLUCOMTR-MCNC: 213 MG/DL — HIGH (ref 70–99)
GLUCOSE BLDC GLUCOMTR-MCNC: 308 MG/DL — HIGH (ref 70–99)

## 2018-01-23 RX ADMIN — INSULIN GLARGINE 40 UNIT(S): 100 INJECTION, SOLUTION SUBCUTANEOUS at 08:23

## 2018-01-23 RX ADMIN — Medication 5 MILLIGRAM(S): at 05:50

## 2018-01-23 RX ADMIN — Medication 8: at 08:19

## 2018-01-23 RX ADMIN — Medication 25 MILLIGRAM(S): at 05:50

## 2018-01-23 RX ADMIN — Medication 81 MILLIGRAM(S): at 11:57

## 2018-01-23 RX ADMIN — Medication 5 MILLIGRAM(S): at 11:59

## 2018-01-23 RX ADMIN — CLOPIDOGREL BISULFATE 75 MILLIGRAM(S): 75 TABLET, FILM COATED ORAL at 11:58

## 2018-01-23 RX ADMIN — Medication 4: at 11:58

## 2018-01-23 RX ADMIN — PANTOPRAZOLE SODIUM 40 MILLIGRAM(S): 20 TABLET, DELAYED RELEASE ORAL at 05:50

## 2018-01-23 RX ADMIN — Medication 5 MILLIGRAM(S): at 08:26

## 2018-01-23 RX ADMIN — BUDESONIDE AND FORMOTEROL FUMARATE DIHYDRATE 2 PUFF(S): 160; 4.5 AEROSOL RESPIRATORY (INHALATION) at 08:04

## 2018-01-23 RX ADMIN — Medication 600 MILLIGRAM(S): at 05:50

## 2018-01-23 RX ADMIN — MONTELUKAST 10 MILLIGRAM(S): 4 TABLET, CHEWABLE ORAL at 11:58

## 2018-01-23 RX ADMIN — TIOTROPIUM BROMIDE 1 CAPSULE(S): 18 CAPSULE ORAL; RESPIRATORY (INHALATION) at 08:05

## 2018-01-29 DIAGNOSIS — C34.90 MALIGNANT NEOPLASM OF UNSPECIFIED PART OF UNSPECIFIED BRONCHUS OR LUNG: ICD-10-CM

## 2018-01-29 DIAGNOSIS — K44.9 DIAPHRAGMATIC HERNIA WITHOUT OBSTRUCTION OR GANGRENE: ICD-10-CM

## 2018-01-29 DIAGNOSIS — I25.10 ATHEROSCLEROTIC HEART DISEASE OF NATIVE CORONARY ARTERY WITHOUT ANGINA PECTORIS: ICD-10-CM

## 2018-01-29 DIAGNOSIS — E10.43 TYPE 1 DIABETES MELLITUS WITH DIABETIC AUTONOMIC (POLY)NEUROPATHY: ICD-10-CM

## 2018-01-29 DIAGNOSIS — K31.84 GASTROPARESIS: ICD-10-CM

## 2018-01-29 DIAGNOSIS — Z91.19 PATIENT'S NONCOMPLIANCE WITH OTHER MEDICAL TREATMENT AND REGIMEN: ICD-10-CM

## 2018-01-29 DIAGNOSIS — Z95.1 PRESENCE OF AORTOCORONARY BYPASS GRAFT: ICD-10-CM

## 2018-01-29 DIAGNOSIS — E10.65 TYPE 1 DIABETES MELLITUS WITH HYPERGLYCEMIA: ICD-10-CM

## 2018-01-29 DIAGNOSIS — K21.9 GASTRO-ESOPHAGEAL REFLUX DISEASE WITHOUT ESOPHAGITIS: ICD-10-CM

## 2018-01-29 DIAGNOSIS — J44.9 CHRONIC OBSTRUCTIVE PULMONARY DISEASE, UNSPECIFIED: ICD-10-CM

## 2019-01-11 NOTE — ED PROVIDER NOTE - PSYCHIATRIC, MLM
4 = completely dependent Alert and oriented to person, place, time/situation. normal mood and affect. no apparent risk to self or others.

## 2019-02-06 NOTE — DISCHARGE NOTE ADULT - REASON FOR ADMISSION
n/v, cough x 3 days details… Affect and characteristics of appearance, verbalizations, behaviors are appropriate

## 2019-03-28 NOTE — ED PROVIDER NOTE - GASTROINTESTINAL, MLM
Patient is a 60 yo with complaints of breast redness. It is a new problem. It has been going on for 3 weeks. An associated symptom is a feeling of fullness of the same breast. She denies any trauma to the area. She has a Hx of early stage breast cancer in the same breast and had lumpectomy and radiation    She denies fever, rash, chest pain, SOB, abdominal pain, nipple discharge, breast lump    Physical Exam:    A & 0 X 3, no acute apparent distress  Afebrile    Neck: no adenopathy, no thyroid nodules or enlargement    Breasts: symmetric with no skin dimpling, no nipple discharge. There are no palpable masses in either breast. When looking at the left breast there is a very light and diffuse redness above and below the nipple and no definite warmth appreciated.     No axillary adenopathy    Assessment:    Abdomen soft, non-tender, no guarding.

## 2019-08-23 NOTE — PATIENT PROFILE ADULT. - MEDICATION HERBAL REMEDIES, PROFILE
no
1 person assist/slightly unsteady however no LOB noted/nonverbal cues (demo/gestures)/verbal cues

## 2019-09-23 NOTE — PATIENT PROFILE ADULT. - STAGE
Stage I Cyclosporine Pregnancy And Lactation Text: This medication is Pregnancy Category C and it isn't know if it is safe during pregnancy. This medication is excreted in breast milk.
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
## 2020-08-14 NOTE — DIETITIAN INITIAL EVALUATION ADULT. - NUTRITION DIAGNOSTIC TERMINOLOGY #1
pt reported 2-3 episodes of diarrhea/day, diet controlled.  Pt endorsing constipation this morning, no bowel movement for about 1 week.  -started Pt on home med linaclotide, dosed for IBS (special order, to be placed by pharmacist)    vaginal itiching   -pt complaining of vaginal itching since being admitted, believes its a yeast infection as shes been treated for them in the past and is familiar with the symptoms.  -ordered 2% fluconazole cream (for 3 days) Functional

## 2020-09-17 NOTE — ED PROVIDER NOTE - CPE EDP PSYCH NORM
2nd attempt to reach patient, left message for her to return call to [phone].  
Diagnosis: Paroxysmal AFib.  Goal is 2.0 - 3.0     Spoke with patient via phone.   Last INR 9/10/20 was 2.6. Dose maintained per protocol.   Today's INR is 1.8 and is below goal range.    Current warfarin dosing verified with patient. Patient was informed that today's INR result is below therapeutic range and instructed to increase current dose per protocol. Discussed return date for next INR.   See Ambulatory Anticoagulation Flow Sheet.    Medication List Reviewed.      Dr. Cuadra is in the office today supervising the treatment.    Call your physician immediately if you notice any of the following symptoms of a blood clot:   · Sudden weakness in any limb  · Numbness or tingling anywhere  · Visual changes or loss of sight in either eye  · Sudden onset of slurred speech or inability to speak  · Dizziness or faintness  · New pain, swelling, redness or heat in any extremity  · New SOB or chest pain  Symptoms associated with blood clotting/low INR reviewed and verbalizes understanding.    Patient was instructed to contact the clinic with any unusual bleeding or bruising, any changes in medications, diet, health status, lifestyle, or any other changes, questions or concerns. Patient verbalized understanding of all discussed.       
External INR result from [facility], result of 1.8. Goal range should be 2.0-3.0.   
Externals [facility] left voice mail message for patient asking for return call to [facility] at  [phone].      
normal...

## 2021-05-29 NOTE — H&P ADULT - ENMT
"NCCU Brief Interval Note:    CTA shows diffused vasospasm and TCD continues to show elevated velocities in BL ACAs.  Will start patient on milrinone gtt at fix rate of 1 mcg/kg/min,   And titrate norepinephrine gtt to reach SBP goal of 160-200.    Discussed findings and plan with NCCU staff Dr. Starr.     Jose Murdock MD  Vascular Neurology Fellow  To page me or covering stroke neurology team member, click here: AMCOM   Choose \"On Call\" tab at top, then search dropdown box for \"Neurology Adult\", select location, press Enter, then look for stroke/neuro ICU/telestroke.      " detailed exam nose/pharynx/mouth

## 2021-06-30 NOTE — ED PROVIDER NOTE - MUSCULOSKELETAL, MLM
Informed pt. SARBJIT   No cva or flank tenderness, Spine appears normal, range of motion is not limited, no muscle or joint tenderness

## 2021-07-21 PROBLEM — C34.90 MALIGNANT NEOPLASM OF UNSPECIFIED PART OF UNSPECIFIED BRONCHUS OR LUNG: Chronic | Status: ACTIVE | Noted: 2017-06-20

## 2021-07-21 PROBLEM — J44.9 CHRONIC OBSTRUCTIVE PULMONARY DISEASE, UNSPECIFIED: Chronic | Status: ACTIVE | Noted: 2017-06-20

## 2021-07-22 PROBLEM — Z00.00 ENCOUNTER FOR PREVENTIVE HEALTH EXAMINATION: Status: ACTIVE | Noted: 2021-07-22

## 2021-08-20 NOTE — ED ADULT TRIAGE NOTE - PAIN RATING/NUMBER SCALE (0-10): REST
Chief Complaint   Patient presents with     Lab Only     IV placed, blood drawn.     Labs drawn via IV placed by Karlene Porter MA   in left arm.    Karlene Porter MA      
10

## 2021-12-24 NOTE — DISCHARGE NOTE ADULT - NS MD DC FALL RISK RISK
awaiting bed, no change
For information on Fall & Injury Prevention, visit [email or web address]
awaiting bed, no change
awaiting bed, no change

## 2022-01-04 ENCOUNTER — APPOINTMENT (OUTPATIENT)
Dept: ENDOCRINOLOGY | Facility: CLINIC | Age: 62
End: 2022-01-04

## 2022-05-19 NOTE — PHYSICAL THERAPY INITIAL EVALUATION ADULT - ORIENTATION, REHAB EVAL
5/19  aPTT = 79.2 sec at 0229. Will resume argatroban gtt. Will change to adjusted BW and decrease argatroban infusion to 0.25 mcg/kg/min (1.4 mL/hr). Recheck aPTT in 4 hours at 0730.   Merryl Epley, CamD  5/19/2022 3:10 AM oriented to person, place, time and situation

## 2022-07-13 NOTE — PATIENT PROFILE ADULT. - NSTOBACCO TYPE_GEN_A_CORE_RD
JENNIFER HOLT spoke with Pt for post partum follow up  Pt reported her and baby " Justin Jackson" are doing well  Pt denies post partum signs  Claimed her sister in law help her at home sometimes  Pt is eating and sleeping is fair  Pt is on Depo as contraception  Pt main concern was a rash on her breast and was transfer to schedule an appointment to address her needs  Pt receptive to close the case but will call JENNIFER HOLT as needed 
Cigarettes

## 2022-08-30 NOTE — PATIENT PROFILE ADULT. - NS PRO LAST MENSTRUAL DATE
unknown/years ago
[FreeTextEntry1] : \par - Continue Doxepin 1 ml QHS \par - Continue Melatonin 5 mg at bedtime\par - Continue Oxcarbazepine 150 mg (2.5 ml) in AM and 450 mg (7.5 ml) at bedtime  \par - Start Focalin Xr 5mg. SE profile discussed\par - Labs as ordered\par - Follow up in 4-6 weeks, call sooner for concerns of new medication

## 2022-10-16 NOTE — ED ADULT NURSE NOTE - RN DISCHARGE SIGNATURE

## 2022-11-28 NOTE — ED ADULT NURSE NOTE - CAS EDP DISCH DISPOSITION ADMI
on the discharge service for the patient. I have reviewed and made amendments to the documentation where necessary. Telemetry

## 2023-01-22 NOTE — H&P ADULT - NSHPPHYSICALEXAM_GEN_ALL_CORE
[FreeTextEntry1] : Benign essential HTN (401.1) (I10)\par CAD, multiple vessel (414.00) (I25.10)\par HLD (hyperlipidemia) (272.4) (E78.5)\par Preop examination (V72.84) (Z01.818)\par S/P CABG x 4 (V45.81) (Z95.1)
Vital Signs Last 24 Hrs  T(C): 36.9 (20 Jan 2018 12:41), Max: 36.9 (20 Jan 2018 12:41)  T(F): 98.4 (20 Jan 2018 12:41), Max: 98.4 (20 Jan 2018 12:41)  HR: 84 (20 Jan 2018 12:41) (84 - 95)  BP: 127/46 (20 Jan 2018 12:41) (107/66 - 150/90)  BP(mean): --  RR: 17 (20 Jan 2018 12:41) (17 - 18)  SpO2: 95% (20 Jan 2018 12:41) (93% - 100%)      PHYSICAL EXAM:  General: lying in bed, NAD  Neuro: AAOx3, no focal deficits  HEENT: NCAT, dry MM  Neck: Soft and supple, No JVD  Respiratory: CTA b/l, no w/r/r  Cardiovascular: S1 and S2, RRR  Gastrointestinal: +BS, soft, NTND  Extremities: No c/c/e  Vascular: 2+ peripheral pulses  Musculoskeletal: 5/5 strength b/l UE and LE, sensation intact  Skin: No rashes

## 2023-04-05 NOTE — DIETITIAN INITIAL EVALUATION ADULT. - EST PROTEIN NEEDS3
50.25 Manual Repair Warning Statement: We plan on removing the manually selected variable below in favor of our much easier automatic structured text blocks found in the previous tab. We decided to do this to help make the flow better and give you the full power of structured data. Manual selection is never going to be ideal in our platform and I would encourage you to avoid using manual selection from this point on, especially since I will be sunsetting this feature. It is important that you do one of two things with the customized text below. First, you can save all of the text in a word file so you can have it for future reference. Second, transfer the text to the appropriate area in the Library tab. Lastly, if there is a flap or graft type which we do not have you need to let us know right away so I can add it in before the variable is hidden. No need to panic, we plan to give you roughly 6 months to make the change.

## 2023-05-16 NOTE — PATIENT PROFILE ADULT. - PRO INTERPRETER NEED 2
English

## 2023-07-02 NOTE — DISCHARGE NOTE ADULT - MEDICATION SUMMARY - MEDICATIONS TO CHANGE
[facility] Note    # Demographics  Consult Type: Acute Stroke Level 1 (0-4.5 hrs)    Patient Location: Emergency Room    First Name: Jamshid    Last Name: Sylvester    YOB: 1947    Age: 76    Gender: Male    Facility: [facility]    Time of Initial Page (Central Time): 07/01/2023, 20:02    Time of Return Call (Central Time): 07/01/2023, 20:02      # HPI  History: 76M with blurry vision and mild headache. Takes eliquis baseline. Some speech trouble initially.      # PMH-FH-SH  Medications:  NOAC      # Data  Time Head CT personally read by me (Central Time): 07/01/2023, 20:28    Head CT:  no bleed  preliminarily reviewed by me, please refer to radiology read for official reading    CTA Head:  no large vessel occlusion  preliminarily reviewed by me, please refer to radiology read for official reading      # Assessment  Impression:  Transient Ischemic Attack  or complex migraine.      # Plan  Thrombolytic/Intervention: NOT IV Thrombolysis or IA Intervention candidate    Thrombolytic Exclusion (< 3 hour window):  actively on NOAC    Intraarterial Exclusion:  no large vessel occlusion (LVO)    Imaging: (urgency: STAT):  CT Angiogram Head and CT Angiogram Neck AND call back with results if abnormal    Other:  If patient has any neurological deterioration please call me back immediately      # Logistics  Telemedicine: Interactive 2 way audio and visual telecommunication technology was utilized during this visit      # Demographics  First Name: Jamshid    Last Name: Sylvester    Facility: [facility]     I will SWITCH the dose or number of times a day I take the medications listed below when I get home from the hospital:    metoclopramide 5 mg oral tablet  -- 1 tab(s) by mouth 4 times a day (before meals and at bedtime)

## 2024-01-18 NOTE — ED PROVIDER NOTE - GASTROINTESTINAL, MLM
----- Message from Dre Brooks MD sent at 1/18/2024  8:14 AM EST -----  Please let pt know that her swab showed BV.  We can send in flagyl or metrogel for her for this   Abdomen soft, non-tender, no guarding.

## 2024-03-13 NOTE — ED ADULT NURSE REASSESSMENT NOTE - NS ED NURSE REASSESS COMMENT FT1
Care of pt received from Carla Novoa RN, pt updated as to current plan of care and status, pt refusing to keep BiPAP on stating she began to cough and felt as though she could not breathe. MD Vogel at pt bedside updating pt as to results and plan  of care, pt began complaining of heartburn and medicated as per MD order. Pt given blankets and pillows for comfort, call bell within reach. Will continue to monitor.
Pt seen by hospitalist at this time, pt aware as to plan for admission. Awaiting admission orders at this time. Pt remains on bedside monitor with call bell within reach. Will continue to monitor.
Pt updated by MD Buck as to radiology results and plan for admission. Pt verbalized understanding of information. Pt given ice chips and call bell within reach, pt continues to have cough at this time. Pt remains on bedside monitor, will continue to monitor.
RVP collected and sent as per MD order, pt placed on isolation precautions with pending RVP. Pt aware as to plan of care, pt repositioned in stretcher for comfort. Pt aware as to plan for CT at this time. Will continue to monitor.
Not applicable

## 2024-05-20 NOTE — ED ADULT NURSE NOTE - CAS EDN DISCHARGE INTERVENTIONS
[FreeTextEntry1] :  Poornima will follow-up with us in 6 months. She will continue with ongoing dermatologic and ophthalmologic surveillance.  All medical entries were at my, Dr. Vahid Siddiqui, direction.  I have reviewed the chart and agree that the record accurately reflects my personal performance of the history, physical exam, assessment and plan.  Our office nurse practitioner was present for the duration of the office visit. IV discontinued, cath removed intact

## 2024-07-29 NOTE — ED ADULT NURSE NOTE - NS ED NOTE  TALK SOMEONE YN
[facility] faxed BLANK medical request form to [facility]. Scanned into media & placed into form bin.  
No

## 2024-08-21 NOTE — PROGRESS NOTE ADULT - ASSESSMENT
to restart Effexor   Hospital Course:   Upon admission, Douglas Armstrong was provided a safe secure environment, introduced to unit milieu. Patient participated in groups and individual therapies. Meds were adjusted as noted below. After few days of hospital care, patient began to feel improvement. Depression lifted, thoughts to harm self ceased.  Sleep improved, appetite was good. On morning rounds 8/21/2024, Douglas Armstrong endorses feeling ready for discharge. Patient denies suicidal or homicidal ideations, denies hallucinations or delusions. Denies SE's from meds.  It was decided that maximum benefit from hospital care had been achieved and patient can be discharged.     Consults:   None    Significant Diagnostic Studies: Routine labs and diagnostics    Treatments: Psychotropic medications, therapy with group, milieu, and 1:1 with nurses, social workers, PAOSEI/CNP, and Attending physician.      Discharge Medications:  Discharge Medication List as of 8/21/2024  7:10 AM        START taking these medications    Details   hydrOXYzine HCl (ATARAX) 50 MG tablet Take 1 tablet by mouth 3 times daily as needed for Anxiety, Disp-30 tablet, R-0Normal      venlafaxine (EFFEXOR XR) 75 MG extended release capsule Take 1 capsule by mouth nightly, Disp-30 capsule, R-0Normal           CONTINUE these medications which have CHANGED    Details   aspirin 81 MG EC tablet Take 1 tablet by mouth daily, Disp-30 tablet, R-0Normal      hydroCHLOROthiazide 12.5 MG capsule Take 1 capsule by mouth every morning, Disp-30 capsule, R-0Normal      lisinopril (PRINIVIL;ZESTRIL) 10 MG tablet Take 1 tablet by mouth daily, Disp-30 tablet, R-0Normal      omega-3 acid ethyl esters (LOVAZA) 1 g capsule Take 1 capsule by mouth 2 times daily, Disp-60 capsule, R-0Normal      omeprazole (PRILOSEC) 40 MG delayed release capsule Take 1 capsule by mouth Daily, Disp-30 capsule, R-0Normal      rosuvastatin (CRESTOR) 10 MG tablet Take 1 tablet by mouth daily,  Pt is a 57 y F with PMHx of  COPD who is an active smoker brought in [facility] from [facility]) with complaints of shortness of breath, pleuritic chest pain and dry non productive cough.

## 2024-12-02 NOTE — ED ADULT NURSE NOTE - NS ED NOTE ABUSE SUSPICION NEGLECT YN
Patient and family agree to readiness to go home.     Home health agency [facility] was called  notified of discharge. Spoke with (name)  Intake..   No

## 2025-01-23 NOTE — ED STATDOCS - NS_ATTENDINGSCRIBE_ED_ALL_ED
[de-identified] : reviewed the case and the imaging with the patient  compression fractures acute at T10, TT8, and T6 - chronic fractures at other levels  discussion of the condition and treatment options medications/rest/brace not helping  given the multitude of fractures and the loss of vertebral body height and the activity limiting pain and the acuity of the fractures noted on the MRi would indicate for kyphoplasty at these level  cautions discussed questions answered  TRAMADOL for pain - advised to get off the steroids as they impair healing - nsaids have not helped her  clearly has osteoporosis -  referral to endocinology for further treatment   i would rec kyphoplasty for this 10,6,8 disussion of this procedure - r/b/e of the procedure discussion and she is well informed and would like to proceed  given her 5x cardiac stents she will need clearance 
I personally performed the service described in the documentation recorded by the scribe in my presence, and it accurately and completely records my words and actions.